# Patient Record
Sex: FEMALE | Race: WHITE | NOT HISPANIC OR LATINO | Employment: OTHER | URBAN - METROPOLITAN AREA
[De-identification: names, ages, dates, MRNs, and addresses within clinical notes are randomized per-mention and may not be internally consistent; named-entity substitution may affect disease eponyms.]

---

## 2017-02-06 ENCOUNTER — OFFICE VISIT (OUTPATIENT)
Dept: INTERNAL MEDICINE | Facility: CLINIC | Age: 67
End: 2017-02-06

## 2017-02-06 VITALS
BODY MASS INDEX: 25.1 KG/M2 | WEIGHT: 147 LBS | DIASTOLIC BLOOD PRESSURE: 60 MMHG | SYSTOLIC BLOOD PRESSURE: 110 MMHG | HEIGHT: 64 IN

## 2017-02-06 DIAGNOSIS — Z12.31 VISIT FOR SCREENING MAMMOGRAM: ICD-10-CM

## 2017-02-06 DIAGNOSIS — Z00.00 HEALTH CARE MAINTENANCE: Primary | ICD-10-CM

## 2017-02-06 DIAGNOSIS — Z23 NEED FOR PNEUMOCOCCAL VACCINATION: ICD-10-CM

## 2017-02-06 DIAGNOSIS — F51.01 PRIMARY INSOMNIA: ICD-10-CM

## 2017-02-06 DIAGNOSIS — M72.2 PLANTAR FASCIITIS: ICD-10-CM

## 2017-02-06 DIAGNOSIS — I25.810 CORONARY ARTERY DISEASE INVOLVING AUTOLOGOUS ARTERY CORONARY BYPASS GRAFT WITHOUT ANGINA PECTORIS: ICD-10-CM

## 2017-02-06 DIAGNOSIS — R73.01 IMPAIRED FASTING GLUCOSE: ICD-10-CM

## 2017-02-06 DIAGNOSIS — R91.1 LUNG NODULE SEEN ON IMAGING STUDY: ICD-10-CM

## 2017-02-06 DIAGNOSIS — I10 ESSENTIAL HYPERTENSION: ICD-10-CM

## 2017-02-06 DIAGNOSIS — E78.5 HYPERLIPIDEMIA, UNSPECIFIED HYPERLIPIDEMIA TYPE: ICD-10-CM

## 2017-02-06 DIAGNOSIS — I10 BENIGN ESSENTIAL HYPERTENSION: ICD-10-CM

## 2017-02-06 PROCEDURE — G0009 ADMIN PNEUMOCOCCAL VACCINE: HCPCS | Performed by: INTERNAL MEDICINE

## 2017-02-06 PROCEDURE — G0402 INITIAL PREVENTIVE EXAM: HCPCS | Performed by: INTERNAL MEDICINE

## 2017-02-06 PROCEDURE — 99214 OFFICE O/P EST MOD 30 MIN: CPT | Performed by: INTERNAL MEDICINE

## 2017-02-06 PROCEDURE — 90670 PCV13 VACCINE IM: CPT | Performed by: INTERNAL MEDICINE

## 2017-02-06 PROCEDURE — G0101 CA SCREEN;PELVIC/BREAST EXAM: HCPCS | Performed by: INTERNAL MEDICINE

## 2017-02-06 RX ORDER — TRAZODONE HYDROCHLORIDE 100 MG/1
100 TABLET ORAL NIGHTLY
Qty: 90 TABLET | Refills: 3 | Status: SHIPPED | OUTPATIENT
Start: 2017-02-06 | End: 2017-08-07 | Stop reason: SDUPTHER

## 2017-02-06 RX ORDER — LISINOPRIL 2.5 MG/1
2.5 TABLET ORAL DAILY
Qty: 90 TABLET | Refills: 3 | Status: SHIPPED | OUTPATIENT
Start: 2017-02-06 | End: 2018-02-09 | Stop reason: SDUPTHER

## 2017-02-06 RX ORDER — SACCHAROMYCES BOULARDII 250 MG
250 CAPSULE ORAL 2 TIMES DAILY
COMMUNITY
End: 2017-04-25

## 2017-02-06 RX ORDER — RANOLAZINE 500 MG/1
500 TABLET, EXTENDED RELEASE ORAL 2 TIMES DAILY
COMMUNITY
End: 2018-02-19

## 2017-02-06 RX ORDER — ISOSORBIDE MONONITRATE 30 MG/1
30 TABLET, EXTENDED RELEASE ORAL DAILY
COMMUNITY
End: 2017-08-07 | Stop reason: SDUPTHER

## 2017-02-06 NOTE — PATIENT INSTRUCTIONS
"Annual wellness visit with preventive exam as well as age and risk appropriate councelling completed.    Cardiovascular disease councelling: current.  Diabetes screening and councelling: current.  Breast cancer screening: is due. Will schedule..  Osteoporosis screening: up to date. Recommended frequency every 2 years. Next DEXA is due in 2018..  Glaucoma screening: up to date.   AAA screening: not needed..  Hep C screening (born between [birth year]73) completed..  Colorectal cancer screening: up to date. Recommended every 5 years. Next screening is recommended in 2020..    Immunizations:   1. Influenza vaccine discussed and recommended, but patient declines.   2. Pneumococcal vaccines: Prevnar 13 will be given today, pneumovaccine 23 will be administered in a year.   3. Zostavax: completed.      Advanced directives planning: has Living Will. Discussed. I agree with patient wishes and decision..    Medications reviewed and medication list updated.   Potential harmful drug-disease interactions in the elderly:statin and impaired fasting blood sugar.  High risk medication in elderly: none  ASA use: continue daily ASA 81 mg.    Hyperlipidemia - well controlled with current  medication and low fat diet. Target LDL is  < 70 mg/dl (\"very high\" risk for CHD). Patient's 72. Normal liver function tests. Continue same medication and diet.Regular exercise recommended.  Impaired fasting blood sugar - normal fasting blood sugar on the recent tests, but her Hb A1C is elevated at 6.2. I had explained to the patient this this means that her risk of diabetes is increased and she has to be on low carb, low starches and sweets diet. Cotninue regular exercise, try to increase exercise to 6 days a week.   Reported lung nodule on the PET scan - will request report and decide if repeat is needed. No lung cancer risk factors, no h/o tobacco.  Plantar fasciitis - given exercise description, needs comfortable footwear. Use ice. If not better - " will refer for dry needling.

## 2017-02-06 NOTE — PROGRESS NOTES
"Onelia Lovell is a 66 y.o. female. Here for Welcome to Medicare visit.    History of Present Illness   Visit Vitals   • /60   • Ht 64\" (162.6 cm)   • Wt 147 lb (66.7 kg)   • BMI 25.23 kg/m2     Patient is being seen for subsequent wellness visit.  Hospitalizations in a past: 3  Once per lifetime Medicare screening tests: ECG - under the care of cardiologist    AAA risk assessment: 1. FH: none. 2.H/o tobacco smoking: none. 3. CV or PAD: as per medical problems list.    Osteoporosis risk assessment:   1. Personal h/o fracture: none  2. FH of fracture/osteoporosis : osteoporosis in mother, h/o hip fracture in maternal aunt   3. Tobacco smoking : none.   4. Alcohol use: none  5.  Secondary osteoporosis: none   6.  Age above 50   7. Hormonal deficiency: yes.    HIV risk assessment: 1. Risky sexual practices: none, 2. H/o blood transfusion: none.    Tobacco use: none   Alcohol use: none  Illicit drugs use: none  Diet: healthy heart  Exercise: 3-4 days a week, walking and resistance training    Anxiety screening: How many days in the last 2 weeks you were  1. Feeling anxious, nervous, on edge: none  2. Unable to stop worrying: none  3. Worrying too much about different things: none  4. Having problems relaxing:none  5. Feeling restless or unable to sit still:none  6. Feeling irritable or easely annoyed: none  7. Being afraid that something awful might happen: none    Depression screening PHQ9:  Over the past 2 weeks how often have you been bothered by  1. Lack of interest or pleasuer in usual activities: none  2. Feeling down, depressed, hopeless:none  3. Troubles falling asleep/sleeping too long:none  4. Feeling tired, having little energy: none  5. Poor appetite or overeating: none  6. Feeling bad about yourself:none.  7. Trouble concentrating: at the baseline.  8. Moving or speaking too slow or much faster than usual: none  9. Thoughts about harming yourself:none.    Cognitive impairment " "screening:   Do you have difficulties with:  1. Language: no  2. Behavior: no  3. Learning/retaining new information: no  4. Handling complex tasks: no  5. Reasoning: no  6. Spacial ability and orientation: no    Please refer to attached Fall Risk Assessement , Mini Cog, clock and Urinary incont  screening forms.      Hearing: normal.  Driving: unrestricted  ADL: independent  ADL details: Does patient needs help with:  telephone use: no  Transportation: no  Housework: no  Shopping: no  meal preparation: no  laundry: no  managing medication:no  Participate in the social activities: Y    Fall risk factors:   1.Use of alcohol: no    2.Polypharmacy: yes  3.H/o of previous fall:  no  4. Mobility impairment:  no  5. Visual impairment:  no  6. Deconditioning: no  7. Use of antihypertensive medication:  yes  8. Use of sedatives: no  9. Use of antidepressant: yes    Home safety:   1.loose rugs: no   2.unfamiliar environment: no   3. Uneven floors: no   4. No grab bars in the bathroom:  no  5. No banister on stairs: no      Advanced directives: has Living Will. Discussed. I agree with patient wishes and decision..    Co-managers: ophthalmology  [facility], cardiologist Dr.janet Ocampo, dermatologist , GI , also has allergist [facility] but doesn't remember the name of MD.      Visit Vitals   • /60   • Ht 64\" (162.6 cm)   • Wt 147 lb (66.7 kg)   • BMI 25.23 kg/m2       Current Outpatient Prescriptions:   •  aspirin 81 MG EC tablet, Take  by mouth., Disp: , Rfl:   •  Calcium 250 MG capsule, Take  by mouth., Disp: , Rfl:   •  Cetirizine HCl 10 MG capsule, Take  by mouth., Disp: , Rfl:   •  Cholecalciferol (VITAMIN D3) 5000 UNITS capsule capsule, Take 1,000 Units by mouth daily., Disp: , Rfl:   •  Cranberry (HM CRANBERRY SUPER STRENGTH) 300 MG tablet, Take 300 mg by mouth Daily., Disp: , Rfl:   •  CRESTOR 10 MG tablet, TAKE 1 TABLET DAILY, Disp: 90 tablet, Rfl: 3  •  fluticasone " "(FLONASE) 50 MCG/ACT nasal spray, into each nostril., Disp: , Rfl:   •  isosorbide mononitrate (IMDUR) 30 MG 24 hr tablet, Take 30 mg by mouth Daily., Disp: , Rfl:   •  lisinopril (PRINIVIL,ZESTRIL) 2.5 MG tablet, Take 1 tablet by mouth Daily., Disp: 90 tablet, Rfl: 3  •  Magnesium Oxide 400 MG capsule, Take  by mouth., Disp: , Rfl:   •  metoprolol tartrate (LOPRESSOR) 25 MG tablet, Take 1 tablet by mouth Daily., Disp: 90 tablet, Rfl: 3  •  Multiple Vitamin (MULTI VITAMIN DAILY PO), Take  by mouth., Disp: , Rfl:   •  Omega-3 Fatty Acids (FISH OIL) 1200 MG capsule capsule, Take  by mouth., Disp: , Rfl:   •  ranitidine (ZANTAC) 150 MG capsule, Take 1 capsule by mouth 2 (two) times a day., Disp: 60 capsule, Rfl: 11  •  ranolazine (RANEXA) 500 MG 12 hr tablet, Take 500 mg by mouth 2 (Two) Times a Day., Disp: , Rfl:   •  saccharomyces boulardii (FLORASTOR) 250 MG capsule, Take 250 mg by mouth 2 (Two) Times a Day., Disp: , Rfl:   •  traZODone (DESYREL) 100 MG tablet, Take 1 tablet by mouth every night., Disp: 90 tablet, Rfl: 3  •  valACYclovir (VALTREX) 1000 MG tablet, Take  by mouth every 12 (twelve) hours., Disp: , Rfl:     Patient has been diagnosed with hyperlipidemia. Target LDL is < 70 mg/dl (\"very high\" risk for CHD). Patient is on low fat diet with good compliance. Patient is compliant with treatment: daily use of Crestor (rozuvastatin). Side effects of medication: none. Exercise 3-4 days a week, walking and aerobic exercise.    Patient had been noticed to have elevated fasting blood sugars. No FH of DM.  Compliance with low carb diabetic diet is poor. Regular exercise: walks and does resistance training 3-4 days a week.    Patient reports that she had PET scan ordered by her cardiologist Dr.janet Ocampo 6 weeks ago or so. Report is not available ti me at this time. Patient has some SOA while climbs stairs, no orthopnea, no cough or hemoptysis. There is no h/o tobacco smoking. Ne fever/chills.    Patient walks " regularly, but about a year ago the walking had been difficult due to the pain in the lateral right hill. No treatment so afar. No h/o trauma.                                  The following portions of the patient's history were reviewed and updated as appropriate: allergies, current medications, past family history, past medical history, past social history, past surgical history and problem list.    Review of Systems   Constitutional: Negative for chills and fever.   HENT: Positive for postnasal drip. Negative for sinus pressure and sore throat.    Eyes: Negative for pain and itching.   Respiratory: Negative for cough and chest tightness.    Cardiovascular: Negative for chest pain and leg swelling.   Gastrointestinal: Negative for abdominal pain and blood in stool.   Endocrine: Negative for cold intolerance and heat intolerance.   Genitourinary: Negative for difficulty urinating and flank pain.   Musculoskeletal: Negative for back pain and neck pain.   Skin: Negative for color change and rash.   Neurological: Negative for dizziness, weakness and headaches.   Hematological: Negative for adenopathy. Does not bruise/bleed easily.   Psychiatric/Behavioral: Negative for sleep disturbance. The patient is not nervous/anxious.        Objective   Physical Exam   Constitutional: She is oriented to person, place, and time. She appears well-developed. No distress.   HENT:   Head: Normocephalic and atraumatic.   Right Ear: Tympanic membrane, external ear and ear canal normal. No tenderness. No mastoid tenderness. Tympanic membrane is not injected. No middle ear effusion.   Left Ear: Tympanic membrane, external ear and ear canal normal. No tenderness. No mastoid tenderness. Tympanic membrane is not injected.  No middle ear effusion.   Nose: Nose normal. Right sinus exhibits no maxillary sinus tenderness and no frontal sinus tenderness. Left sinus exhibits no maxillary sinus tenderness and no frontal sinus tenderness.    Mouth/Throat: Uvula is midline, oropharynx is clear and moist and mucous membranes are normal. No oropharyngeal exudate or posterior oropharyngeal edema.   Eyes: Conjunctivae, EOM and lids are normal. Pupils are equal, round, and reactive to light. Right eye exhibits no discharge. Left eye exhibits no discharge. Right conjunctiva is not injected. Right conjunctiva has no hemorrhage. Left conjunctiva is not injected. Left conjunctiva has no hemorrhage. No scleral icterus. Right eye exhibits normal extraocular motion and no nystagmus. Left eye exhibits normal extraocular motion and no nystagmus.   Neck: Normal range of motion and full passive range of motion without pain. Neck supple. No JVD present. Carotid bruit is not present. No thyromegaly present.   Cardiovascular: Normal rate, regular rhythm, S1 normal, S2 normal, normal heart sounds and intact distal pulses.  Exam reveals no gallop and no friction rub.    No murmur heard.  Pulmonary/Chest: Effort normal and breath sounds normal. No accessory muscle usage. No respiratory distress. She has no decreased breath sounds. She has no wheezes. She has no rhonchi. She has no rales. Right breast exhibits no inverted nipple, no mass, no nipple discharge, no skin change and no tenderness. Left breast exhibits no inverted nipple, no mass, no nipple discharge, no skin change and no tenderness. Breasts are symmetrical. There is no breast swelling.   S/p bilat implants, well healed scars   Abdominal: Soft. Bowel sounds are normal. She exhibits no distension, no abdominal bruit and no mass. There is no tenderness. There is no rebound and no guarding. Hernia confirmed negative in the right inguinal area and confirmed negative in the left inguinal area.   Genitourinary: Rectum normal, vagina normal and uterus normal. Rectal exam shows no fissure, no mass and anal tone normal. No breast tenderness, discharge or bleeding. Pelvic exam was performed with patient supine. No labial  fusion. There is no rash, tenderness, lesion or injury on the right labia. There is no rash, tenderness, lesion or injury on the left labia. Uterus is not deviated, not enlarged, not fixed and not tender. Cervix exhibits no motion tenderness, no discharge and no friability. Right adnexum displays no mass, no tenderness and no fullness. Left adnexum displays no mass, no tenderness and no fullness. No bleeding in the vagina. No vaginal discharge found.   Genitourinary Comments: PAP taken   Musculoskeletal: She exhibits tenderness (on palpation bilaterally right hill). She exhibits no edema.   Lymphadenopathy:        Head (right side): No submental, no submandibular, no preauricular, no posterior auricular and no occipital adenopathy present.        Head (left side): No submental, no submandibular, no preauricular, no posterior auricular and no occipital adenopathy present.     She has no cervical adenopathy.        Right cervical: No superficial cervical, no deep cervical and no posterior cervical adenopathy present.       Left cervical: No superficial cervical, no deep cervical and no posterior cervical adenopathy present.     She has no axillary adenopathy.        Right: No inguinal and no supraclavicular adenopathy present.        Left: No inguinal and no supraclavicular adenopathy present.   Neurological: She is alert and oriented to person, place, and time. She has normal reflexes. She displays normal reflexes. No cranial nerve deficit or sensory deficit. She exhibits normal muscle tone. Coordination normal.   Reflex Scores:       Bicep reflexes are 2+ on the right side and 2+ on the left side.       Patellar reflexes are 2+ on the right side and 2+ on the left side.  Skin: Skin is warm. She is not diaphoretic. No cyanosis. Nails show no clubbing.   Psychiatric: She has a normal mood and affect. Her behavior is normal.   Vitals reviewed.      Assessment/Plan   Diagnoses and all orders for this visit:    Health  care maintenance  -     Pap IG, Rfx HPV ASCU; Future    Hyperlipidemia, unspecified hyperlipidemia type  -     Comprehensive Metabolic Panel; Future  -     Lipid Panel; Future    Impaired fasting glucose  -     Hemoglobin A1c; Future    Visit for screening mammogram  -     Mammo Screening Bilateral With CAD    Lung nodule seen on imaging study    Need for pneumococcal vaccination  -     Pneumococcal Conjugate Vaccine 13-Valent All    Coronary artery disease involving autologous artery coronary bypass graft without angina pectoris  -     metoprolol tartrate (LOPRESSOR) 25 MG tablet; Take 1 tablet by mouth Daily.    Primary insomnia  -     traZODone (DESYREL) 100 MG tablet; Take 1 tablet by mouth Every Night.    Benign essential hypertension  -     lisinopril (PRINIVIL,ZESTRIL) 2.5 MG tablet; Take 1 tablet by mouth Daily.    Essential hypertension    Plantar fasciitis    Annual wellness visit with preventive exam as well as age and risk appropriate councelling completed.    Cardiovascular disease councelling: current.  Diabetes screening and councelling: current.  Breast cancer screening: is due. Will schedule..  Osteoporosis screening: up to date. Recommended frequency every 2 years. Next DEXA is due in 2018..  Glaucoma screening: up to date.   AAA screening: not needed..  Hep C screening (born between [birth year]) completed..  Colorectal cancer screening: up to date. Recommended every 5 years. Next screening is recommended in 2020..    Immunizations:   1. Influenza vaccine discussed and recommended, but patient declines.   2. Pneumococcal vaccines: Prevnar 13 will be given today, pneumovaccine 23 will be administered in a year.   3. Zostavax: completed.      Advanced directives planning: has Living Will. Discussed. I agree with patient wishes and decision..    Medications reviewed and medication list updated.   Potential harmful drug-disease interactions in the elderly:statin and impaired fasting blood sugar.  High  "risk medication in elderly: none  ASA use: continue daily ASA 81 mg.    Hyperlipidemia - well controlled with current  medication and low fat diet. Target LDL is  < 70 mg/dl (\"very high\" risk for CHD). Patient's 72. Normal liver function tests. Continue same medication and diet.Regular exercise recommended.  Impaired fasting blood sugar - normal fasting blood sugar on the recent tests, but her Hb A1C is elevated at 6.2. I had explained to the patient this this means that her risk of diabetes is increased and she has to be on low carb, low starches and sweets diet. Cotninue regular exercise, try to increase exercise to 6 days a week.   Reported lung nodule on the PET scan - will request report and decide if repeat is needed. No lung cancer risk factors, no h/o tobacco.  Plantar fasciitis - given exercise description, needs comfortable footwear. Use ice. If not better - will refer for dry needling.       "

## 2017-02-08 LAB
CHROM ANALY OVERALL INTERP-IMP: NORMAL
CONV .: NORMAL
CONV .: NORMAL
CONV PERFORMED BY:: NORMAL
DX ICD CODE: NORMAL
HIV 1 & 2 AB SER-IMP: NORMAL
REF LAB TEST METHOD: NORMAL
STAT OF ADQ CVX/VAG CYTO-IMP: NORMAL

## 2017-02-14 ENCOUNTER — APPOINTMENT (OUTPATIENT)
Dept: WOMENS IMAGING | Facility: HOSPITAL | Age: 67
End: 2017-02-14

## 2017-02-14 PROCEDURE — 77063 BREAST TOMOSYNTHESIS BI: CPT | Performed by: RADIOLOGY

## 2017-02-14 PROCEDURE — G0202 SCR MAMMO BI INCL CAD: HCPCS | Performed by: RADIOLOGY

## 2017-02-17 DIAGNOSIS — I25.810 CORONARY ARTERY DISEASE INVOLVING AUTOLOGOUS ARTERY CORONARY BYPASS GRAFT WITHOUT ANGINA PECTORIS: ICD-10-CM

## 2017-04-25 ENCOUNTER — OFFICE VISIT (OUTPATIENT)
Dept: INTERNAL MEDICINE | Facility: CLINIC | Age: 67
End: 2017-04-25

## 2017-04-25 VITALS
SYSTOLIC BLOOD PRESSURE: 110 MMHG | DIASTOLIC BLOOD PRESSURE: 72 MMHG | OXYGEN SATURATION: 96 % | HEIGHT: 64 IN | BODY MASS INDEX: 25.1 KG/M2 | WEIGHT: 147 LBS | HEART RATE: 61 BPM

## 2017-04-25 DIAGNOSIS — H10.89 OTHER CONJUNCTIVITIS OF LEFT EYE: Primary | ICD-10-CM

## 2017-04-25 DIAGNOSIS — R60.0 FACIAL EDEMA: ICD-10-CM

## 2017-04-25 DIAGNOSIS — H10.89 OTHER CONJUNCTIVITIS OF LEFT EYE: ICD-10-CM

## 2017-04-25 PROCEDURE — 99213 OFFICE O/P EST LOW 20 MIN: CPT | Performed by: INTERNAL MEDICINE

## 2017-04-25 RX ORDER — TOBRAMYCIN AND DEXAMETHASONE 3; 1 MG/ML; MG/ML
1 SUSPENSION/ DROPS OPHTHALMIC
Qty: 10 ML | Refills: 0 | Status: SHIPPED | OUTPATIENT
Start: 2017-04-25 | End: 2017-08-07

## 2017-04-25 RX ORDER — METHYLPREDNISOLONE 4 MG/1
TABLET ORAL
Qty: 21 TABLET | Refills: 0 | Status: SHIPPED | OUTPATIENT
Start: 2017-04-25 | End: 2017-08-07

## 2017-04-25 NOTE — PROGRESS NOTES
Subjective   Colleen Lovell is a 66 y.o. female.     Facial Swelling   This is a new problem. The current episode started in the past 7 days.        The following portions of the patient's history were reviewed and updated as appropriate: allergies, current medications, past family history, past medical history, past social history, past surgical history and problem list.    Review of Systems   Constitutional:        Has new problems   HENT: Positive for facial swelling (L side of face has been swelling past 2 days Doing about the samne Gets better as day goes on but swell up more @ night  No pain or itching) and rhinorrhea (Usual allergies).    Eyes: Positive for redness (Began W / eye infection last Thursday  & used lft over drops Hasn't helped ).   Respiratory: Negative.    Cardiovascular: Negative.        Objective   Physical Exam   HENT:   Head: Normocephalic.   Right Ear: External ear normal.   Left Ear: External ear normal.   Mouth/Throat: Oropharynx is clear and moist.   Has mild L facial swelling& erythema Nontender   Eyes: EOM are normal. Pupils are equal, round, and reactive to light.   L conjunctiva mildly congested   Cardiovascular: Normal rate, regular rhythm and normal heart sounds.    Pulmonary/Chest: Effort normal and breath sounds normal.   Musculoskeletal: Normal range of motion.   Neurological: She is alert.   Vitals reviewed.      Assessment/Plan   Colleen was seen today for facial swelling.    Diagnoses and all orders for this visit:    Other conjunctivitis of left eye  -     tobramycin-dexamethasone (TOBRADEX) 0.3-0.1 % ophthalmic suspension; Administer 1 drop into the left eye Every 4 (Four) Hours While Awake.    Facial edema  -     MethylPREDNISolone (MEDROL) 4 MG tablet; follow package directions

## 2017-05-02 ENCOUNTER — PATIENT MESSAGE (OUTPATIENT)
Dept: INTERNAL MEDICINE | Facility: CLINIC | Age: 67
End: 2017-05-02

## 2017-07-31 ENCOUNTER — LAB (OUTPATIENT)
Dept: INTERNAL MEDICINE | Facility: CLINIC | Age: 67
End: 2017-07-31

## 2017-07-31 DIAGNOSIS — R73.01 IMPAIRED FASTING GLUCOSE: ICD-10-CM

## 2017-07-31 DIAGNOSIS — E78.5 HYPERLIPIDEMIA, UNSPECIFIED HYPERLIPIDEMIA TYPE: ICD-10-CM

## 2017-07-31 LAB
ALBUMIN SERPL-MCNC: 4.07 G/DL (ref 3.4–4.6)
ALBUMIN/GLOB SERPL: 1.3 G/DL
ALP SERPL-CCNC: 93 U/L (ref 46–116)
ALT SERPL W P-5'-P-CCNC: 28 U/L (ref 14–59)
ANION GAP SERPL CALCULATED.3IONS-SCNC: 9 MMOL/L
AST SERPL-CCNC: 24 U/L (ref 7–37)
BILIRUB SERPL-MCNC: 1.1 MG/DL (ref 0.2–1)
BUN BLD-MCNC: 15 MG/DL (ref 6–22)
BUN/CREAT SERPL: 18.5 (ref 7–25)
CALCIUM SPEC-SCNC: 9.4 MG/DL (ref 8.6–10.5)
CHLORIDE SERPL-SCNC: 106 MMOL/L (ref 95–107)
CHOLEST SERPL-MCNC: 170 MG/DL (ref 0–200)
CO2 SERPL-SCNC: 28 MMOL/L (ref 23–32)
CREAT BLD-MCNC: 0.81 MG/DL (ref 0.55–1.02)
GFR SERPL CREATININE-BSD FRML MDRD: 71 ML/MIN/1.73
GLOBULIN UR ELPH-MCNC: 3.2 GM/DL
GLUCOSE BLD-MCNC: 100 MG/DL (ref 70–100)
HBA1C MFR BLD: 5.53 % (ref 4.8–5.6)
HDLC SERPL-MCNC: 57 MG/DL (ref 40–81)
LDLC SERPL CALC-MCNC: 91 MG/DL (ref 0–100)
LDLC/HDLC SERPL: 1.59 {RATIO}
POTASSIUM BLD-SCNC: 5.2 MMOL/L (ref 3.3–5.3)
PROT SERPL-MCNC: 7.3 G/DL (ref 6.3–8.4)
SODIUM BLD-SCNC: 143 MMOL/L (ref 136–145)
TRIGL SERPL-MCNC: 112 MG/DL (ref 0–150)
VLDLC SERPL-MCNC: 22.4 MG/DL

## 2017-07-31 PROCEDURE — 80061 LIPID PANEL: CPT | Performed by: INTERNAL MEDICINE

## 2017-07-31 PROCEDURE — 80053 COMPREHEN METABOLIC PANEL: CPT | Performed by: INTERNAL MEDICINE

## 2017-08-07 ENCOUNTER — OFFICE VISIT (OUTPATIENT)
Dept: INTERNAL MEDICINE | Facility: CLINIC | Age: 67
End: 2017-08-07

## 2017-08-07 VITALS
WEIGHT: 147 LBS | DIASTOLIC BLOOD PRESSURE: 62 MMHG | RESPIRATION RATE: 14 BRPM | SYSTOLIC BLOOD PRESSURE: 104 MMHG | BODY MASS INDEX: 25.1 KG/M2 | HEIGHT: 64 IN

## 2017-08-07 DIAGNOSIS — F51.01 PRIMARY INSOMNIA: ICD-10-CM

## 2017-08-07 DIAGNOSIS — Z00.00 HEALTH CARE MAINTENANCE: ICD-10-CM

## 2017-08-07 DIAGNOSIS — I25.810 CORONARY ARTERY DISEASE INVOLVING AUTOLOGOUS ARTERY CORONARY BYPASS GRAFT WITHOUT ANGINA PECTORIS: ICD-10-CM

## 2017-08-07 DIAGNOSIS — E78.00 PURE HYPERCHOLESTEROLEMIA: Primary | ICD-10-CM

## 2017-08-07 DIAGNOSIS — R73.01 IMPAIRED FASTING GLUCOSE: ICD-10-CM

## 2017-08-07 PROBLEM — I51.89 DIASTOLIC DYSFUNCTION: Status: ACTIVE | Noted: 2017-08-07

## 2017-08-07 PROCEDURE — 99213 OFFICE O/P EST LOW 20 MIN: CPT | Performed by: INTERNAL MEDICINE

## 2017-08-07 RX ORDER — TRAZODONE HYDROCHLORIDE 100 MG/1
100 TABLET ORAL NIGHTLY
Qty: 90 TABLET | Refills: 3 | Status: SHIPPED | OUTPATIENT
Start: 2017-08-07 | End: 2018-02-09 | Stop reason: SDUPTHER

## 2017-08-07 RX ORDER — ROSUVASTATIN CALCIUM 10 MG/1
10 TABLET, COATED ORAL DAILY
Qty: 90 TABLET | Refills: 3 | Status: SHIPPED | OUTPATIENT
Start: 2017-08-07 | End: 2018-02-09 | Stop reason: SDUPTHER

## 2017-08-07 RX ORDER — ISOSORBIDE MONONITRATE 30 MG/1
30 TABLET, EXTENDED RELEASE ORAL DAILY
Qty: 90 TABLET | Refills: 3 | Status: SHIPPED | OUTPATIENT
Start: 2017-08-07 | End: 2018-02-19 | Stop reason: SDUPTHER

## 2017-08-07 NOTE — PROGRESS NOTES
"Onelia Lovell is a 66 y.o. female. Here for hyperlipidemaia and impaired fasting blood sugar f/u    History of Present Illness     /62 (BP Location: Left arm, Patient Position: Sitting, Cuff Size: Adult)  Resp 14  Ht 64\" (162.6 cm)  Wt 147 lb (66.7 kg)  BMI 25.23 kg/m2    Current Outpatient Prescriptions:   •  aspirin 81 MG EC tablet, Take  by mouth., Disp: , Rfl:   •  Calcium 250 MG capsule, Take  by mouth., Disp: , Rfl:   •  Cetirizine HCl 10 MG capsule, Take  by mouth., Disp: , Rfl:   •  Cholecalciferol (VITAMIN D3) 5000 UNITS capsule capsule, Take 1,000 Units by mouth daily., Disp: , Rfl:   •  Cranberry (HM CRANBERRY SUPER STRENGTH) 300 MG tablet, Take 300 mg by mouth Daily., Disp: , Rfl:   •  CRESTOR 10 MG tablet, TAKE 1 TABLET DAILY, Disp: 90 tablet, Rfl: 3  •  isosorbide mononitrate (IMDUR) 30 MG 24 hr tablet, Take 30 mg by mouth Daily., Disp: , Rfl:   •  lisinopril (PRINIVIL,ZESTRIL) 2.5 MG tablet, Take 1 tablet by mouth Daily., Disp: 90 tablet, Rfl: 3  •  Magnesium Oxide 400 MG capsule, Take  by mouth., Disp: , Rfl:   •  MethylPREDNISolone (MEDROL) 4 MG tablet, follow package directions, Disp: 21 tablet, Rfl: 0  •  metoprolol tartrate (LOPRESSOR) 25 MG tablet, Take 1 tablet by mouth 2 (Two) Times a Day. Patient's dose has been increased, Disp: 180 tablet, Rfl: 3  •  Multiple Vitamin (MULTI VITAMIN DAILY PO), Take  by mouth., Disp: , Rfl:   •  Omega-3 Fatty Acids (FISH OIL) 1200 MG capsule capsule, Take  by mouth., Disp: , Rfl:   •  ranitidine (ZANTAC) 150 MG capsule, Take 1 capsule by mouth 2 (two) times a day., Disp: 60 capsule, Rfl: 11  •  ranolazine (RANEXA) 500 MG 12 hr tablet, Take 500 mg by mouth 2 (Two) Times a Day., Disp: , Rfl:   •  tobramycin-dexamethasone (TOBRADEX) 0.3-0.1 % ophthalmic suspension, Administer 1 drop into the left eye Every 4 (Four) Hours While Awake., Disp: 10 mL, Rfl: 0  •  traZODone (DESYREL) 100 MG tablet, Take 1 tablet by mouth Every Night., Disp: " "90 tablet, Rfl: 3  •  valACYclovir (VALTREX) 1000 MG tablet, Take  by mouth every 12 (twelve) hours., Disp: , Rfl:     Patient has been diagnosed with hyperlipidemia. Target LDL is < 70 mg/dl (\"very high\" risk for CHD). Patient is on low fat diet with good compliance. Patient is compliant with treatment: daily use of Crestor (rozuvastatin). Side effects of medication: none. Exercise 3-4 days a week and walking.    Patient had been noticed to have elevated fasting blood sugars and Hb A1C at 6.0 and 6.2. Compliance with low carb diabetic diet is good.     The following portions of the patient's history were reviewed and updated as appropriate: allergies, current medications, past family history, past medical history, past social history, past surgical history and problem list.    Review of Systems   Constitutional: Negative for chills and fever.   Eyes: Negative for pain and redness.   Respiratory: Negative for cough and shortness of breath.    Cardiovascular: Negative for chest pain and leg swelling.   Neurological: Negative for dizziness and headaches.       Objective   Physical Exam   Constitutional: She is oriented to person, place, and time. She appears well-developed and well-nourished.   HENT:   Head: Normocephalic and atraumatic.   Right Ear: Tympanic membrane, external ear and ear canal normal.   Left Ear: Tympanic membrane, external ear and ear canal normal.   Nose: Nose normal. Right sinus exhibits no maxillary sinus tenderness and no frontal sinus tenderness. Left sinus exhibits no maxillary sinus tenderness and no frontal sinus tenderness.   Mouth/Throat: Uvula is midline, oropharynx is clear and moist and mucous membranes are normal.   Eyes: Conjunctivae and EOM are normal. Pupils are equal, round, and reactive to light. Right eye exhibits no discharge. Left eye exhibits no discharge. No scleral icterus.   Neck: Neck supple. No JVD present.   Cardiovascular: Normal rate, regular rhythm and normal heart " "sounds.  Exam reveals no gallop and no friction rub.    No murmur heard.  Pulmonary/Chest: Effort normal and breath sounds normal. She has no wheezes. She has no rales.   Musculoskeletal: She exhibits no edema.   Lymphadenopathy:     She has no cervical adenopathy.   Neurological: She is alert and oriented to person, place, and time. No cranial nerve deficit.   Skin: Skin is warm and dry. No rash noted.   Psychiatric: She has a normal mood and affect. Her behavior is normal.   Vitals reviewed.      Assessment/Plan   Colleen was seen today for hyperlipidemia and blood sugar.    Diagnoses and all orders for this visit:    Pure hypercholesterolemia    Impaired fasting glucose      Hyperlipidemia - well controlled with statin. Normal liver function tests. LDL target is below < 70 mg/dl (\"very high\" risk for CHD). Patient's up to 91 - outdated Crestor?. Continue same treatment. Regular exercise recommended.  Impaired fasting blood sugar - resolved with increased exercise and better diet. Hb A1C is normal at 5.5. Low carb diet and regular exercise recommended. Weight loss will be very beneficial.      "

## 2018-02-02 ENCOUNTER — LAB (OUTPATIENT)
Dept: INTERNAL MEDICINE | Facility: CLINIC | Age: 68
End: 2018-02-02

## 2018-02-02 DIAGNOSIS — E78.00 PURE HYPERCHOLESTEROLEMIA: Primary | ICD-10-CM

## 2018-02-02 DIAGNOSIS — R73.01 IMPAIRED FASTING GLUCOSE: ICD-10-CM

## 2018-02-02 LAB
ALBUMIN SERPL-MCNC: 4.1 G/DL (ref 3.5–5.2)
ALBUMIN/GLOB SERPL: 1.2 G/DL
ALP SERPL-CCNC: 89 U/L (ref 39–117)
ALT SERPL W P-5'-P-CCNC: 19 U/L (ref 1–33)
ANION GAP SERPL CALCULATED.3IONS-SCNC: 10.6 MMOL/L
AST SERPL-CCNC: 20 U/L (ref 1–32)
BASOPHILS # BLD AUTO: 0.05 10*3/MM3 (ref 0–0.2)
BASOPHILS NFR BLD AUTO: 0.8 % (ref 0–2)
BILIRUB SERPL-MCNC: 0.8 MG/DL (ref 0.1–1.2)
BUN BLD-MCNC: 10 MG/DL (ref 8–23)
BUN/CREAT SERPL: 13 (ref 7–25)
CALCIUM SPEC-SCNC: 9.4 MG/DL (ref 8.6–10.5)
CHLORIDE SERPL-SCNC: 106 MMOL/L (ref 98–107)
CHOLEST SERPL-MCNC: 152 MG/DL (ref 0–200)
CO2 SERPL-SCNC: 25.4 MMOL/L (ref 22–29)
CREAT BLD-MCNC: 0.77 MG/DL (ref 0.57–1)
DEPRECATED RDW RBC AUTO: 41.6 FL (ref 37–54)
EOSINOPHIL # BLD AUTO: 0.25 10*3/MM3 (ref 0–0.7)
EOSINOPHIL NFR BLD AUTO: 3.8 % (ref 0–5)
ERYTHROCYTE [DISTWIDTH] IN BLOOD BY AUTOMATED COUNT: 12.8 % (ref 11.5–15)
GFR SERPL CREATININE-BSD FRML MDRD: 75 ML/MIN/1.73
GLOBULIN UR ELPH-MCNC: 3.4 GM/DL
GLUCOSE BLD-MCNC: 99 MG/DL (ref 65–99)
HBA1C MFR BLD: 5.96 % (ref 4.8–5.6)
HCT VFR BLD AUTO: 40.7 % (ref 34.1–44.9)
HDLC SERPL-MCNC: 58 MG/DL (ref 40–60)
HGB BLD-MCNC: 13.6 G/DL (ref 11.2–15.7)
LDLC SERPL CALC-MCNC: 69 MG/DL (ref 0–100)
LDLC/HDLC SERPL: 1.19 {RATIO}
LYMPHOCYTES # BLD AUTO: 1.89 10*3/MM3 (ref 0.8–7)
LYMPHOCYTES NFR BLD AUTO: 29.1 % (ref 10–60)
MCH RBC QN AUTO: 30.3 PG (ref 26–34)
MCHC RBC AUTO-ENTMCNC: 33.4 G/DL (ref 31–37)
MCV RBC AUTO: 90.6 FL (ref 80–100)
MONOCYTES # BLD AUTO: 0.47 10*3/MM3 (ref 0–1)
MONOCYTES NFR BLD AUTO: 7.2 % (ref 0–13)
NEUTROPHILS # BLD AUTO: 3.84 10*3/MM3 (ref 1–11)
NEUTROPHILS NFR BLD AUTO: 59.1 % (ref 30–85)
PLATELET # BLD AUTO: 259 10*3/MM3 (ref 150–450)
PMV BLD AUTO: 9.7 FL (ref 6–12)
POTASSIUM BLD-SCNC: 4.6 MMOL/L (ref 3.5–5.2)
PROT SERPL-MCNC: 7.5 G/DL (ref 6–8.5)
RBC # BLD AUTO: 4.49 10*6/MM3 (ref 3.93–5.22)
SODIUM BLD-SCNC: 142 MMOL/L (ref 136–145)
TRIGL SERPL-MCNC: 124 MG/DL (ref 0–150)
TSH SERPL DL<=0.05 MIU/L-ACNC: 2.6 MIU/ML (ref 0.27–4.2)
VLDLC SERPL-MCNC: 24.8 MG/DL (ref 5–40)
WBC NRBC COR # BLD: 6.5 10*3/MM3 (ref 5–10)

## 2018-02-02 PROCEDURE — 83036 HEMOGLOBIN GLYCOSYLATED A1C: CPT | Performed by: INTERNAL MEDICINE

## 2018-02-02 PROCEDURE — 36415 COLL VENOUS BLD VENIPUNCTURE: CPT | Performed by: INTERNAL MEDICINE

## 2018-02-02 PROCEDURE — 80053 COMPREHEN METABOLIC PANEL: CPT | Performed by: INTERNAL MEDICINE

## 2018-02-02 PROCEDURE — 84443 ASSAY THYROID STIM HORMONE: CPT | Performed by: INTERNAL MEDICINE

## 2018-02-02 PROCEDURE — 85025 COMPLETE CBC W/AUTO DIFF WBC: CPT | Performed by: INTERNAL MEDICINE

## 2018-02-02 PROCEDURE — 80061 LIPID PANEL: CPT | Performed by: INTERNAL MEDICINE

## 2018-02-09 ENCOUNTER — OFFICE VISIT (OUTPATIENT)
Dept: INTERNAL MEDICINE | Facility: CLINIC | Age: 68
End: 2018-02-09

## 2018-02-09 VITALS
BODY MASS INDEX: 25.44 KG/M2 | DIASTOLIC BLOOD PRESSURE: 72 MMHG | RESPIRATION RATE: 14 BRPM | SYSTOLIC BLOOD PRESSURE: 112 MMHG | WEIGHT: 149 LBS | HEIGHT: 64 IN

## 2018-02-09 DIAGNOSIS — Z12.31 VISIT FOR SCREENING MAMMOGRAM: ICD-10-CM

## 2018-02-09 DIAGNOSIS — Z86.010 HISTORY OF ADENOMATOUS POLYP OF COLON: ICD-10-CM

## 2018-02-09 DIAGNOSIS — R06.09 EXERTIONAL DYSPNEA: ICD-10-CM

## 2018-02-09 DIAGNOSIS — Z23 NEED FOR 23-POLYVALENT PNEUMOCOCCAL POLYSACCHARIDE VACCINE: ICD-10-CM

## 2018-02-09 DIAGNOSIS — Z78.0 POST-MENOPAUSE: ICD-10-CM

## 2018-02-09 DIAGNOSIS — R91.1 LUNG NODULE SEEN ON IMAGING STUDY: ICD-10-CM

## 2018-02-09 DIAGNOSIS — R73.01 IMPAIRED FASTING GLUCOSE: ICD-10-CM

## 2018-02-09 DIAGNOSIS — Z00.00 HEALTH CARE MAINTENANCE: Primary | ICD-10-CM

## 2018-02-09 DIAGNOSIS — I10 BENIGN ESSENTIAL HYPERTENSION: ICD-10-CM

## 2018-02-09 DIAGNOSIS — F51.01 PRIMARY INSOMNIA: ICD-10-CM

## 2018-02-09 DIAGNOSIS — E78.00 PURE HYPERCHOLESTEROLEMIA: ICD-10-CM

## 2018-02-09 DIAGNOSIS — I25.810 CORONARY ARTERY DISEASE INVOLVING AUTOLOGOUS ARTERY CORONARY BYPASS GRAFT WITHOUT ANGINA PECTORIS: ICD-10-CM

## 2018-02-09 PROBLEM — Z86.0101 HISTORY OF ADENOMATOUS POLYP OF COLON: Status: ACTIVE | Noted: 2018-02-09

## 2018-02-09 PROBLEM — D12.6 TUBULAR ADENOMA OF COLON: Status: ACTIVE | Noted: 2018-02-09

## 2018-02-09 PROBLEM — D12.6 TUBULAR ADENOMA OF COLON: Status: RESOLVED | Noted: 2018-02-09 | Resolved: 2018-02-09

## 2018-02-09 PROCEDURE — G0009 ADMIN PNEUMOCOCCAL VACCINE: HCPCS | Performed by: INTERNAL MEDICINE

## 2018-02-09 PROCEDURE — 90732 PPSV23 VACC 2 YRS+ SUBQ/IM: CPT | Performed by: INTERNAL MEDICINE

## 2018-02-09 PROCEDURE — 99214 OFFICE O/P EST MOD 30 MIN: CPT | Performed by: INTERNAL MEDICINE

## 2018-02-09 PROCEDURE — G0439 PPPS, SUBSEQ VISIT: HCPCS | Performed by: INTERNAL MEDICINE

## 2018-02-09 RX ORDER — AZELASTINE 1 MG/ML
SPRAY, METERED NASAL
COMMUNITY
Start: 2018-02-05 | End: 2019-07-20

## 2018-02-09 RX ORDER — LISINOPRIL 2.5 MG/1
2.5 TABLET ORAL DAILY
Qty: 90 TABLET | Refills: 3 | Status: SHIPPED | OUTPATIENT
Start: 2018-02-09 | End: 2019-05-06 | Stop reason: SDUPTHER

## 2018-02-09 RX ORDER — LOTEPREDNOL ETABONATE 5 MG/G
GEL OPHTHALMIC
COMMUNITY
Start: 2018-02-02 | End: 2018-02-19

## 2018-02-09 RX ORDER — TRAZODONE HYDROCHLORIDE 100 MG/1
100 TABLET ORAL NIGHTLY
Qty: 90 TABLET | Refills: 3 | Status: SHIPPED | OUTPATIENT
Start: 2018-02-09 | End: 2019-05-06 | Stop reason: SDUPTHER

## 2018-02-09 RX ORDER — ROSUVASTATIN CALCIUM 10 MG/1
10 TABLET, COATED ORAL DAILY
Qty: 90 TABLET | Refills: 3 | Status: SHIPPED | OUTPATIENT
Start: 2018-02-09 | End: 2019-05-06 | Stop reason: SDUPTHER

## 2018-02-09 NOTE — PROGRESS NOTES
"Onelia Lovell is a 67 y.o. female.     History of Present Illness   /72 (BP Location: Left arm, Patient Position: Sitting, Cuff Size: Adult)  Resp 14  Ht 162.6 cm (64\")  Wt 67.6 kg (149 lb)  BMI 25.58 kg/m2  Patient is being seen for subsequent wellness visit.  Hospitalizations in a past: 3, none in the last year  Once per lifetime Medicare screening tests: ECG - under the care of cardiologist    AAA risk assessment: 1. FH: none. 2.H/o tobacco smoking: none, but had been a subject of second-hand smoking for years. 3. CV or PAD: as per medical problems list.    Tobacco use: none   Alcohol use: none  Illicit drugs use: none  Diet: well balanced  Exercise: 3-4 days a week, walking and resistance training    Anxiety screening: How many days in the last 2 weeks you were  1. Feeling anxious, nervous, on edge: 1-2  2. Unable to stop worrying: none  3. Worrying too much about different things: none  4. Having problems relaxing:none  5. Feeling restless or unable to sit still:none  6. Feeling irritable or easely annoyed: none  7. Being afraid that something awful might happen: none    Depression screening PHQ9:  Over the past 2 weeks how often have you been bothered by  1. Lack of interest or pleasuer in usual activities: none  2. Feeling down, depressed, hopeless:none  3. Troubles falling asleep/sleeping too long:none, compliant with nightly Trazodone use  4. Feeling tired, having little energy: moderate to low  5. Poor appetite or overeating: none  6. Feeling bad about yourself:none.  7. Trouble concentrating: at the baseline.  8. Moving or speaking too slow or much faster than usual: none  9. Thoughts about harming yourself:none.    Cognitive impairment screening:   Do you have difficulties with:  1. Language: no  2. Behavior: no  3. Learning/retaining new information: no  4. Handling complex tasks: no  5. Reasoning: no  6. Spacial ability and orientation: no    Hearing: normal.  Driving: " "unrestricted  ADL: independent  ADL details: Does patient needs help with:  telephone use: no  Transportation: no  Housework: no  Shopping: no  meal preparation: no  laundry: no  managing medication:no  Participate in the social activities: Y    Fall risk factors:   1.Use of alcohol: no    2.Polypharmacy: yes  3.H/o of previous fall:  no  4. Mobility impairment:  no  5. Visual impairment:  no  6. Deconditioning: no  7. Use of antihypertensive medication:  yes  8. Use of sedatives: no  9. Use of antidepressant: yes    Home safety:   1.loose rugs: no   2.unfamiliar environment: no   3. Uneven floors: no   4. No grab bars in the bathroom:  no  5. No banister on stairs: no      Advanced directives: has Living Will. Discussed. I agree with patient wishes and decision..    Co-managers: [facility], dermatology , cardiology Dr.Janet Ocampo, gastroenterologist , allergist - [facility]      /72 (BP Location: Left arm, Patient Position: Sitting, Cuff Size: Adult)  Resp 14  Ht 162.6 cm (64\")  Wt 67.6 kg (149 lb)  BMI 25.58 kg/m2    Current Outpatient Prescriptions:   •  aspirin 81 MG EC tablet, Take  by mouth., Disp: , Rfl:   •  azelastine (ASTELIN) 0.1 % nasal spray, , Disp: , Rfl:   •  Calcium 250 MG capsule, Take  by mouth., Disp: , Rfl:   •  Cetirizine HCl 10 MG capsule, Take  by mouth., Disp: , Rfl:   •  Cholecalciferol (VITAMIN D3) 5000 UNITS capsule capsule, Take 1,000 Units by mouth daily., Disp: , Rfl:   •  Cranberry (HM CRANBERRY SUPER STRENGTH) 300 MG tablet, Take 300 mg by mouth Daily., Disp: , Rfl:   •  isosorbide mononitrate (IMDUR) 30 MG 24 hr tablet, Take 1 tablet by mouth Daily., Disp: 90 tablet, Rfl: 3  •  lisinopril (PRINIVIL,ZESTRIL) 2.5 MG tablet, Take 1 tablet by mouth Daily., Disp: 90 tablet, Rfl: 3  •  LOTEMAX 0.5 % gel ophthalmic gel, , Disp: , Rfl:   •  Magnesium Oxide 400 MG capsule, Take  by mouth., Disp: , Rfl:   •  metoprolol tartrate " "(LOPRESSOR) 25 MG tablet, Take 1 tablet by mouth 2 (Two) Times a Day. Patient's dose has been increased, Disp: 180 tablet, Rfl: 3  •  Multiple Vitamin (MULTI VITAMIN DAILY PO), Take  by mouth., Disp: , Rfl:   •  Omega-3 Fatty Acids (FISH OIL) 1200 MG capsule capsule, Take  by mouth., Disp: , Rfl:   •  PROAIR  (90 Base) MCG/ACT inhaler, , Disp: , Rfl:   •  ranitidine (ZANTAC) 150 MG capsule, Take 1 capsule by mouth 2 (two) times a day., Disp: 60 capsule, Rfl: 11  •  ranolazine (RANEXA) 500 MG 12 hr tablet, Take 500 mg by mouth 2 (Two) Times a Day., Disp: , Rfl:   •  rosuvastatin (CRESTOR) 10 MG tablet, Take 1 tablet by mouth Daily., Disp: 90 tablet, Rfl: 3  •  traZODone (DESYREL) 100 MG tablet, Take 1 tablet by mouth Every Night., Disp: 90 tablet, Rfl: 3  •  valACYclovir (VALTREX) 1000 MG tablet, Take  by mouth every 12 (twelve) hours., Disp: , Rfl:     Patient has long-standing benign essential HTN.  BP is usually well controlled with daily use of b-blocker and ACEI. On low salt diet with fair compliance. Takes medication regularly. Denies chest pain, dyspnea,lightheadedness,  lower extremity edema. Patient does not check blood pressure    Patient has been diagnosed with hyperlipidemia. Target LDL is < 70 mg/dl (\"very high\" risk for CHD). Patient is on low fat diet with good compliance. Patient is compliant with treatment: daily use of Crestor (rozuvastatin). Side effects of medication: none. Exercise 3-4 days a week, walking and resistance training.    Patient also is here for chronicCAD f/u. Takes b-blocker, ACEI, statin, ASA daily. Patient is compliant with treatment, but had discontinued Ranaxa due to the cost. No chest pains or palpitations. Has complains of progressive exertional dyspnea and had noticed minimal lower extremety edema. Under the care of cardiologist. Usually had been seeing Dr.Janet Ocampo once a year. Last myocardial perfusion study was done in 12/2016 and was wnl.    Patient had CT PET " scan ordered by  in 12/2016, and on this study 3 mm LLL lung nodule was found, but was poorly defined. Patient states that she rarely coughs, but has progressive exertional dyspnea. Had been a subject of second hand smoking for years. Weight is stable. C/o being tired, if she goes to the gym,. That took all the energy for the day. Does OK at the gym. Her dyspnea is noticeble when she climbs stairs, and patient states that it is getting progressively worse.                                The following portions of the patient's history were reviewed and updated as appropriate: allergies, current medications, past family history, past medical history, past social history, past surgical history and problem list.    Review of Systems   Constitutional: Negative for chills and fever.   HENT: Negative for postnasal drip, sinus pressure and sore throat.    Eyes: Negative for pain and itching.   Respiratory: Negative for cough and chest tightness.    Cardiovascular: Negative for chest pain and leg swelling.   Gastrointestinal: Negative for abdominal pain and blood in stool.   Endocrine: Negative for cold intolerance and heat intolerance.   Genitourinary: Negative for difficulty urinating and flank pain.   Musculoskeletal: Negative for back pain and neck pain.   Skin: Negative for color change and rash.   Neurological: Negative for dizziness, weakness and headaches.   Hematological: Negative for adenopathy. Does not bruise/bleed easily.   Psychiatric/Behavioral: Negative for sleep disturbance. The patient is not nervous/anxious.        Objective   Physical Exam   Constitutional: She is oriented to person, place, and time. Vital signs are normal. She appears well-developed and well-nourished. No distress.   HENT:   Head: Normocephalic and atraumatic.   Right Ear: External ear normal.   Left Ear: External ear normal.   Nose: Nose normal. No mucosal edema. Right sinus exhibits no maxillary sinus tenderness and no frontal  sinus tenderness. Left sinus exhibits no maxillary sinus tenderness and no frontal sinus tenderness.   Mouth/Throat: Oropharynx is clear and moist. No oropharyngeal exudate.   Eyes: Conjunctivae, EOM and lids are normal. Pupils are equal, round, and reactive to light. Right eye exhibits no discharge. Left eye exhibits no discharge. Right conjunctiva is not injected. Left conjunctiva is not injected. No scleral icterus. Right eye exhibits normal extraocular motion. Left eye exhibits normal extraocular motion.   Neck: Normal range of motion and full passive range of motion without pain. Neck supple. No JVD present. Carotid bruit is not present. No thyromegaly present.   Cardiovascular: Normal rate, regular rhythm, S1 normal, S2 normal, normal heart sounds and intact distal pulses.  PMI is not displaced.  Exam reveals no gallop and no friction rub.    No murmur heard.  Pulmonary/Chest: Effort normal and breath sounds normal. No accessory muscle usage. No respiratory distress. She has no decreased breath sounds. She has no wheezes. She has no rhonchi. She has no rales.   Abdominal: Soft. Bowel sounds are normal. She exhibits no distension, no pulsatile liver, no fluid wave, no abdominal bruit, no ascites and no mass. There is no tenderness. There is no rebound and no guarding.   Musculoskeletal: She exhibits no edema or deformity.   Lymphadenopathy:        Head (right side): No submental, no submandibular, no preauricular, no posterior auricular and no occipital adenopathy present.        Head (left side): No submental, no submandibular, no tonsillar, no preauricular, no posterior auricular and no occipital adenopathy present.     She has no cervical adenopathy.        Right cervical: No superficial cervical, no deep cervical and no posterior cervical adenopathy present.       Left cervical: No superficial cervical, no deep cervical and no posterior cervical adenopathy present.        Right: No supraclavicular  adenopathy present.        Left: No supraclavicular adenopathy present.   Neurological: She is alert and oriented to person, place, and time. She has normal strength and normal reflexes. She displays normal reflexes. No cranial nerve deficit. She exhibits normal muscle tone. Coordination normal.   Reflex Scores:       Bicep reflexes are 2+ on the right side and 2+ on the left side.       Patellar reflexes are 2+ on the right side and 2+ on the left side.  Skin: Skin is warm and dry. No rash noted. She is not diaphoretic. No erythema.   flat scaly bright red polygonal 6 mm patch on the right wrist   Psychiatric: She has a normal mood and affect. Her speech is normal and behavior is normal. Thought content normal.   Vitals reviewed.      Assessment/Plan   Colleen was seen today for subsequent medicare wellness, hypertension, hyperlipidemia, coronary artery disease and shortness of breath.    Diagnoses and all orders for this visit:    Health care maintenance  -     Ambulatory Referral to Gastroenterology    Pure hypercholesterolemia  -     Comprehensive Metabolic Panel; Future  -     Lipid Panel; Future  -     rosuvastatin (CRESTOR) 10 MG tablet; Take 1 tablet by mouth Daily.    Benign essential hypertension  -     lisinopril (PRINIVIL,ZESTRIL) 2.5 MG tablet; Take 1 tablet by mouth Daily.    Coronary artery disease involving autologous artery coronary bypass graft without angina pectoris  -     lisinopril (PRINIVIL,ZESTRIL) 2.5 MG tablet; Take 1 tablet by mouth Daily.  -     metoprolol tartrate (LOPRESSOR) 25 MG tablet; Take 1 tablet by mouth 2 (Two) Times a Day. Patient's dose has been increased    Lung nodule seen on imaging study  -     CT Chest Without Contrast    Need for 23-polyvalent pneumococcal polysaccharide vaccine  -     Pneumococcal Polysaccharide Vaccine 23-Valent Greater Than or Equal To 3yo Subcutaneous / IM    Exertional dyspnea  -     CT Chest Without Contrast    History of adenomatous polyp of  "colon  -     Ambulatory Referral to Gastroenterology    Visit for screening mammogram  -     Mammo Screening Bilateral With CAD    Post-menopause  -     DEXA Bone Density Axial; Future    Impaired fasting glucose  -     Hemoglobin A1c; Future    Primary insomnia  -     traZODone (DESYREL) 100 MG tablet; Take 1 tablet by mouth Every Night.         Annual wellness visit with preventive exam as well as age and risk appropriate councelling completed.    Cardiovascular disease councelling: current. Recommended: Healthy Heart diet recommended., Excellent cholesterol., Patient has known CAD and is under the care of cardiologist.  Diabetes screening and councelling: current. Recommended: Low carb diet recommended. Needs to avoid starches and sweets., Regular aerobic exercise recommended.  Breast cancer screening: is due. Will schedule..  Osteoporosis screening: is due, will schedule. Benefits explained..  Glaucoma screening: up to date.   AAA screening: not needed..  Hep C screening (born between 35[birth year]-8221) completed..  Colorectal cancer screening: is due. Will schedule. Benefits and risks discussed..    Immunizations:   1. Influenza vaccine  is up to date and recommended yearly.   2. Pneumococcal vaccines: Pneumovaccine will be administered today.   3. Zostavax: completed.      Advanced directives planning: completed and Living Will is on file in the hospital. Discussed. I agree with patients decision..    Medications reviewed and medication list updated.   Potential harmful drug-disease interactions in the elderly:statin and impaired fasting blood sugar.  High risk medication in elderly: none  ASA use: continue daily ASA 81 mg.    HTN - well controlled with current medication regimen. Normal kidney tests and electrolytes. Recommended low salt diet. Continue same medical treatment.  Hyperlipidemia - well controlled with statin. Normal liver function tests. LDL target is below < 70 mg/dl (\"very high\" risk for CHD). " Patient's 70. Continue same treatment. Regular exercise recommended.  Impaired fasting blood sugars - normal fasting blood sugar on the last test. Hb A1C is still elevated  Lab Results   Component Value Date    HGBA1C 5.96 (H) 02/02/2018     Low carb diet and regular exercise recommended.  CAD - stable. Under the care of cardiologist. Excellent cholesterol control.  Exertional dyspnea, worsening - possibly due to diastolic dysfunction and unrestricted use of dietary salt - need for low salt diet explained, other possibility will be COPD due to the h/o second hand smoking. Patient hd spirometry in her allergist office and had been diagnosed with some obstructive lung disease. Will try Spiriva - samples. Will also proceed with imaging study as below.  Lung nodule seen on CT PET test done a year ago - will refer for the CT scan, as patient had been a subject of second hand smoking for years, and c/o diminished exercise tolerance and dyspnea.  Insomnia - well controlled with Trazodone, will continue same.  Skin lesion, uncertain behavior - needs to see dermatologist.  .

## 2018-02-09 NOTE — PATIENT INSTRUCTIONS
"Annual wellness visit with preventive exam as well as age and risk appropriate councelling completed.    Cardiovascular disease councelling: current. Recommended: Healthy Heart diet recommended., Excellent cholesterol., Patient has known CAD and is under the care of cardiologist.  Diabetes screening and councelling: current. Recommended: Low carb diet recommended. Needs to avoid starches and sweets., Regular aerobic exercise recommended.  Breast cancer screening: is due. Will schedule..  Osteoporosis screening: is due, will schedule. Benefits explained..  Glaucoma screening: up to date.   AAA screening: not needed..  Hep C screening (born between 9716-0302) completed..  Colorectal cancer screening: is due. Will schedule. Benefits and risks discussed..    Immunizations:   1. Influenza vaccine  is up to date and recommended yearly.   2. Pneumococcal vaccines: Pneumovaccine will be administered today.   3. Zostavax: completed.      Advanced directives planning: completed and Living Will is on file in the hospital. Discussed. I agree with patients decision..    Medications reviewed and medication list updated.   Potential harmful drug-disease interactions in the elderly:statin and impaired fasting blood sugar.  High risk medication in elderly: none  ASA use: continue daily ASA 81 mg.    HTN - well controlled with current medication regimen. Normal kidney tests and electrolytes. Recommended low salt diet. Continue same medical treatment.  Hyperlipidemia - well controlled with statin. Normal liver function tests. LDL target is below < 70 mg/dl (\"very high\" risk for CHD). Patient's 70. Continue same treatment. Regular exercise recommended.  Impaired fasting blood sugars - normal fasting blood sugar on the last test. Hb A1C is still elevated  Lab Results   Component Value Date    HGBA1C 5.96 (H) 02/02/2018     Low carb diet and regular exercise recommended.  CAD - stable. Under the care of cardiologist. Excellent " cholesterol control.  Exertional dyspnea, worsening - possibly due to diastolic dysfunction and unrestricted use of dietary salt - need for low salt diet explained, other possibility will be COPD due to the h/o second hand smoking. Patient hd spirometry in her allergist office and had been diagnosed with some obstructive lung disease. Will try Spiriva - samples. Will also proceed with imaging study as below.  Lung nodule seen on CT PET test done a year ago - will refer for the CT scan, as patient had been a subject of second hand smoking for years, and c/o diminished exercise tolerance and dyspnea.  Insomnia - well controlled with Trazodone, will continue same.  Skin lesion right wrist - needs to see dermatologist.  Return in about 2 weeks (around 2/23/2018) for dyspnea.

## 2018-02-14 ENCOUNTER — HOSPITAL ENCOUNTER (OUTPATIENT)
Dept: CT IMAGING | Facility: HOSPITAL | Age: 68
Discharge: HOME | End: 2018-02-14
Admitting: INTERNAL MEDICINE

## 2018-02-14 PROCEDURE — 71250 CT THORAX DX C-: CPT

## 2018-02-15 NOTE — PROGRESS NOTES
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

## 2018-02-19 ENCOUNTER — OFFICE VISIT (OUTPATIENT)
Dept: INTERNAL MEDICINE | Facility: CLINIC | Age: 68
End: 2018-02-19

## 2018-02-19 VITALS
OXYGEN SATURATION: 98 % | BODY MASS INDEX: 25.27 KG/M2 | SYSTOLIC BLOOD PRESSURE: 110 MMHG | HEIGHT: 64 IN | DIASTOLIC BLOOD PRESSURE: 72 MMHG | WEIGHT: 148 LBS | TEMPERATURE: 98.4 F | HEART RATE: 79 BPM

## 2018-02-19 DIAGNOSIS — J34.89 NASAL PAIN: Primary | ICD-10-CM

## 2018-02-19 DIAGNOSIS — I25.810 CORONARY ARTERY DISEASE INVOLVING AUTOLOGOUS ARTERY CORONARY BYPASS GRAFT WITHOUT ANGINA PECTORIS: ICD-10-CM

## 2018-02-19 PROCEDURE — 99213 OFFICE O/P EST LOW 20 MIN: CPT | Performed by: NURSE PRACTITIONER

## 2018-02-19 RX ORDER — ISOSORBIDE MONONITRATE 30 MG/1
30 TABLET, EXTENDED RELEASE ORAL DAILY
Qty: 90 TABLET | Refills: 1 | Status: SHIPPED | OUTPATIENT
Start: 2018-02-19 | End: 2018-07-02 | Stop reason: SDUPTHER

## 2018-02-19 RX ORDER — CEPHALEXIN 500 MG/1
500 CAPSULE ORAL 3 TIMES DAILY
Qty: 21 CAPSULE | Refills: 0 | Status: SHIPPED | OUTPATIENT
Start: 2018-02-19 | End: 2018-02-26

## 2018-02-19 NOTE — PROGRESS NOTES
Subjective   Colleen Lovell is a 67 y.o. female.     HPI Comments: She presents today secondary to nasal pain. This pain is intermittent and is aggravated by palpation only. She has had some redness and swelling to nasal bridge. She denies any ulcer or pimple. She did started taking xiidra last Tuesday secondary to dry eyes. She reports no new facial products or makeup. She has been use eye heat pack (started using it three weeks ago). She has also been having eye swelling, which has been going on for approximately 2 months. She denies any trauma to the nasal bridge. Also no fever. She has nasal congestion, which is normal with her chronic allergies. She has not applied anything to nose.        The following portions of the patient's history were reviewed and updated as appropriate: allergies, current medications and problem list.    Review of Systems   Constitutional: Negative for activity change, appetite change, fatigue and fever.   HENT: Positive for congestion (nasal, normal with allergies ). Negative for sinus pain and sinus pressure.         Pain and redness along nasal bridge, also swelling, better today   Eyes: Positive for discharge (chronic ), redness (chronic ) and itching (chronic ). Negative for pain.   Respiratory: Negative for cough, shortness of breath and wheezing.    Cardiovascular: Negative for chest pain.   Allergic/Immunologic: Positive for environmental allergies.       Objective   Physical Exam   Constitutional: She appears well-developed and well-nourished. She is cooperative. She does not have a sickly appearance. She does not appear ill.   HENT:   Head: Normocephalic.   Right Ear: Hearing, tympanic membrane and external ear normal. No drainage, swelling or tenderness. No mastoid tenderness. Tympanic membrane is not injected, not scarred, not erythematous and not bulging. Tympanic membrane mobility is normal. No middle ear effusion. No decreased hearing is noted.   Left Ear: Hearing and  external ear normal. No drainage, swelling or tenderness. No mastoid tenderness. Tympanic membrane is not injected, not scarred, not erythematous and not bulging. Tympanic membrane mobility is normal.  No middle ear effusion. No decreased hearing is noted.   Nose: Nose normal. No mucosal edema, rhinorrhea, sinus tenderness or nasal deformity. No epistaxis.  No foreign bodies. Right sinus exhibits no maxillary sinus tenderness and no frontal sinus tenderness. Left sinus exhibits no maxillary sinus tenderness and no frontal sinus tenderness.       Mouth/Throat: Oropharynx is clear and moist and mucous membranes are normal. Normal dentition. No posterior oropharyngeal erythema. No tonsillar exudate.   Mild erythema and tenderness with palpation only to nasal bridge   Eyes: Conjunctivae, EOM and lids are normal. Pupils are equal, round, and reactive to light. Right eye exhibits no discharge and no exudate. Left eye exhibits no discharge and no exudate.   Neck: Trachea normal and normal range of motion. No edema present. No thyroid mass and no thyromegaly present.   Cardiovascular: Regular rhythm, normal heart sounds and normal pulses.    No murmur heard.  Pulmonary/Chest: Breath sounds normal. No respiratory distress. She has no decreased breath sounds. She has no wheezes. She has no rhonchi. She has no rales.   Lymphadenopathy:        Head (right side): No submental, no submandibular, no tonsillar, no preauricular, no posterior auricular and no occipital adenopathy present.        Head (left side): No submental, no submandibular, no tonsillar, no preauricular, no posterior auricular and no occipital adenopathy present.     She has no cervical adenopathy.   Neurological: She is alert.   Skin: Skin is warm, dry and intact. No cyanosis. Nails show no clubbing.       Assessment/Plan   Colleen was seen today for nose problem.    Diagnoses and all orders for this visit:    Nasal pain  Comments:  ? cellulitis   Orders:  -      cephalexin (KEFLEX) 500 MG capsule; Take 1 capsule by mouth 3 (Three) Times a Day for 7 days.    Coronary artery disease involving autologous artery coronary bypass graft without angina pectoris  -     isosorbide mononitrate (IMDUR) 30 MG 24 hr tablet; Take 1 tablet by mouth Daily.    ? Infection. Discussed with Dr De Souza and will treat. She has been instructed to return sooner if worsening of symptoms (increase pain, redness, etc).

## 2018-02-19 NOTE — PATIENT INSTRUCTIONS
Cellulitis, Adult  Cellulitis is a skin infection. The infected area is usually red and tender. This condition occurs most often in the arms and lower legs. The infection can travel to the muscles, blood, and underlying tissue and become serious. It is very important to get treated for this condition.  What are the causes?  Cellulitis is caused by bacteria. The bacteria enter through a break in the skin, such as a cut, burn, insect bite, open sore, or crack.  What increases the risk?  This condition is more likely to occur in people who:  · Have a weak defense system (immune system).  · Have open wounds on the skin such as cuts, burns, bites, and scrapes. Bacteria can enter the body through these open wounds.  · Are older.  · Have diabetes.  · Have a type of long-lasting (chronic) liver disease (cirrhosis) or kidney disease.  · Use IV drugs.  What are the signs or symptoms?  Symptoms of this condition include:  · Redness, streaking, or spotting on the skin.  · Swollen area of the skin.  · Tenderness or pain when an area of the skin is touched.  · Warm skin.  · Fever.  · Chills.  · Blisters.  How is this diagnosed?  This condition is diagnosed based on a medical history and physical exam. You may also have tests, including:  · Blood tests.  · Lab tests.  · Imaging tests.  How is this treated?  Treatment for this condition may include:  · Medicines, such as antibiotic medicines or antihistamines.  · Supportive care, such as rest and application of cold or warm cloths (cold or warm compresses) to the skin.  · Hospital care, if the condition is severe.  The infection usually gets better within 1-2 days of treatment.  Follow these instructions at home:  · Take over-the-counter and prescription medicines only as told by your health care provider.  · If you were prescribed an antibiotic medicine, take it as told by your health care provider. Do not stop taking the antibiotic even if you start to feel better.  · Drink  enough fluid to keep your urine clear or pale yellow.  · Do not touch or rub the infected area.  · Raise (elevate) the infected area above the level of your heart while you are sitting or lying down.  · Apply warm or cold compresses to the affected area as told by your health care provider.  · Keep all follow-up visits as told by your health care provider. This is important. These visits let your health care provider make sure a more serious infection is not developing.  Contact a health care provider if:  · You have a fever.  · Your symptoms do not improve within 1-2 days of starting treatment.  · Your bone or joint underneath the infected area becomes painful after the skin has healed.  · Your infection returns in the same area or another area.  · You notice a swollen bump in the infected area.  · You develop new symptoms.  · You have a general ill feeling (malaise) with muscle aches and pains.  Get help right away if:  · Your symptoms get worse.  · You feel very sleepy.  · You develop vomiting or diarrhea that persists.  · You notice red streaks coming from the infected area.  · Your red area gets larger or turns dark in color.  This information is not intended to replace advice given to you by your health care provider. Make sure you discuss any questions you have with your health care provider.  Document Released: 09/27/2006 Document Revised: 04/27/2017 Document Reviewed: 10/26/2016  [employer] Interactive Patient Education © 2017 [employer]

## 2018-02-23 ENCOUNTER — CLINICAL SUPPORT (OUTPATIENT)
Dept: INTERNAL MEDICINE | Facility: CLINIC | Age: 68
End: 2018-02-23

## 2018-02-23 ENCOUNTER — OFFICE VISIT (OUTPATIENT)
Dept: INTERNAL MEDICINE | Facility: CLINIC | Age: 68
End: 2018-02-23

## 2018-02-23 VITALS
HEIGHT: 64 IN | SYSTOLIC BLOOD PRESSURE: 118 MMHG | BODY MASS INDEX: 25.27 KG/M2 | WEIGHT: 148 LBS | DIASTOLIC BLOOD PRESSURE: 72 MMHG

## 2018-02-23 DIAGNOSIS — J01.00 ACUTE NON-RECURRENT MAXILLARY SINUSITIS: ICD-10-CM

## 2018-02-23 DIAGNOSIS — R06.09 EXERTIONAL DYSPNEA: ICD-10-CM

## 2018-02-23 DIAGNOSIS — Z78.0 POST-MENOPAUSE: ICD-10-CM

## 2018-02-23 DIAGNOSIS — R91.1 LUNG NODULE SEEN ON IMAGING STUDY: Primary | ICD-10-CM

## 2018-02-23 PROCEDURE — 77080 DXA BONE DENSITY AXIAL: CPT | Performed by: INTERNAL MEDICINE

## 2018-02-23 PROCEDURE — 99214 OFFICE O/P EST MOD 30 MIN: CPT | Performed by: INTERNAL MEDICINE

## 2018-02-23 NOTE — PATIENT INSTRUCTIONS
1. OGD on the CR scan - reassured, no further tests needed.  2. Acute sinusitis - much improved with treatment.  3. Exertional dyspnea and tachycardia - under the care of cardiologist, had negative stress test a year ago.Will check PFTs.

## 2018-02-23 NOTE — PROGRESS NOTES
"Subjective   Colleen Lovell is a 67 y.o. female.     History of Present Illness   Colleen Lovell 67 y.o. female presents today for nasal pain and dyspnea f/u. f/u. Last was seen on 02/09/2018 andd on that visit we had ordered chest CT scan - report was benign with evidence of old granulomatous disease. She reports that her sister was ill with histoplasmosis years ago and family had to  [place].  Also patient had pablo seen in acute clinic complaining f the redness and pain across the nasal bridge. Started on Keflex - back to normal now.   Patient is compliant with treatment and denies  side effects. Patient reports resolution of the symptoms.   I had walked stairs with patient today, and her O2 Saturation stayed above 96%, but heart rate was up to 122.    /72 (BP Location: Right arm, Patient Position: Sitting, Cuff Size: Adult)  Ht 162.6 cm (64\")  Wt 67.1 kg (148 lb)  BMI 25.4 kg/m2    Current Outpatient Prescriptions:   •  ALLERGY SERUM INJECTION, Inject 1 mL under the skin Every 30 (Thirty) Days., Disp: , Rfl:   •  aspirin 81 MG EC tablet, Take  by mouth., Disp: , Rfl:   •  azelastine (ASTELIN) 0.1 % nasal spray, , Disp: , Rfl:   •  Calcium 250 MG capsule, Take  by mouth., Disp: , Rfl:   •  cephalexin (KEFLEX) 500 MG capsule, Take 1 capsule by mouth 3 (Three) Times a Day for 7 days., Disp: 21 capsule, Rfl: 0  •  Cetirizine HCl 10 MG capsule, Take  by mouth., Disp: , Rfl:   •  Cholecalciferol (VITAMIN D3) 5000 UNITS capsule capsule, Take 1,000 Units by mouth daily., Disp: , Rfl:   •  Cranberry (HM CRANBERRY SUPER STRENGTH) 300 MG tablet, Take 300 mg by mouth Daily., Disp: , Rfl:   •  isosorbide mononitrate (IMDUR) 30 MG 24 hr tablet, Take 1 tablet by mouth Daily., Disp: 90 tablet, Rfl: 1  •  Lifitegrast (XIIDRA) 5 % solution, Apply  to eye., Disp: , Rfl:   •  lisinopril (PRINIVIL,ZESTRIL) 2.5 MG tablet, Take 1 tablet by mouth Daily., Disp: 90 tablet, Rfl: 3  •  Magnesium Oxide 400 MG capsule, " Take  by mouth., Disp: , Rfl:   •  metoprolol tartrate (LOPRESSOR) 25 MG tablet, Take 1 tablet by mouth 2 (Two) Times a Day. Patient's dose has been increased, Disp: 180 tablet, Rfl: 3  •  Multiple Vitamin (MULTI VITAMIN DAILY PO), Take  by mouth., Disp: , Rfl:   •  Omega-3 Fatty Acids (FISH OIL) 1200 MG capsule capsule, Take  by mouth., Disp: , Rfl:   •  PROAIR  (90 Base) MCG/ACT inhaler, , Disp: , Rfl:   •  ranitidine (ZANTAC) 150 MG capsule, Take 1 capsule by mouth 2 (two) times a day., Disp: 60 capsule, Rfl: 11  •  rosuvastatin (CRESTOR) 10 MG tablet, Take 1 tablet by mouth Daily., Disp: 90 tablet, Rfl: 3  •  traZODone (DESYREL) 100 MG tablet, Take 1 tablet by mouth Every Night., Disp: 90 tablet, Rfl: 3  •  valACYclovir (VALTREX) 1000 MG tablet, Take  by mouth every 12 (twelve) hours., Disp: , Rfl:   The following portions of the patient's history were reviewed and updated as appropriate: allergies, current medications, past family history, past medical history, past social history, past surgical history and problem list.    Review of Systems   Constitutional: Negative for chills and fever.   Eyes: Negative for pain and redness.   Respiratory: Negative for cough and shortness of breath.    Cardiovascular: Negative for chest pain and leg swelling.   Neurological: Negative for dizziness and headaches.       Objective   Physical Exam   Constitutional: She is oriented to person, place, and time. She appears well-developed and well-nourished.   HENT:   Head: Normocephalic and atraumatic.   Right Ear: Tympanic membrane, external ear and ear canal normal.   Left Ear: Tympanic membrane, external ear and ear canal normal.   Nose: Nose normal. Right sinus exhibits no maxillary sinus tenderness and no frontal sinus tenderness. Left sinus exhibits no maxillary sinus tenderness and no frontal sinus tenderness.   Mouth/Throat: Uvula is midline, oropharynx is clear and moist and mucous membranes are normal.   Eyes:  Conjunctivae and EOM are normal. Pupils are equal, round, and reactive to light. Right eye exhibits no discharge. Left eye exhibits no discharge. No scleral icterus.   Neck: Neck supple. No JVD present.   Cardiovascular: Normal rate, regular rhythm and normal heart sounds.  Exam reveals no gallop and no friction rub.    No murmur heard.  Pulmonary/Chest: Effort normal and breath sounds normal. She has no wheezes. She has no rales.   Musculoskeletal: She exhibits no edema.   Lymphadenopathy:     She has no cervical adenopathy.   Neurological: She is alert and oriented to person, place, and time. No cranial nerve deficit.   Skin: Skin is warm and dry. No rash noted.   Psychiatric: She has a normal mood and affect. Her behavior is normal.   Vitals reviewed.      Assessment/Plan   Diagnoses and all orders for this visit:    Lung nodule seen on imaging study    Exertional dyspnea    Acute non-recurrent maxillary sinusitis      1. OGD on the CR scan - reassured, no further tests needed.  2. Acute sinusitis - much improved with treatment.  3. Exertional dyspnea and tachycardia - under the care of cardiologist, had negative stress test a year ago.Had normal PFTs in her allergist.

## 2018-07-02 DIAGNOSIS — I25.810 CORONARY ARTERY DISEASE INVOLVING AUTOLOGOUS ARTERY CORONARY BYPASS GRAFT WITHOUT ANGINA PECTORIS: ICD-10-CM

## 2018-07-02 RX ORDER — ISOSORBIDE MONONITRATE 30 MG/1
TABLET, EXTENDED RELEASE ORAL
Qty: 90 TABLET | Refills: 1 | Status: SHIPPED | OUTPATIENT
Start: 2018-07-02 | End: 2018-12-19 | Stop reason: SDUPTHER

## 2018-07-10 ENCOUNTER — OFFICE VISIT (OUTPATIENT)
Dept: INTERNAL MEDICINE | Facility: CLINIC | Age: 68
End: 2018-07-10

## 2018-07-10 VITALS
DIASTOLIC BLOOD PRESSURE: 82 MMHG | OXYGEN SATURATION: 98 % | TEMPERATURE: 98 F | BODY MASS INDEX: 25.95 KG/M2 | HEIGHT: 64 IN | HEART RATE: 80 BPM | WEIGHT: 152 LBS | SYSTOLIC BLOOD PRESSURE: 122 MMHG

## 2018-07-10 DIAGNOSIS — R82.90 ABNORMAL URINE FINDINGS: ICD-10-CM

## 2018-07-10 DIAGNOSIS — N30.00 ACUTE CYSTITIS WITHOUT HEMATURIA: Primary | ICD-10-CM

## 2018-07-10 LAB
BACTERIA UR QL AUTO: ABNORMAL /HPF
BILIRUB UR QL STRIP: NEGATIVE
CLARITY UR: ABNORMAL
COLOR UR: YELLOW
GLUCOSE UR STRIP-MCNC: NEGATIVE MG/DL
HGB UR QL STRIP.AUTO: NEGATIVE
HYALINE CASTS UR QL AUTO: ABNORMAL /LPF
KETONES UR QL STRIP: NEGATIVE
LEUKOCYTE ESTERASE UR QL STRIP.AUTO: ABNORMAL
NITRITE UR QL STRIP: NEGATIVE
PH UR STRIP.AUTO: 5.5 [PH] (ref 5–8)
PROT UR QL STRIP: NEGATIVE
RBC # UR: ABNORMAL /HPF
REF LAB TEST METHOD: ABNORMAL
SP GR UR STRIP: 1.01 (ref 1–1.03)
SQUAMOUS #/AREA URNS HPF: ABNORMAL /HPF
UROBILINOGEN UR QL STRIP: ABNORMAL
WBC CLUMPS # UR AUTO: ABNORMAL /HPF
WBC UR QL AUTO: ABNORMAL /HPF

## 2018-07-10 PROCEDURE — 81001 URINALYSIS AUTO W/SCOPE: CPT | Performed by: NURSE PRACTITIONER

## 2018-07-10 PROCEDURE — 99213 OFFICE O/P EST LOW 20 MIN: CPT | Performed by: NURSE PRACTITIONER

## 2018-07-10 PROCEDURE — 87186 SC STD MICRODIL/AGAR DIL: CPT | Performed by: NURSE PRACTITIONER

## 2018-07-10 PROCEDURE — 87086 URINE CULTURE/COLONY COUNT: CPT | Performed by: NURSE PRACTITIONER

## 2018-07-10 RX ORDER — NITROFURANTOIN 25; 75 MG/1; MG/1
100 CAPSULE ORAL EVERY 12 HOURS SCHEDULED
Qty: 14 CAPSULE | Refills: 0 | Status: SHIPPED | OUTPATIENT
Start: 2018-07-10 | End: 2018-07-17

## 2018-07-10 RX ORDER — PHENAZOPYRIDINE HYDROCHLORIDE 200 MG/1
200 TABLET, FILM COATED ORAL 3 TIMES DAILY PRN
Qty: 6 TABLET | Refills: 0 | Status: SHIPPED | OUTPATIENT
Start: 2018-07-10 | End: 2018-07-12

## 2018-07-10 NOTE — PROGRESS NOTES
"Subjective   Colleen Lovell is a 67 y.o. female who presents due to urinary symptoms.    She took a course of Cipro, finished Friday but continues to c/o dysuria with suprapubic pressure. Sx have worsened since d/c antibiotic.      Urinary Tract Infection    This is a new problem. The current episode started 1 to 4 weeks ago (sx began nima 2 weeks ago). The problem occurs every urination. The problem has been unchanged. The quality of the pain is described as aching and burning. The pain is moderate. There has been no fever. Associated symptoms include frequency, hesitancy, nausea and urgency. Pertinent negatives include no chills, flank pain or hematuria. Associated symptoms comments: C/o suprapubic pressure, worse with finished voiding. She has tried antibiotics for the symptoms. The treatment provided mild relief.        The following portions of the patient's history were reviewed and updated as appropriate: allergies, current medications, past social history and problem list.    Past Medical History:   Diagnosis Date   • Abnormal liver function test    • CAD (coronary artery disease)     , s/p CABG, \"-\"PET scan 2011, reversible ischemia on Lexiscan PET perfusion study  Dr.Janet Ocampo   • Cholelithiasis      CT   • Herpes simplex type 1 infection     orolabial   • Herpes zoster     2010   • Hx of bone scan      11: sp -1.8, hip -0.5, fem neck -0.8 14: sp -1.7, hip -0.6, fem neck -1.0   • Incidental lung nodule, less than or equal to 3mm     CTPET 2016 [place], no f/u needed   • Insomnia    • Internal hemorrhoids    • Obstructive sleep apnea     mild    • Osteopenia     Dexa 11 sp -1.8 hip -0.5 fem neck -0.8   • Papanicolaou smear     2011   • Pregnancy        • Rotator cuff tendinitis        • Tubular adenoma of colon     , , cecal polyp, 2012, , adenoma with mild dysplasia 4 mm ascending colon         Current Outpatient " Prescriptions:   •  ALLERGY SERUM INJECTION, Inject 1 mL under the skin Every 30 (Thirty) Days., Disp: , Rfl:   •  aspirin 81 MG EC tablet, Take  by mouth., Disp: , Rfl:   •  azelastine (ASTELIN) 0.1 % nasal spray, , Disp: , Rfl:   •  Calcium 250 MG capsule, Take  by mouth., Disp: , Rfl:   •  Cetirizine HCl 10 MG capsule, Take  by mouth., Disp: , Rfl:   •  Cholecalciferol (VITAMIN D3) 5000 UNITS capsule capsule, Take 1,000 Units by mouth daily., Disp: , Rfl:   •  Cranberry (HM CRANBERRY SUPER STRENGTH) 300 MG tablet, Take 300 mg by mouth Daily., Disp: , Rfl:   •  isosorbide mononitrate (IMDUR) 30 MG 24 hr tablet, TAKE 1 TABLET EVERY DAY, Disp: 90 tablet, Rfl: 1  •  Lifitegrast (XIIDRA) 5 % solution, Apply  to eye., Disp: , Rfl:   •  lisinopril (PRINIVIL,ZESTRIL) 2.5 MG tablet, Take 1 tablet by mouth Daily., Disp: 90 tablet, Rfl: 3  •  Magnesium Oxide 400 MG capsule, Take  by mouth., Disp: , Rfl:   •  metoprolol tartrate (LOPRESSOR) 25 MG tablet, Take 1 tablet by mouth 2 (Two) Times a Day. Patient's dose has been increased, Disp: 180 tablet, Rfl: 3  •  Multiple Vitamin (MULTI VITAMIN DAILY PO), Take  by mouth., Disp: , Rfl:   •  Omega-3 Fatty Acids (FISH OIL) 1200 MG capsule capsule, Take  by mouth., Disp: , Rfl:   •  PROAIR  (90 Base) MCG/ACT inhaler, , Disp: , Rfl:   •  ranitidine (ZANTAC) 150 MG capsule, Take 1 capsule by mouth 2 (two) times a day., Disp: 60 capsule, Rfl: 11  •  rosuvastatin (CRESTOR) 10 MG tablet, Take 1 tablet by mouth Daily., Disp: 90 tablet, Rfl: 3  •  traZODone (DESYREL) 100 MG tablet, Take 1 tablet by mouth Every Night., Disp: 90 tablet, Rfl: 3  •  valACYclovir (VALTREX) 1000 MG tablet, Take  by mouth every 12 (twelve) hours., Disp: , Rfl:   •  nitrofurantoin, macrocrystal-monohydrate, (MACROBID) 100 MG capsule, Take 1 capsule by mouth Every 12 (Twelve) Hours for 7 days., Disp: 14 capsule, Rfl: 0  •  phenazopyridine (PYRIDIUM) 200 MG tablet, Take 1 tablet by mouth 3 (Three) Times a  "Day As Needed for bladder spasms for up to 2 days., Disp: 6 tablet, Rfl: 0    No Known Allergies    Review of Systems   Constitutional: Positive for fatigue (does not feel well). Negative for appetite change, chills and fever.   HENT: Negative for congestion.    Eyes: Negative for visual disturbance.   Respiratory: Negative for chest tightness and shortness of breath.    Cardiovascular: Negative for chest pain.   Gastrointestinal: Positive for abdominal pain (c/o suprapubic pressure) and nausea. Negative for constipation and diarrhea.   Genitourinary: Positive for dysuria, frequency, hesitancy and urgency. Negative for flank pain and hematuria.   Musculoskeletal: Negative for back pain (mild discomfort in low back).   Neurological: Negative for headaches.       Objective   Vitals:    07/10/18 1344   BP: 122/82   BP Location: Left arm   Patient Position: Sitting   Cuff Size: Adult   Pulse: 80   Temp: 98 °F (36.7 °C)   TempSrc: Oral   SpO2: 98%   Weight: 68.9 kg (152 lb)   Height: 162.6 cm (64\")     Physical Exam   Constitutional: She appears well-developed and well-nourished.   HENT:   Head: Normocephalic.   Right Ear: External ear normal.   Eyes: Conjunctivae are normal.   Cardiovascular: Normal rate, regular rhythm and normal heart sounds.    Pulmonary/Chest: Effort normal and breath sounds normal.   Abdominal: Soft. Bowel sounds are normal. There is tenderness in the suprapubic area. There is no rigidity, no rebound, no guarding and no CVA tenderness.   Lymphadenopathy:     She has no cervical adenopathy.   Skin: Skin is warm and dry.   Psychiatric: She has a normal mood and affect. Her behavior is normal.   Nursing note and vitals reviewed.      Assessment/Plan   Colleen was seen today for urinary tract infection.    Diagnoses and all orders for this visit:    Acute cystitis without hematuria  Comments:  UA +bacteria, WBCs-will start antibiotic and send urine for culture  Orders:  -     Urinalysis With Microscopic " If Indicated (No Culture) - Urine, Clean Catch  -     Urinalysis, Microscopic Only - Urine, Clean Catch; Future  -     Urinalysis, Microscopic Only - Urine, Clean Catch  -     Urine Culture - Urine, Urine, Clean Catch; Future  -     Urine Culture - Urine, Urine, Clean Catch  -     nitrofurantoin, macrocrystal-monohydrate, (MACROBID) 100 MG capsule; Take 1 capsule by mouth Every 12 (Twelve) Hours for 7 days.  -     phenazopyridine (PYRIDIUM) 200 MG tablet; Take 1 tablet by mouth 3 (Three) Times a Day As Needed for bladder spasms for up to 2 days.    Abnormal urine findings  -     Urine Culture - Urine, Urine, Clean Catch; Future  -     Urine Culture - Urine, Urine, Clean Catch

## 2018-07-12 ENCOUNTER — TELEPHONE (OUTPATIENT)
Dept: INTERNAL MEDICINE | Facility: CLINIC | Age: 68
End: 2018-07-12

## 2018-07-12 DIAGNOSIS — B96.29 UTI DUE TO EXTENDED-SPECTRUM BETA LACTAMASE (ESBL) PRODUCING ESCHERICHIA COLI: Primary | ICD-10-CM

## 2018-07-12 DIAGNOSIS — N39.0 UTI DUE TO EXTENDED-SPECTRUM BETA LACTAMASE (ESBL) PRODUCING ESCHERICHIA COLI: Primary | ICD-10-CM

## 2018-07-12 DIAGNOSIS — Z16.12 UTI DUE TO EXTENDED-SPECTRUM BETA LACTAMASE (ESBL) PRODUCING ESCHERICHIA COLI: Primary | ICD-10-CM

## 2018-07-12 LAB — BACTERIA SPEC AEROBE CULT: ABNORMAL

## 2018-07-12 NOTE — TELEPHONE ENCOUNTER
----- Message from Shira Robison sent at 7/12/2018 12:13 PM EDT -----  Contact: [facility]  Patient's urine culture growing E. coli ESBL.      Patient:  [phone] (H)               [phone] (M)    Pharmacy:  [address] [phone] [facility] [phone]

## 2018-07-12 NOTE — TELEPHONE ENCOUNTER
Pt treated with Macrobid on 7/10-discussed with results with patient, she is starting to feel better and will return for repeat UA after completion of antibiotics.

## 2018-07-19 ENCOUNTER — LAB (OUTPATIENT)
Dept: INTERNAL MEDICINE | Facility: CLINIC | Age: 68
End: 2018-07-19

## 2018-07-19 DIAGNOSIS — B96.29 UTI DUE TO EXTENDED-SPECTRUM BETA LACTAMASE (ESBL) PRODUCING ESCHERICHIA COLI: ICD-10-CM

## 2018-07-19 DIAGNOSIS — Z16.12 UTI DUE TO EXTENDED-SPECTRUM BETA LACTAMASE (ESBL) PRODUCING ESCHERICHIA COLI: ICD-10-CM

## 2018-07-19 DIAGNOSIS — N39.0 UTI DUE TO EXTENDED-SPECTRUM BETA LACTAMASE (ESBL) PRODUCING ESCHERICHIA COLI: ICD-10-CM

## 2018-07-19 LAB
BILIRUB UR QL STRIP: NEGATIVE
CLARITY UR: CLEAR
COLOR UR: YELLOW
GLUCOSE UR STRIP-MCNC: NEGATIVE MG/DL
HGB UR QL STRIP.AUTO: NEGATIVE
KETONES UR QL STRIP: NEGATIVE
LEUKOCYTE ESTERASE UR QL STRIP.AUTO: NEGATIVE
NITRITE UR QL STRIP: NEGATIVE
PH UR STRIP.AUTO: 5.5 [PH] (ref 5–8)
PROT UR QL STRIP: NEGATIVE
SP GR UR STRIP: >=1.03 (ref 1–1.03)
UROBILINOGEN UR QL STRIP: NORMAL

## 2018-07-19 PROCEDURE — 81003 URINALYSIS AUTO W/O SCOPE: CPT | Performed by: NURSE PRACTITIONER

## 2018-08-03 ENCOUNTER — LAB (OUTPATIENT)
Dept: INTERNAL MEDICINE | Facility: CLINIC | Age: 68
End: 2018-08-03

## 2018-08-03 DIAGNOSIS — R73.01 IMPAIRED FASTING GLUCOSE: ICD-10-CM

## 2018-08-03 DIAGNOSIS — E78.00 PURE HYPERCHOLESTEROLEMIA: ICD-10-CM

## 2018-08-03 LAB
ALBUMIN SERPL-MCNC: 4.3 G/DL (ref 3.5–5.2)
ALBUMIN/GLOB SERPL: 1.7 G/DL
ALP SERPL-CCNC: 87 U/L (ref 39–117)
ALT SERPL W P-5'-P-CCNC: 20 U/L (ref 1–33)
ANION GAP SERPL CALCULATED.3IONS-SCNC: 12.2 MMOL/L
AST SERPL-CCNC: 25 U/L (ref 1–32)
BILIRUB SERPL-MCNC: 1.4 MG/DL (ref 0.1–1.2)
BUN BLD-MCNC: 14 MG/DL (ref 8–23)
BUN/CREAT SERPL: 17.3 (ref 7–25)
CALCIUM SPEC-SCNC: 9.3 MG/DL (ref 8.6–10.5)
CHLORIDE SERPL-SCNC: 105 MMOL/L (ref 98–107)
CHOLEST SERPL-MCNC: 130 MG/DL (ref 0–200)
CO2 SERPL-SCNC: 23.8 MMOL/L (ref 22–29)
CREAT BLD-MCNC: 0.81 MG/DL (ref 0.57–1)
GFR SERPL CREATININE-BSD FRML MDRD: 71 ML/MIN/1.73
GLOBULIN UR ELPH-MCNC: 2.6 GM/DL
GLUCOSE BLD-MCNC: 95 MG/DL (ref 65–99)
HBA1C MFR BLD: 5.5 % (ref 4.8–5.6)
HDLC SERPL-MCNC: 48 MG/DL (ref 40–60)
LDLC SERPL CALC-MCNC: 64 MG/DL (ref 0–100)
LDLC/HDLC SERPL: 1.33 {RATIO}
POTASSIUM BLD-SCNC: 4.2 MMOL/L (ref 3.5–5.2)
PROT SERPL-MCNC: 6.9 G/DL (ref 6–8.5)
SODIUM BLD-SCNC: 141 MMOL/L (ref 136–145)
TRIGL SERPL-MCNC: 92 MG/DL (ref 0–150)
VLDLC SERPL-MCNC: 18.4 MG/DL (ref 5–40)

## 2018-08-03 PROCEDURE — 80061 LIPID PANEL: CPT | Performed by: INTERNAL MEDICINE

## 2018-08-03 PROCEDURE — 80053 COMPREHEN METABOLIC PANEL: CPT | Performed by: INTERNAL MEDICINE

## 2018-08-03 PROCEDURE — 83036 HEMOGLOBIN GLYCOSYLATED A1C: CPT | Performed by: INTERNAL MEDICINE

## 2018-08-03 PROCEDURE — 36415 COLL VENOUS BLD VENIPUNCTURE: CPT | Performed by: INTERNAL MEDICINE

## 2018-08-10 ENCOUNTER — OFFICE VISIT (OUTPATIENT)
Dept: INTERNAL MEDICINE | Facility: CLINIC | Age: 68
End: 2018-08-10

## 2018-08-10 VITALS
HEIGHT: 64 IN | SYSTOLIC BLOOD PRESSURE: 110 MMHG | RESPIRATION RATE: 14 BRPM | WEIGHT: 151 LBS | DIASTOLIC BLOOD PRESSURE: 64 MMHG | BODY MASS INDEX: 25.78 KG/M2

## 2018-08-10 DIAGNOSIS — E78.00 PURE HYPERCHOLESTEROLEMIA: Primary | ICD-10-CM

## 2018-08-10 DIAGNOSIS — R73.01 IMPAIRED FASTING GLUCOSE: ICD-10-CM

## 2018-08-10 DIAGNOSIS — I10 BENIGN ESSENTIAL HYPERTENSION: ICD-10-CM

## 2018-08-10 DIAGNOSIS — Z12.31 VISIT FOR SCREENING MAMMOGRAM: ICD-10-CM

## 2018-08-10 PROBLEM — J01.00 SINUSITIS, ACUTE, MAXILLARY: Status: RESOLVED | Noted: 2018-02-23 | Resolved: 2018-08-10

## 2018-08-10 PROBLEM — R91.1 LUNG NODULE SEEN ON IMAGING STUDY: Status: RESOLVED | Noted: 2017-02-06 | Resolved: 2018-08-10

## 2018-08-10 PROBLEM — M72.2 PLANTAR FASCIITIS: Status: RESOLVED | Noted: 2017-02-06 | Resolved: 2018-08-10

## 2018-08-10 PROBLEM — R06.09 EXERTIONAL DYSPNEA: Status: RESOLVED | Noted: 2018-02-09 | Resolved: 2018-08-10

## 2018-08-10 PROCEDURE — 99214 OFFICE O/P EST MOD 30 MIN: CPT | Performed by: INTERNAL MEDICINE

## 2018-08-10 NOTE — PROGRESS NOTES
"Subjective   Colleen Lovell is a 67 y.o. female.     History of Present Illness     /64 (BP Location: Left arm, Patient Position: Sitting, Cuff Size: Adult)   Resp 14   Ht 162.6 cm (64\")   Wt 68.5 kg (151 lb)   BMI 25.92 kg/m²     Current Outpatient Prescriptions:   •  ALLERGY SERUM INJECTION, Inject 1 mL under the skin Every 30 (Thirty) Days., Disp: , Rfl:   •  aspirin 81 MG EC tablet, Take  by mouth., Disp: , Rfl:   •  azelastine (ASTELIN) 0.1 % nasal spray, , Disp: , Rfl:   •  Calcium 250 MG capsule, Take  by mouth., Disp: , Rfl:   •  Cetirizine HCl 10 MG capsule, Take  by mouth., Disp: , Rfl:   •  Cholecalciferol (VITAMIN D3) 5000 UNITS capsule capsule, Take 1,000 Units by mouth daily., Disp: , Rfl:   •  Cranberry (HM CRANBERRY SUPER STRENGTH) 300 MG tablet, Take 300 mg by mouth Daily., Disp: , Rfl:   •  isosorbide mononitrate (IMDUR) 30 MG 24 hr tablet, TAKE 1 TABLET EVERY DAY, Disp: 90 tablet, Rfl: 1  •  Lifitegrast (XIIDRA) 5 % solution, Apply  to eye., Disp: , Rfl:   •  lisinopril (PRINIVIL,ZESTRIL) 2.5 MG tablet, Take 1 tablet by mouth Daily., Disp: 90 tablet, Rfl: 3  •  Magnesium Oxide 400 MG capsule, Take  by mouth., Disp: , Rfl:   •  metoprolol tartrate (LOPRESSOR) 25 MG tablet, Take 1 tablet by mouth 2 (Two) Times a Day. Patient's dose has been increased, Disp: 180 tablet, Rfl: 3  •  Multiple Vitamin (MULTI VITAMIN DAILY PO), Take  by mouth., Disp: , Rfl:   •  Omega-3 Fatty Acids (FISH OIL) 1200 MG capsule capsule, Take  by mouth., Disp: , Rfl:   •  PROAIR  (90 Base) MCG/ACT inhaler, , Disp: , Rfl:   •  ranitidine (ZANTAC) 150 MG capsule, Take 1 capsule by mouth 2 (two) times a day., Disp: 60 capsule, Rfl: 11  •  rosuvastatin (CRESTOR) 10 MG tablet, Take 1 tablet by mouth Daily., Disp: 90 tablet, Rfl: 3  •  traZODone (DESYREL) 100 MG tablet, Take 1 tablet by mouth Every Night., Disp: 90 tablet, Rfl: 3  •  valACYclovir (VALTREX) 1000 MG tablet, Take  by mouth every 12 (twelve) hours., " "Disp: , Rfl:     Patient has long-standing benign essential HTN.  BP is usually well controlled with daily use of b-blocker, ACEI and vasodilator. On low salt diet with good compliance. Takes medication regularly. Denies chest pain, dyspnea,lightheadedness,  lower extremity edema. Patient does not check blood pressure    Patient has been diagnosed with hyperlipidemia. Target LDL is < 70 mg/dl (\"very high\" risk for CHD). Patient is on low fat diet with good compliance. Patient is compliant with treatment: daily use of Crestor (rozuvastatin). Side effects of medication: none. Exercise 5-6 days a week and walking.    Patient had been noticed to have elevated fasting blood sugars. Does not check her BS at home. Good compliance with low carb diet. Had gained 5 lbs in the last 5 months.    The following portions of the patient's history were reviewed and updated as appropriate: allergies, current medications, past family history, past medical history, past social history, past surgical history and problem list.    Review of Systems   Constitutional: Negative for chills and fever.   Eyes: Negative for pain and redness.   Respiratory: Negative for cough and shortness of breath.    Cardiovascular: Positive for leg swelling. Negative for chest pain.   Neurological: Negative for dizziness and headaches.       Objective   Physical Exam   Constitutional: She is oriented to person, place, and time. She appears well-developed and well-nourished.   HENT:   Head: Normocephalic and atraumatic.   Right Ear: Tympanic membrane, external ear and ear canal normal.   Left Ear: Tympanic membrane, external ear and ear canal normal.   Nose: Nose normal. Right sinus exhibits no maxillary sinus tenderness and no frontal sinus tenderness. Left sinus exhibits no maxillary sinus tenderness and no frontal sinus tenderness.   Mouth/Throat: Uvula is midline, oropharynx is clear and moist and mucous membranes are normal.   Eyes: Pupils are equal, " "round, and reactive to light. Conjunctivae and EOM are normal. Right eye exhibits no discharge. Left eye exhibits no discharge. No scleral icterus.   Neck: Neck supple. No JVD present.   Cardiovascular: Normal rate, regular rhythm and normal heart sounds.  Exam reveals no gallop and no friction rub.    No murmur heard.  Pulmonary/Chest: Effort normal and breath sounds normal. She has no wheezes. She has no rales.   Musculoskeletal: She exhibits no edema.   Lymphadenopathy:     She has no cervical adenopathy.   Neurological: She is alert and oriented to person, place, and time. No cranial nerve deficit.   Skin: Skin is warm and dry. No rash noted.   Psychiatric: She has a normal mood and affect. Her behavior is normal.   Vitals reviewed.      Assessment/Plan   Colleen was seen today for hyperlipidemia and hypertension.    Diagnoses and all orders for this visit:    Pure hypercholesterolemia  -     CBC & Differential; Future  -     Comprehensive Metabolic Panel; Future  -     Lipid Panel; Future  -     TSH; Future    Benign essential hypertension  -     Comprehensive Metabolic Panel; Future  -     TSH; Future  -     Urinalysis With Microscopic If Indicated (No Culture) - Urine, Clean Catch; Future    Impaired fasting glucose  -     Hemoglobin A1c; Future      HTN - well controlled with current medication regimen. Normal kidney tests and electrolytes. Recommended low salt diet. Continue same medical treatment.  Hyperlipidemia - well controlled with statin. Normal liver function tests. LDL target is below < 70 mg/dl (\"very high\" risk for CHD). Patient's 64. Continue same treatment. Regular exercise recommended.  Impaired fasting blood sugars - resolved. Low carb diet and regular exercise recommended.            "

## 2018-08-21 ENCOUNTER — APPOINTMENT (OUTPATIENT)
Dept: WOMENS IMAGING | Facility: HOSPITAL | Age: 68
End: 2018-08-21

## 2018-08-21 PROCEDURE — 77063 BREAST TOMOSYNTHESIS BI: CPT | Performed by: RADIOLOGY

## 2018-08-21 PROCEDURE — 77067 SCR MAMMO BI INCL CAD: CPT | Performed by: RADIOLOGY

## 2018-12-19 DIAGNOSIS — I25.810 CORONARY ARTERY DISEASE INVOLVING AUTOLOGOUS ARTERY CORONARY BYPASS GRAFT WITHOUT ANGINA PECTORIS: ICD-10-CM

## 2018-12-20 RX ORDER — ISOSORBIDE MONONITRATE 30 MG/1
TABLET, EXTENDED RELEASE ORAL
Qty: 90 TABLET | Refills: 1 | Status: SHIPPED | OUTPATIENT
Start: 2018-12-20 | End: 2019-08-07 | Stop reason: SDUPTHER

## 2019-02-08 ENCOUNTER — LAB (OUTPATIENT)
Dept: INTERNAL MEDICINE | Facility: CLINIC | Age: 69
End: 2019-02-08

## 2019-02-08 DIAGNOSIS — R73.01 IMPAIRED FASTING GLUCOSE: ICD-10-CM

## 2019-02-08 DIAGNOSIS — E78.00 PURE HYPERCHOLESTEROLEMIA: ICD-10-CM

## 2019-02-08 DIAGNOSIS — I10 BENIGN ESSENTIAL HYPERTENSION: ICD-10-CM

## 2019-02-08 LAB
ALBUMIN SERPL-MCNC: 4.4 G/DL (ref 3.5–5.2)
ALBUMIN/GLOB SERPL: 1.4 G/DL
ALP SERPL-CCNC: 88 U/L (ref 39–117)
ALT SERPL W P-5'-P-CCNC: 19 U/L (ref 1–33)
ANION GAP SERPL CALCULATED.3IONS-SCNC: 11.7 MMOL/L
AST SERPL-CCNC: 21 U/L (ref 1–32)
BASOPHILS # BLD AUTO: 0.04 10*3/MM3 (ref 0–0.2)
BASOPHILS NFR BLD AUTO: 0.7 % (ref 0–2)
BILIRUB SERPL-MCNC: 1.1 MG/DL (ref 0.1–1.2)
BILIRUB UR QL STRIP: NEGATIVE
BUN BLD-MCNC: 11 MG/DL (ref 8–23)
BUN/CREAT SERPL: 13.4 (ref 7–25)
CALCIUM SPEC-SCNC: 9.6 MG/DL (ref 8.6–10.5)
CHLORIDE SERPL-SCNC: 102 MMOL/L (ref 98–107)
CHOLEST SERPL-MCNC: 145 MG/DL (ref 0–200)
CLARITY UR: CLEAR
CO2 SERPL-SCNC: 26.3 MMOL/L (ref 22–29)
COLOR UR: YELLOW
CREAT BLD-MCNC: 0.82 MG/DL (ref 0.57–1)
DEPRECATED RDW RBC AUTO: 39.4 FL (ref 37–54)
EOSINOPHIL # BLD AUTO: 0.2 10*3/MM3 (ref 0–0.7)
EOSINOPHIL NFR BLD AUTO: 3.3 % (ref 0–5)
ERYTHROCYTE [DISTWIDTH] IN BLOOD BY AUTOMATED COUNT: 12.3 % (ref 11.5–15)
GFR SERPL CREATININE-BSD FRML MDRD: 70 ML/MIN/1.73
GLOBULIN UR ELPH-MCNC: 3.1 GM/DL
GLUCOSE BLD-MCNC: 106 MG/DL (ref 65–99)
GLUCOSE UR STRIP-MCNC: NEGATIVE MG/DL
HBA1C MFR BLD: 6 % (ref 4.8–5.6)
HCT VFR BLD AUTO: 41.1 % (ref 34.1–44.9)
HDLC SERPL-MCNC: 47 MG/DL (ref 40–60)
HGB BLD-MCNC: 13.7 G/DL (ref 11.2–15.7)
HGB UR QL STRIP.AUTO: NEGATIVE
KETONES UR QL STRIP: NEGATIVE
LDLC SERPL CALC-MCNC: 78 MG/DL (ref 0–100)
LDLC/HDLC SERPL: 1.66 {RATIO}
LEUKOCYTE ESTERASE UR QL STRIP.AUTO: NEGATIVE
LYMPHOCYTES # BLD AUTO: 1.93 10*3/MM3 (ref 0.8–7)
LYMPHOCYTES NFR BLD AUTO: 31.8 % (ref 10–60)
MCH RBC QN AUTO: 29.9 PG (ref 26–34)
MCHC RBC AUTO-ENTMCNC: 33.3 G/DL (ref 31–37)
MCV RBC AUTO: 89.7 FL (ref 80–100)
MONOCYTES # BLD AUTO: 0.39 10*3/MM3 (ref 0–1)
MONOCYTES NFR BLD AUTO: 6.4 % (ref 0–13)
NEUTROPHILS # BLD AUTO: 3.5 10*3/MM3 (ref 1–11)
NEUTROPHILS NFR BLD AUTO: 57.8 % (ref 30–85)
NITRITE UR QL STRIP: NEGATIVE
PH UR STRIP.AUTO: 7.5 [PH] (ref 5–8)
PLATELET # BLD AUTO: 311 10*3/MM3 (ref 150–450)
PMV BLD AUTO: 10.1 FL (ref 6–12)
POTASSIUM BLD-SCNC: 4.4 MMOL/L (ref 3.5–5.2)
PROT SERPL-MCNC: 7.5 G/DL (ref 6–8.5)
PROT UR QL STRIP: NEGATIVE
RBC # BLD AUTO: 4.58 10*6/MM3 (ref 3.93–5.22)
SODIUM BLD-SCNC: 140 MMOL/L (ref 136–145)
SP GR UR STRIP: 1.02 (ref 1–1.03)
TRIGL SERPL-MCNC: 100 MG/DL (ref 0–150)
TSH SERPL DL<=0.05 MIU/L-ACNC: 2.91 MIU/ML (ref 0.27–4.2)
UROBILINOGEN UR QL STRIP: NORMAL
VLDLC SERPL-MCNC: 20 MG/DL (ref 5–40)
WBC NRBC COR # BLD: 6.06 10*3/MM3 (ref 5–10)

## 2019-02-08 PROCEDURE — 83036 HEMOGLOBIN GLYCOSYLATED A1C: CPT | Performed by: INTERNAL MEDICINE

## 2019-02-08 PROCEDURE — 80061 LIPID PANEL: CPT | Performed by: INTERNAL MEDICINE

## 2019-02-08 PROCEDURE — 85025 COMPLETE CBC W/AUTO DIFF WBC: CPT | Performed by: INTERNAL MEDICINE

## 2019-02-08 PROCEDURE — 36415 COLL VENOUS BLD VENIPUNCTURE: CPT | Performed by: INTERNAL MEDICINE

## 2019-02-08 PROCEDURE — 81003 URINALYSIS AUTO W/O SCOPE: CPT | Performed by: INTERNAL MEDICINE

## 2019-02-08 PROCEDURE — 84443 ASSAY THYROID STIM HORMONE: CPT | Performed by: INTERNAL MEDICINE

## 2019-02-08 PROCEDURE — 80053 COMPREHEN METABOLIC PANEL: CPT | Performed by: INTERNAL MEDICINE

## 2019-02-15 ENCOUNTER — OFFICE VISIT (OUTPATIENT)
Dept: INTERNAL MEDICINE | Facility: CLINIC | Age: 69
End: 2019-02-15

## 2019-02-15 VITALS
DIASTOLIC BLOOD PRESSURE: 64 MMHG | WEIGHT: 145 LBS | HEIGHT: 64 IN | RESPIRATION RATE: 14 BRPM | BODY MASS INDEX: 24.75 KG/M2 | SYSTOLIC BLOOD PRESSURE: 102 MMHG

## 2019-02-15 DIAGNOSIS — I10 BENIGN ESSENTIAL HYPERTENSION: ICD-10-CM

## 2019-02-15 DIAGNOSIS — Z00.00 HEALTH CARE MAINTENANCE: Primary | ICD-10-CM

## 2019-02-15 DIAGNOSIS — F51.01 PRIMARY INSOMNIA: ICD-10-CM

## 2019-02-15 DIAGNOSIS — R73.01 IMPAIRED FASTING GLUCOSE: ICD-10-CM

## 2019-02-15 DIAGNOSIS — E78.00 PURE HYPERCHOLESTEROLEMIA: ICD-10-CM

## 2019-02-15 DIAGNOSIS — I25.810 CORONARY ARTERY DISEASE INVOLVING AUTOLOGOUS ARTERY CORONARY BYPASS GRAFT WITHOUT ANGINA PECTORIS: ICD-10-CM

## 2019-02-15 PROCEDURE — 99214 OFFICE O/P EST MOD 30 MIN: CPT | Performed by: INTERNAL MEDICINE

## 2019-02-15 PROCEDURE — G0439 PPPS, SUBSEQ VISIT: HCPCS | Performed by: INTERNAL MEDICINE

## 2019-02-15 NOTE — PROGRESS NOTES
"Onelia Lovell is a 68 y.o. female.     History of Present Illness   /64 (BP Location: Left arm, Patient Position: Sitting, Cuff Size: Adult)   Resp 14   Ht 162.6 cm (64\")   Wt 68.5 kg (151 lb)   BMI 25.92 kg/m²   Patient is being seen for subsequent wellness visit.  Hospitalizations in a past: 3  Once per lifetime Medicare screening tests: ECG - under the care of cardiologist    AAA risk assessment: 1. FH: none. 2.H/o tobacco smoking: none. 3. CV or PAD: as per medical problems list.    Tobacco use: none   Alcohol use: none  Illicit drugs use: none  Diet: regular, drinks regular Pepsy once a day. Occasionally has fast food.  Exercise: used to exercise regularly, but had not been lately due to poo weather    Anxiety screening: How many days in the last 2 weeks you were  1. Feeling anxious, nervous, on edge: none  2. Unable to stop worrying: none  3. Worrying too much about different things: none  4. Having problems relaxing:none  5. Feeling restless or unable to sit still:none  6. Feeling irritable or easely annoyed: none  7. Being afraid that something awful might happen: none    Depression screening PHQ9:  Over the past 2 weeks how often have you been bothered by  1. Lack of interest or pleasuer in usual activities: none  2. Feeling down, depressed, hopeless:none  3. Troubles falling asleep/sleeping too long: well controlled with Trazodone  4. Feeling tired, having little energy: none  5. Poor appetite or overeating: none  6. Feeling bad about yourself:none.  7. Trouble concentrating: at the baseline.  8. Moving or speaking too slow or much faster than usual: none  9. Thoughts about harming yourself:none.    Cognitive impairment screening:   Do you have difficulties with:  1. Language: no  2. Behavior: no  3. Learning/retaining new information: no  4. Handling complex tasks: no  5. Reasoning: no  6. Spacial ability and orientation: no    Hearing: normal.  Driving: unrestricted  ADL: " "independent  ADL details: Does patient needs help with:  telephone use: no  Transportation: no  Housework: no  Shopping: no  meal preparation: no  laundry: no  managing medication:no  Participate in the social activities: Y    Fall risk factors:   1.Use of alcohol: no    2.Polypharmacy: yes  3.H/o of previous fall:  no  4. Mobility impairment:  no  5. Visual impairment:  no  6. Deconditioning: yes  7. Use of antihypertensive medication:  yes  8. Use of sedatives: no  9. Use of antidepressant: no    Home safety:   1.loose rugs: no   2.unfamiliar environment: no   3. Uneven floors: no   4. No grab bars in the bathroom: no  5. No banister on stairs: no      Advanced directives: has Living Will. Discussed. I agree with patient wishes and decision..    Co-managers: ophthalmology  [facility], dermatology , cardiology Dr.Janet Ocampo, gastroenterologist ,       /64 (BP Location: Left arm, Patient Position: Sitting, Cuff Size: Adult)   Resp 14   Ht 162.6 cm (64\")   Wt 68.5 kg (151 lb)   BMI 25.92 kg/m²     Current Outpatient Medications:   •  ALLERGY SERUM INJECTION, Inject 1 mL under the skin Every 30 (Thirty) Days., Disp: , Rfl:   •  aspirin 81 MG EC tablet, Take  by mouth., Disp: , Rfl:   •  azelastine (ASTELIN) 0.1 % nasal spray, , Disp: , Rfl:   •  Calcium 250 MG capsule, Take  by mouth., Disp: , Rfl:   •  Cetirizine HCl 10 MG capsule, Take  by mouth., Disp: , Rfl:   •  Cholecalciferol (VITAMIN D3) 5000 UNITS capsule capsule, Take 1,000 Units by mouth daily., Disp: , Rfl:   •  Cranberry (HM CRANBERRY SUPER STRENGTH) 300 MG tablet, Take 300 mg by mouth Daily., Disp: , Rfl:   •  isosorbide mononitrate (IMDUR) 30 MG 24 hr tablet, TAKE 1 TABLET EVERY DAY, Disp: 90 tablet, Rfl: 1  •  Lifitegrast (XIIDRA) 5 % solution, Apply  to eye., Disp: , Rfl:   •  lisinopril (PRINIVIL,ZESTRIL) 2.5 MG tablet, Take 1 tablet by mouth Daily., Disp: 90 tablet, Rfl: 3  •  Magnesium Oxide 400 MG " "capsule, Take  by mouth., Disp: , Rfl:   •  metoprolol tartrate (LOPRESSOR) 25 MG tablet, Take 1 tablet by mouth 2 (Two) Times a Day. Patient's dose has been increased, Disp: 180 tablet, Rfl: 3  •  Multiple Vitamin (MULTI VITAMIN DAILY PO), Take  by mouth., Disp: , Rfl:   •  Omega-3 Fatty Acids (FISH OIL) 1200 MG capsule capsule, Take  by mouth., Disp: , Rfl:   •  PROAIR  (90 Base) MCG/ACT inhaler, , Disp: , Rfl:   •  ranitidine (ZANTAC) 150 MG capsule, Take 1 capsule by mouth 2 (two) times a day., Disp: 60 capsule, Rfl: 11  •  rosuvastatin (CRESTOR) 10 MG tablet, Take 1 tablet by mouth Daily., Disp: 90 tablet, Rfl: 3  •  traZODone (DESYREL) 100 MG tablet, Take 1 tablet by mouth Every Night., Disp: 90 tablet, Rfl: 3  •  valACYclovir (VALTREX) 1000 MG tablet, Take  by mouth every 12 (twelve) hours., Disp: , Rfl:     Patient has long-standing benign essential HTN.  BP is usually well controlled with daily use of b-blocker, ACEI and nitrate. On low salt diet with fair compliance. Takes medication regularly. Denies chest pain, dyspnea,lightheadedness,  lower extremity edema. Patient does not check blood pressure    Patient has been diagnosed with hyperlipidemia. Target LDL is < 70 mg/dl (\"very high\" risk for CHD). Patient is on low fat diet with fair compliance. Patient is compliant with treatment: daily use of Crestor (rozuvastatin). Side effects of medication: none. Exercise used to exercise regularly, but had not been lately due to poo weather.    Patient had been noticed to have elevated fasting blood sugars. Colleen Lovell uses diet and exercise for blood sugars control. Patient does not check home blood sugars.. Compliance with low carb diabetic diet is poor. She usually walks 1 hour 3 days a week, but had not been able to do so due to the poor weather since fall.                                    The following portions of the patient's history were reviewed and updated as appropriate: allergies, " current medications, past family history, past medical history, past social history, past surgical history and problem list.    Review of Systems   Constitutional: Negative for chills and fever.   HENT: Negative for postnasal drip, sinus pressure and sore throat.    Eyes: Negative for pain and itching.   Respiratory: Negative for cough and chest tightness.    Cardiovascular: Positive for leg swelling. Negative for chest pain.   Gastrointestinal: Negative for abdominal pain and blood in stool.   Endocrine: Negative for cold intolerance and heat intolerance.   Genitourinary: Negative for difficulty urinating and flank pain.   Musculoskeletal: Negative for back pain and neck pain.   Skin: Negative for color change and rash.   Neurological: Negative for dizziness, weakness and headaches.   Hematological: Negative for adenopathy. Does not bruise/bleed easily.   Psychiatric/Behavioral: Negative for sleep disturbance. The patient is not nervous/anxious.        Objective   Physical Exam   Constitutional: She is oriented to person, place, and time. Vital signs are normal. She appears well-developed and well-nourished. No distress.   HENT:   Head: Normocephalic and atraumatic.   Right Ear: External ear normal.   Left Ear: External ear normal.   Nose: Nose normal. No mucosal edema. Right sinus exhibits no maxillary sinus tenderness and no frontal sinus tenderness. Left sinus exhibits no maxillary sinus tenderness and no frontal sinus tenderness.   Mouth/Throat: Oropharynx is clear and moist. No oropharyngeal exudate.   Eyes: Conjunctivae, EOM and lids are normal. Pupils are equal, round, and reactive to light. Right eye exhibits no discharge. Left eye exhibits no discharge. Right conjunctiva is not injected. Left conjunctiva is not injected. No scleral icterus. Right eye exhibits normal extraocular motion. Left eye exhibits normal extraocular motion.   Neck: Normal range of motion and full passive range of motion without pain.  Neck supple. No JVD present. Carotid bruit is not present. No thyromegaly present.   Cardiovascular: Normal rate, regular rhythm, S1 normal, S2 normal, normal heart sounds and intact distal pulses. PMI is not displaced. Exam reveals no gallop and no friction rub.   No murmur heard.  Pulmonary/Chest: Effort normal and breath sounds normal. No accessory muscle usage. No respiratory distress. She has no decreased breath sounds. She has no wheezes. She has no rhonchi. She has no rales.   Abdominal: Soft. Bowel sounds are normal. She exhibits no distension, no pulsatile liver, no fluid wave, no abdominal bruit, no ascites and no mass. There is no tenderness. There is no rebound and no guarding.   Musculoskeletal: She exhibits no edema or deformity.   Lymphadenopathy:        Head (right side): No submental, no submandibular, no preauricular, no posterior auricular and no occipital adenopathy present.        Head (left side): No submental, no submandibular, no tonsillar, no preauricular, no posterior auricular and no occipital adenopathy present.     She has no cervical adenopathy.        Right cervical: No superficial cervical, no deep cervical and no posterior cervical adenopathy present.       Left cervical: No superficial cervical, no deep cervical and no posterior cervical adenopathy present.        Right: No supraclavicular adenopathy present.        Left: No supraclavicular adenopathy present.   Neurological: She is alert and oriented to person, place, and time. She has normal strength and normal reflexes. She displays normal reflexes. No cranial nerve deficit. She exhibits normal muscle tone. Coordination normal.   Reflex Scores:       Bicep reflexes are 2+ on the right side and 2+ on the left side.       Patellar reflexes are 2+ on the right side and 2+ on the left side.  Skin: Skin is warm and dry. No rash noted. She is not diaphoretic. No erythema.   Psychiatric: She has a normal mood and affect. Her speech is  normal and behavior is normal. Thought content normal.   Vitals reviewed.      Assessment/Plan   Colleen was seen today for medicare wellness-subsequent, hypertension, hyperlipidemia and coronary artery disease.    Diagnoses and all orders for this visit:    Health care maintenance    Benign essential hypertension    Pure hypercholesterolemia  -     Comprehensive Metabolic Panel; Future  -     Lipid Panel; Future    Impaired fasting glucose  -     Hemoglobin A1c; Future    Coronary artery disease involving autologous artery coronary bypass graft without angina pectoris    Primary insomnia        Annual wellness visit with preventive exam as well as age and risk appropriate councelling completed.    Cardiovascular disease councelling: current. Recommended: Continue statin and ASA., Needs better cholesterol control. Target LDL below 70.  Diabetes screening and councelling: current. Recommended: Low carb diet recommended. Needs to avoid starches and sweets., Regular aerobic exercise recommended.  Breast cancer screening: up to date. Recommended every year..  Osteoporosis screening: up to date. Recommended frequency every 2 years. Next DEXA is due in 2020..  Glaucoma screening: up to date.   AAA screening: not needed..  Hep C screening (born between [birth year]-5560) completed..  Colorectal cancer screening: up to date. Recommended every 5 years. Next screening is recommended in 2020..    Immunizations:   1. Influenza vaccine  is up to date and recommended yearly.   2. Pneumococcal vaccines: completed.   3. Shingles prevention:  Zostavax completed, recommended to get new shingles vaccine Shindrix at [facility], benefits explained.      Advanced directives planning: completed and Living Will is on file in the hospital. Discussed. I agree with patients decision..    Medications reviewed and medication list updated.   Potential harmful drug-disease interactions in the elderly:statin and impaired fasting blood sugar.  High risk  "medication in elderly: none  ASA use: continue daily ASA 81 mg.    HTN - well controlled with current medication regimen. Normal kidney tests and electrolytes. Recommended low salt diet. Continue same medical treatment.  Hyperlipidemia - not well contrlled with statin. Normal liver function tests. LDL target is below < 70 mg/dl (\"very high\" risk for CHD). Patient's 79, which is an increase from what it used to be last year - needs better diet and exercise. Continue same treatment. Regular exercise recommended.  Impaired fasting blood sugar and high risk of diabetes: Hb A1C is   Lab Results   Component Value Date    HGBA1C 6.00 (H) 02/08/2019    Low carb diet and regular exercise recommended.   CAD - under the care of cardiologist. Asymptomatic. Needs better cholesterol and blood sugars control. Continue ASA, statin and b-blocker.  Insomnia - well controlled with Trazodone, continue same.  Osteopenia - will plan to recheck DEXA next year. Needs regular weight bearing exercise: restart walking.Continue taking Vit D supplements.     "

## 2019-02-15 NOTE — PATIENT INSTRUCTIONS
"Annual wellness visit with preventive exam as well as age and risk appropriate councelling completed.    Cardiovascular disease councelling: current. Recommended: Continue statin and ASA., Needs better cholesterol control. Target LDL below 70.  Diabetes screening and councelling: current. Recommended: Low carb diet recommended. Needs to avoid starches and sweets., Regular aerobic exercise recommended.  Breast cancer screening: up to date. Recommended every year..  Osteoporosis screening: up to date. Recommended frequency every 2 years. Next DEXA is due in 2020..  Glaucoma screening: up to date.   AAA screening: not needed..  Hep C screening (born between 4046-0459) completed..  Colorectal cancer screening: up to date. Recommended every 5 years. Next screening is recommended in 2020..    Immunizations:   1. Influenza vaccine  is up to date and recommended yearly.   2. Pneumococcal vaccines: completed.   3. Shingles prevention:  Zostavax completed, recommended to get new shingles vaccine Shindrix at [facility], benefits explained.      Advanced directives planning: completed and Living Will is on file in the hospital. Discussed. I agree with patients decision..    Medications reviewed and medication list updated.   Potential harmful drug-disease interactions in the elderly:statin and impaired fasting blood sugar.  High risk medication in elderly: none  ASA use: continue daily ASA 81 mg.    HTN - well controlled with current medication regimen. Normal kidney tests and electrolytes. Recommended low salt diet. Continue same medical treatment.  Hyperlipidemia - not well contrlled with statin. Normal liver function tests. LDL target is below < 70 mg/dl (\"very high\" risk for CHD). Patient's 79, which is an increase from what it used to be last year - needs better diet and exercise. Continue same treatment. Regular exercise recommended.  Impaired fasting blood sugar and high risk of diabetes: Hb A1C is   Lab Results "   Component Value Date    HGBA1C 6.00 (H) 02/08/2019    Low carb diet and regular exercise recommended.   CAD - under the care of cardiologist. Asymptomatic. Needs better cholesterol and blood sugars control. Continue ASA, statin and b-blocker.  Insomnia - well controlled with Trazodone, continue same.  Osteopenia - will plan to recheck DEXA next year. Needs regular weight bearing exercise: restart walking.Continue taking Vit D supplements.

## 2019-05-06 DIAGNOSIS — F51.01 PRIMARY INSOMNIA: ICD-10-CM

## 2019-05-06 DIAGNOSIS — E78.00 PURE HYPERCHOLESTEROLEMIA: ICD-10-CM

## 2019-05-06 DIAGNOSIS — I10 BENIGN ESSENTIAL HYPERTENSION: ICD-10-CM

## 2019-05-06 DIAGNOSIS — I25.810 CORONARY ARTERY DISEASE INVOLVING AUTOLOGOUS ARTERY CORONARY BYPASS GRAFT WITHOUT ANGINA PECTORIS: ICD-10-CM

## 2019-05-06 RX ORDER — TRAZODONE HYDROCHLORIDE 100 MG/1
TABLET ORAL
Qty: 90 TABLET | Refills: 3 | Status: SHIPPED | OUTPATIENT
Start: 2019-05-06 | End: 2020-03-02

## 2019-05-06 RX ORDER — ROSUVASTATIN CALCIUM 10 MG/1
TABLET, COATED ORAL
Qty: 90 TABLET | Refills: 3 | Status: SHIPPED | OUTPATIENT
Start: 2019-05-06 | End: 2020-03-02

## 2019-05-06 RX ORDER — LISINOPRIL 2.5 MG/1
TABLET ORAL
Qty: 90 TABLET | Refills: 3 | Status: SHIPPED | OUTPATIENT
Start: 2019-05-06 | End: 2020-03-02

## 2019-07-20 ENCOUNTER — HOSPITAL ENCOUNTER (EMERGENCY)
Facility: HOSPITAL | Age: 69
Discharge: HOME | End: 2019-07-20
Attending: EMERGENCY MEDICINE | Admitting: EMERGENCY MEDICINE

## 2019-07-20 VITALS
HEART RATE: 78 BPM | WEIGHT: 145 LBS | OXYGEN SATURATION: 95 % | BODY MASS INDEX: 24.75 KG/M2 | SYSTOLIC BLOOD PRESSURE: 106 MMHG | TEMPERATURE: 98 F | HEIGHT: 64 IN | RESPIRATION RATE: 16 BRPM | DIASTOLIC BLOOD PRESSURE: 69 MMHG

## 2019-07-20 DIAGNOSIS — T15.92XA FOREIGN BODY IN EYE, LEFT, INITIAL ENCOUNTER: Primary | ICD-10-CM

## 2019-07-20 PROCEDURE — 99283 EMERGENCY DEPT VISIT LOW MDM: CPT

## 2019-07-20 PROCEDURE — 99282 EMERGENCY DEPT VISIT SF MDM: CPT | Performed by: PHYSICIAN ASSISTANT

## 2019-07-20 RX ORDER — PROPARACAINE HYDROCHLORIDE 5 MG/ML
SOLUTION/ DROPS OPHTHALMIC
Status: COMPLETED
Start: 2019-07-20 | End: 2019-07-20

## 2019-07-20 RX ADMIN — FLUORESCEIN SODIUM 1 STRIP: 1 STRIP OPHTHALMIC at 11:14

## 2019-07-20 RX ADMIN — PROPARACAINE HYDROCHLORIDE 2 DROP: 5 SOLUTION/ DROPS OPHTHALMIC at 11:15

## 2019-07-22 ENCOUNTER — OFFICE VISIT (OUTPATIENT)
Dept: INTERNAL MEDICINE | Facility: CLINIC | Age: 69
End: 2019-07-22

## 2019-07-22 VITALS
BODY MASS INDEX: 24.89 KG/M2 | DIASTOLIC BLOOD PRESSURE: 76 MMHG | WEIGHT: 145 LBS | TEMPERATURE: 97.8 F | SYSTOLIC BLOOD PRESSURE: 118 MMHG

## 2019-07-22 DIAGNOSIS — H10.89 OTHER CONJUNCTIVITIS OF LEFT EYE: Primary | ICD-10-CM

## 2019-07-22 PROCEDURE — 99213 OFFICE O/P EST LOW 20 MIN: CPT | Performed by: NURSE PRACTITIONER

## 2019-07-22 RX ORDER — POLYMYXIN B SULFATE AND TRIMETHOPRIM 1; 10000 MG/ML; [USP'U]/ML
1 SOLUTION OPHTHALMIC EVERY 4 HOURS
Qty: 10 ML | Refills: 0 | Status: SHIPPED | OUTPATIENT
Start: 2019-07-22 | End: 2019-07-24 | Stop reason: ALTCHOICE

## 2019-07-22 NOTE — PROGRESS NOTES
Subjective   Colleen Lovell is a 68 y.o. female.     She woke up with L eye pain 3 days ago. She went to ER to be evaluated. No foreign body found, eye flush performed. She was offered abx but refused at the time but her drainage, swelling, and pain are gradually worsening.      Eye Problem    The left eye is affected. This is a new problem. The current episode started in the past 7 days. The problem occurs constantly. The problem has been gradually worsening. There was no injury mechanism. The pain is mild. Associated symptoms include an eye discharge, eye redness and itching. Pertinent negatives include no blurred vision, double vision, fever, foreign body sensation, nausea, photophobia or vomiting.        The following portions of the patient's history were reviewed and updated as appropriate: allergies, current medications, past family history, past medical history, past social history, past surgical history and problem list.    Review of Systems   Constitutional: Negative for fever.   Eyes: Positive for pain, discharge, redness and itching. Negative for blurred vision, double vision, photophobia and visual disturbance.   Gastrointestinal: Negative for nausea and vomiting.       Objective   Physical Exam   Constitutional: She appears well-developed and well-nourished.   HENT:   Head: Normocephalic and atraumatic.   Eyes: EOM are normal. Pupils are equal, round, and reactive to light. Left conjunctiva is injected. Left conjunctiva has no hemorrhage.   Mild L lower lid edema.   Cardiovascular: Normal rate, regular rhythm and normal heart sounds.   No murmur heard.  Pulmonary/Chest: Effort normal and breath sounds normal. No respiratory distress.   Musculoskeletal: Normal range of motion.   Neurological: She is alert.   Skin: Skin is warm and dry.   Psychiatric: She has a normal mood and affect. Her behavior is normal. Judgment and thought content normal.   Vitals reviewed.      Assessment/Plan   Colleen was seen  today for eye problem.    Diagnoses and all orders for this visit:    Other conjunctivitis of left eye  -     trimethoprim-polymyxin b (POLYTRIM) 82457-3.1 UNIT/ML-% ophthalmic solution; Administer 1 drop into the left eye Every 4 (Four) Hours.    Return for worsening of sx.

## 2019-07-24 ENCOUNTER — TELEPHONE (OUTPATIENT)
Dept: INTERNAL MEDICINE | Facility: CLINIC | Age: 69
End: 2019-07-24

## 2019-07-24 DIAGNOSIS — H10.89 OTHER CONJUNCTIVITIS OF LEFT EYE: Primary | ICD-10-CM

## 2019-07-24 RX ORDER — OFLOXACIN 3 MG/ML
2 SOLUTION/ DROPS OPHTHALMIC 4 TIMES DAILY
Qty: 10 ML | Refills: 0 | Status: SHIPPED | OUTPATIENT
Start: 2019-07-24 | End: 2019-08-27

## 2019-07-24 NOTE — TELEPHONE ENCOUNTER
----- Message from Feli Snider sent at 7/24/2019  8:02 AM EDT -----  Contact: Pt  Pt was seen 7/22 and given eye drops, asked to call back in two days if not better.  Still having trouble with puss and it is both eyes.    [id number] - [place], [place] - [address] [phone] [facility] [phone] FX    Pt#[phone]

## 2019-07-24 NOTE — TELEPHONE ENCOUNTER
Please call and let her know I have sent in a different eye drops that have a different antibiotic in them. Tell her to let us know if these are not working after 2 days.

## 2019-08-07 DIAGNOSIS — I25.810 CORONARY ARTERY DISEASE INVOLVING AUTOLOGOUS ARTERY CORONARY BYPASS GRAFT WITHOUT ANGINA PECTORIS: ICD-10-CM

## 2019-08-07 RX ORDER — ISOSORBIDE MONONITRATE 30 MG/1
TABLET, EXTENDED RELEASE ORAL
Qty: 90 TABLET | Refills: 1 | Status: SHIPPED | OUTPATIENT
Start: 2019-08-07 | End: 2019-12-24

## 2019-08-20 ENCOUNTER — LAB (OUTPATIENT)
Dept: INTERNAL MEDICINE | Facility: CLINIC | Age: 69
End: 2019-08-20

## 2019-08-20 DIAGNOSIS — E78.00 PURE HYPERCHOLESTEROLEMIA: ICD-10-CM

## 2019-08-20 DIAGNOSIS — R73.01 IMPAIRED FASTING GLUCOSE: ICD-10-CM

## 2019-08-20 LAB
ALBUMIN SERPL-MCNC: 4.3 G/DL (ref 3.5–5.2)
ALBUMIN/GLOB SERPL: 1.5 G/DL
ALP SERPL-CCNC: 79 U/L (ref 39–117)
ALT SERPL-CCNC: 19 U/L (ref 1–33)
AST SERPL-CCNC: 24 U/L (ref 1–32)
BILIRUB SERPL-MCNC: 1.1 MG/DL (ref 0.2–1.2)
BUN SERPL-MCNC: 11 MG/DL (ref 8–23)
BUN/CREAT SERPL: 13.9 (ref 7–25)
CALCIUM SERPL-MCNC: 9.2 MG/DL (ref 8.6–10.5)
CHLORIDE SERPL-SCNC: 103 MMOL/L (ref 98–107)
CHOLEST SERPL-MCNC: 143 MG/DL (ref 0–200)
CO2 SERPL-SCNC: 24.7 MMOL/L (ref 22–29)
CREAT SERPL-MCNC: 0.79 MG/DL (ref 0.57–1)
GLOBULIN SER CALC-MCNC: 2.9 GM/DL
GLUCOSE SERPL-MCNC: 97 MG/DL (ref 65–99)
HBA1C MFR BLD: 5.7 % (ref 4.8–5.6)
HDLC SERPL-MCNC: 48 MG/DL (ref 40–60)
LDLC SERPL CALC-MCNC: 73 MG/DL (ref 0–100)
POTASSIUM SERPL-SCNC: 4.3 MMOL/L (ref 3.5–5.2)
PROT SERPL-MCNC: 7.2 G/DL (ref 6–8.5)
SODIUM SERPL-SCNC: 140 MMOL/L (ref 136–145)
TRIGL SERPL-MCNC: 111 MG/DL (ref 0–150)
VLDLC SERPL-MCNC: 22.2 MG/DL

## 2019-08-27 ENCOUNTER — OFFICE VISIT (OUTPATIENT)
Dept: INTERNAL MEDICINE | Facility: CLINIC | Age: 69
End: 2019-08-27

## 2019-08-27 VITALS
DIASTOLIC BLOOD PRESSURE: 60 MMHG | HEIGHT: 64 IN | BODY MASS INDEX: 25.44 KG/M2 | RESPIRATION RATE: 14 BRPM | WEIGHT: 149 LBS | SYSTOLIC BLOOD PRESSURE: 118 MMHG

## 2019-08-27 DIAGNOSIS — N39.0 RECURRENT UTI: ICD-10-CM

## 2019-08-27 DIAGNOSIS — I10 BENIGN ESSENTIAL HYPERTENSION: ICD-10-CM

## 2019-08-27 DIAGNOSIS — R73.01 IMPAIRED FASTING GLUCOSE: ICD-10-CM

## 2019-08-27 DIAGNOSIS — E78.00 PURE HYPERCHOLESTEROLEMIA: ICD-10-CM

## 2019-08-27 DIAGNOSIS — R30.0 BURNING WITH URINATION: Primary | ICD-10-CM

## 2019-08-27 LAB
BACTERIA UR QL AUTO: ABNORMAL /HPF
BILIRUB UR QL STRIP: NEGATIVE
CLARITY UR: ABNORMAL
COLOR UR: YELLOW
GLUCOSE UR STRIP-MCNC: NEGATIVE MG/DL
HGB UR QL STRIP.AUTO: NEGATIVE
HYALINE CASTS UR QL AUTO: ABNORMAL /LPF
KETONES UR QL STRIP: NEGATIVE
LEUKOCYTE ESTERASE UR QL STRIP.AUTO: ABNORMAL
MUCOUS THREADS URNS QL MICRO: ABNORMAL /HPF
NITRITE UR QL STRIP: NEGATIVE
PH UR STRIP.AUTO: 6 [PH] (ref 5–8)
PROT UR QL STRIP: NEGATIVE
RBC # UR: ABNORMAL /HPF
REF LAB TEST METHOD: ABNORMAL
SP GR UR STRIP: 1.02 (ref 1–1.03)
SQUAMOUS #/AREA URNS HPF: ABNORMAL /HPF
UROBILINOGEN UR QL STRIP: ABNORMAL
WBC UR QL AUTO: ABNORMAL /HPF

## 2019-08-27 PROCEDURE — 99214 OFFICE O/P EST MOD 30 MIN: CPT | Performed by: INTERNAL MEDICINE

## 2019-08-27 PROCEDURE — 87186 SC STD MICRODIL/AGAR DIL: CPT | Performed by: INTERNAL MEDICINE

## 2019-08-27 PROCEDURE — 81001 URINALYSIS AUTO W/SCOPE: CPT | Performed by: INTERNAL MEDICINE

## 2019-08-27 PROCEDURE — 87077 CULTURE AEROBIC IDENTIFY: CPT | Performed by: INTERNAL MEDICINE

## 2019-08-27 PROCEDURE — 87086 URINE CULTURE/COLONY COUNT: CPT | Performed by: INTERNAL MEDICINE

## 2019-08-27 RX ORDER — FLUTICASONE PROPIONATE 50 MCG
SPRAY, SUSPENSION (ML) NASAL
COMMUNITY
Start: 2018-08-08

## 2019-08-27 RX ORDER — CIPROFLOXACIN 250 MG/1
250 TABLET, FILM COATED ORAL 2 TIMES DAILY
Qty: 6 TABLET | Refills: 0 | Status: SHIPPED | OUTPATIENT
Start: 2019-08-27 | End: 2019-08-30

## 2019-08-27 NOTE — PROGRESS NOTES
"Subjective   Colleen Lovell is a 68 y.o. female.     History of Present Illness     /60 (BP Location: Left arm, Patient Position: Sitting, Cuff Size: Adult)   Resp 14   Ht 162.6 cm (64\")   Wt 67.6 kg (149 lb)   BMI 25.58 kg/m²     Current Outpatient Medications:   •  ALLERGY SERUM INJECTION, Inject 1 mL under the skin Every 30 (Thirty) Days., Disp: , Rfl:   •  aspirin 81 MG EC tablet, Take  by mouth., Disp: , Rfl:   •  Cetirizine HCl 10 MG capsule, Take  by mouth., Disp: , Rfl:   •  Cholecalciferol (VITAMIN D3) 5000 UNITS capsule capsule, Take 1,000 Units by mouth daily., Disp: , Rfl:   •  Cranberry (HM CRANBERRY SUPER STRENGTH) 300 MG tablet, Take 300 mg by mouth Daily., Disp: , Rfl:   •  fluticasone (FLONASE) 50 MCG/ACT nasal spray, , Disp: , Rfl:   •  isosorbide mononitrate (IMDUR) 30 MG 24 hr tablet, TAKE 1 TABLET EVERY DAY, Disp: 90 tablet, Rfl: 1  •  lisinopril (PRINIVIL,ZESTRIL) 2.5 MG tablet, TAKE 1 TABLET EVERY DAY, Disp: 90 tablet, Rfl: 3  •  Magnesium Oxide 400 MG capsule, Take  by mouth., Disp: , Rfl:   •  metoprolol tartrate (LOPRESSOR) 25 MG tablet, TAKE 1 TABLET TWICE DAILY  ( DOSE INCREASE  ), Disp: 180 tablet, Rfl: 3  •  Multiple Vitamin (MULTI VITAMIN DAILY PO), Take  by mouth., Disp: , Rfl:   •  Omega-3 Fatty Acids (FISH OIL) 1200 MG capsule capsule, Take  by mouth., Disp: , Rfl:   •  PROAIR  (90 Base) MCG/ACT inhaler, , Disp: , Rfl:   •  Probiotic Product (PROBIOTIC-10) capsule, , Disp: , Rfl:   •  ranitidine (ZANTAC) 150 MG capsule, Take 1 capsule by mouth 2 (two) times a day., Disp: 60 capsule, Rfl: 11  •  rosuvastatin (CRESTOR) 10 MG tablet, TAKE 1 TABLET EVERY DAY, Disp: 90 tablet, Rfl: 3  •  traZODone (DESYREL) 100 MG tablet, TAKE 1 TABLET EVERY NIGHT, Disp: 90 tablet, Rfl: 3    Patient has long-standing benign essential HTN.  BP is usually well controlled with daily use of b-blocker and ACEI. On low salt diet with good compliance. Takes medication regularly. Denies chest " "pain, dyspnea,lightheadedness,  lower extremity edema. Patient checks blood pressure at home regularly. and she is conmcerned that her BP tends to be low arounf 10 am. Last week on one occasion it got fown to 60 systolic, and patient was close to syncope. Usually she exercises walks in early am, and her episodes of low BP had happen after exercise.    Patient has been diagnosed with hyperlipidemia. Target LDL is < 70 mg/dl (\"very high\" risk for CHD). Patient is on low fat diet with good compliance. Patient is compliant with treatment: daily use of Crestor (rozuvastatin). Side effects of medication: none. Exercise 5-6 days a week and walking.    Patient has impaired fasting blood sugars.  Colleen T Lovell uses diet and exercise for blood sugars control. Patient does not check home blood sugars.. Compliance with low carb diabetic diet is good.     Colleen AMELIE Lovell 68 y.o. female presents complaining of urgency of urination, frequency of urination and painful urination. Symptoms started few days ago. Colleen Lovell denies  flank pain. Colleen Lovell denies  fever. Patient rates dyscomfort as mild.  Patient's past medical history is significant for a history of frequent urinary tract infections.      The following portions of the patient's history were reviewed and updated as appropriate: allergies, current medications, past family history, past medical history, past social history, past surgical history and problem list.    Review of Systems   Constitutional: Negative for chills and fever.   Eyes: Negative for pain and redness.   Respiratory: Negative for cough and shortness of breath.    Cardiovascular: Negative for chest pain and leg swelling.   Neurological: Positive for dizziness. Negative for headaches.       Objective   Physical Exam   Constitutional: She is oriented to person, place, and time. She appears well-developed and well-nourished.   HENT:   Head: Normocephalic and atraumatic.   Right Ear: " Tympanic membrane, external ear and ear canal normal.   Left Ear: Tympanic membrane, external ear and ear canal normal.   Nose: Nose normal. Right sinus exhibits no maxillary sinus tenderness and no frontal sinus tenderness. Left sinus exhibits no maxillary sinus tenderness and no frontal sinus tenderness.   Mouth/Throat: Uvula is midline, oropharynx is clear and moist and mucous membranes are normal.   Eyes: Conjunctivae and EOM are normal. Pupils are equal, round, and reactive to light. Right eye exhibits no discharge. Left eye exhibits no discharge. No scleral icterus.   Neck: Neck supple. No JVD present.   Cardiovascular: Normal rate, regular rhythm and normal heart sounds. Exam reveals no gallop and no friction rub.   No murmur heard.  Pulmonary/Chest: Effort normal and breath sounds normal. She has no wheezes. She has no rales.   Musculoskeletal: She exhibits no edema.   Lymphadenopathy:     She has no cervical adenopathy.   Neurological: She is alert and oriented to person, place, and time. No cranial nerve deficit.   Skin: Skin is warm and dry. No rash noted.   Psychiatric: She has a normal mood and affect. Her behavior is normal.   Vitals reviewed.      Assessment/Plan   Colleen was seen today for hypertension and hyperlipidemia.    Diagnoses and all orders for this visit:    Burning with urination  -     Urinalysis With Microscopic If Indicated (No Culture) - Urine, Clean Catch; Future  -     Urinalysis With Microscopic If Indicated (No Culture) - Urine, Clean Catch  -     Urinalysis, Microscopic Only - Urine, Clean Catch; Future  -     Urinalysis, Microscopic Only - Urine, Clean Catch  -     Urine Culture - Urine, Urine, Clean Catch; Future  -     Urine Culture - Urine, Urine, Clean Catch    Pure hypercholesterolemia    Benign essential hypertension    Impaired fasting glucose    Recurrent UTI  -     ciprofloxacin (CIPRO) 250 MG tablet; Take 1 tablet by mouth 2 (Two) Times a Day for 3 days.        HTN -  "low blood pressure at the lunch is of concern with current medication regimen. Normal kidney tests and electrolytes. Recommended low salt diet. Continue same dose of Metoprolol, but will change the dose of Lisinopril to half a pill. Continue home BP monitoring.  Hyperlipidemia - well controlled with statin. Normal liver function tests. LDL target is below < 70 mg/dl (\"very high\" risk for CHD). Patient's 74. Continue same treatment. Regular exercise recommended.  Impaired fasting blood sugar - had resolved with better diet and regular exercise. Hb A1C is   Lab Results   Component Value Date    HGBA1C 5.70 (H) 08/20/2019    .  No hypoglycemic episodes. Low carb diet and regular exercise recommended.  Recurrent UTI - recommended to take D-Mannose daily. Will check urine culture and treat with Cipro empirically.    "

## 2019-08-30 LAB
BACTERIA SPEC AEROBE CULT: ABNORMAL
BACTERIA SPEC AEROBE CULT: ABNORMAL

## 2019-09-03 ENCOUNTER — TELEPHONE (OUTPATIENT)
Dept: INTERNAL MEDICINE | Facility: CLINIC | Age: 69
End: 2019-09-03

## 2019-09-03 DIAGNOSIS — N30.90 CYSTITIS: Primary | ICD-10-CM

## 2019-09-03 RX ORDER — NITROFURANTOIN 25; 75 MG/1; MG/1
100 CAPSULE ORAL 2 TIMES DAILY
Qty: 10 CAPSULE | Refills: 0 | Status: SHIPPED | OUTPATIENT
Start: 2019-09-03 | End: 2019-09-08

## 2019-09-03 NOTE — TELEPHONE ENCOUNTER
----- Message from Shira Robison sent at 9/3/2019  2:38 PM EDT -----  Contact: Patient   Patient called and, per prior notes, was asked if she could tolerate Macrobid.  To her knowledge, she can as she has not had this in the past.  Please advise.     Pt#[phone]      [id number] - [place], [place] - [address] [facility] [phone] [facility] [phone] FX

## 2019-09-03 NOTE — TELEPHONE ENCOUNTER
----- Message from Feli Snider sent at 9/3/2019  9:27 AM EDT -----  Contact: pt  Pt is calling stating she is still having UTI symptoms.   Her UA came back with bacteria that does not work with Cipro per patient.  She is asking that we call something into her pharmacy now that we know this,    Pt#[phone]     [facility] [address] [place] [address] [phone] [facility] [phone]

## 2019-09-11 DIAGNOSIS — N39.0 RECURRENT UTI: Primary | ICD-10-CM

## 2019-09-17 ENCOUNTER — LAB (OUTPATIENT)
Dept: INTERNAL MEDICINE | Facility: CLINIC | Age: 69
End: 2019-09-17

## 2019-09-17 DIAGNOSIS — N39.0 RECURRENT UTI: Primary | ICD-10-CM

## 2019-09-17 LAB
BACTERIA UR QL AUTO: ABNORMAL /HPF
HYALINE CASTS UR QL AUTO: ABNORMAL /LPF
RBC # UR: ABNORMAL /HPF
REF LAB TEST METHOD: ABNORMAL
SQUAMOUS #/AREA URNS HPF: ABNORMAL /HPF
WBC UR QL AUTO: ABNORMAL /HPF

## 2019-09-17 PROCEDURE — 81015 MICROSCOPIC EXAM OF URINE: CPT | Performed by: INTERNAL MEDICINE

## 2019-09-17 PROCEDURE — 87186 SC STD MICRODIL/AGAR DIL: CPT | Performed by: INTERNAL MEDICINE

## 2019-09-17 PROCEDURE — 87086 URINE CULTURE/COLONY COUNT: CPT | Performed by: INTERNAL MEDICINE

## 2019-09-17 PROCEDURE — 87088 URINE BACTERIA CULTURE: CPT | Performed by: INTERNAL MEDICINE

## 2019-09-17 NOTE — PROGRESS NOTES
Please call patient: Urinalysis still shows urinary tract infection, I am waiting for urinary culture results to determine have to treat

## 2019-09-19 ENCOUNTER — TELEPHONE (OUTPATIENT)
Dept: INTERNAL MEDICINE | Facility: CLINIC | Age: 69
End: 2019-09-19

## 2019-09-19 DIAGNOSIS — N39.0 URINARY TRACT INFECTION DUE TO EXTENDED-SPECTRUM BETA LACTAMASE (ESBL) PRODUCING ESCHERICHIA COLI: Primary | ICD-10-CM

## 2019-09-19 DIAGNOSIS — Z16.12 URINARY TRACT INFECTION DUE TO EXTENDED-SPECTRUM BETA LACTAMASE (ESBL) PRODUCING ESCHERICHIA COLI: Primary | ICD-10-CM

## 2019-09-19 DIAGNOSIS — B96.29 URINARY TRACT INFECTION DUE TO EXTENDED-SPECTRUM BETA LACTAMASE (ESBL) PRODUCING ESCHERICHIA COLI: Primary | ICD-10-CM

## 2019-09-19 LAB — BACTERIA SPEC AEROBE CULT: ABNORMAL

## 2019-09-19 RX ORDER — GRANULES FOR ORAL 3 G/1
3 POWDER ORAL
Qty: 3 PACKET | Refills: 0 | Status: SHIPPED | OUTPATIENT
Start: 2019-09-19 | End: 2020-02-24

## 2019-09-19 RX ORDER — NITROFURANTOIN 25; 75 MG/1; MG/1
100 CAPSULE ORAL 2 TIMES DAILY
Qty: 20 CAPSULE | Refills: 0 | Status: SHIPPED | OUTPATIENT
Start: 2019-09-19 | End: 2020-02-24

## 2019-09-19 NOTE — TELEPHONE ENCOUNTER
----- Message from Feli Snider sent at 9/19/2019 10:44 AM EDT -----  Contact: Amber with [facility] lab  Calling in for a out of range UA.  Escherichia coli- results in epic.     Caller#[phone]

## 2019-09-23 ENCOUNTER — TELEPHONE (OUTPATIENT)
Dept: INTERNAL MEDICINE | Facility: CLINIC | Age: 69
End: 2019-09-23

## 2019-09-23 NOTE — TELEPHONE ENCOUNTER
----- Message from Anni Peralta sent at 9/23/2019  3:47 PM EDT -----  Pt has been taking MACROBID 100 MG capsule  Still has several days left.  She has not yet been able to get the Fosfomycin (MONUROL) 3 g pack    Sig - Route: Take 3 g by mouth Every 3 (Three) Days From the pharmacy.  They should have it in stock for her tomorrow.  She would like to know if she can take them at the same time or does she need to wait until she is done with the Macrobid before she starts the  Fosfomycin (MONUROL) 3 g pack         Pt # [phone]

## 2019-09-27 ENCOUNTER — OFFICE VISIT (OUTPATIENT)
Dept: INTERNAL MEDICINE | Facility: CLINIC | Age: 69
End: 2019-09-27

## 2019-09-27 VITALS
HEIGHT: 64 IN | SYSTOLIC BLOOD PRESSURE: 118 MMHG | RESPIRATION RATE: 14 BRPM | BODY MASS INDEX: 25.27 KG/M2 | WEIGHT: 148 LBS | DIASTOLIC BLOOD PRESSURE: 72 MMHG

## 2019-09-27 DIAGNOSIS — E78.00 PURE HYPERCHOLESTEROLEMIA: ICD-10-CM

## 2019-09-27 DIAGNOSIS — N39.0 URINARY TRACT INFECTION DUE TO EXTENDED-SPECTRUM BETA LACTAMASE (ESBL) PRODUCING ESCHERICHIA COLI: ICD-10-CM

## 2019-09-27 DIAGNOSIS — I10 BENIGN ESSENTIAL HYPERTENSION: Primary | ICD-10-CM

## 2019-09-27 DIAGNOSIS — Z16.12 URINARY TRACT INFECTION DUE TO EXTENDED-SPECTRUM BETA LACTAMASE (ESBL) PRODUCING ESCHERICHIA COLI: ICD-10-CM

## 2019-09-27 DIAGNOSIS — B96.29 URINARY TRACT INFECTION DUE TO EXTENDED-SPECTRUM BETA LACTAMASE (ESBL) PRODUCING ESCHERICHIA COLI: ICD-10-CM

## 2019-09-27 PROCEDURE — 99213 OFFICE O/P EST LOW 20 MIN: CPT | Performed by: INTERNAL MEDICINE

## 2019-09-27 NOTE — PROGRESS NOTES
"Subjective   Colleen Lovell is a 69 y.o. female.     History of Present Illness   Colleen Lovell 69 y.o. female presents today for benign essential hypertension f/u. Last was seen on 08/27/2019 and on that visit medication was changed due to low BP, that now she attributed to taking Mactobid, and her long walks.We had changed Lisinopril to half a tablet.  Patient is not compliant with treatment and denies  side effects. When patiirma mansfield figured outr connction to Mactobif, and had not changed her BP medication, and her BP stays in the good range.     Patient is paige here for recurrent UTI jimmie to highly resistant E.Coli. She had not been able to get 3 doses of Fosfomycin, and just got 1 dose, still takes Macrobid, that will be finished in 2 days.No s-ms.Feels better. No more pain.    /72 (BP Location: Left arm, Patient Position: Sitting, Cuff Size: Adult)   Resp 14   Ht 162.6 cm (64\")   Wt 67.1 kg (148 lb)   BMI 25.40 kg/m²     Current Outpatient Medications:   •  ALLERGY SERUM INJECTION, Inject 1 mL under the skin Every 30 (Thirty) Days., Disp: , Rfl:   •  aspirin 81 MG EC tablet, Take  by mouth., Disp: , Rfl:   •  Cetirizine HCl 10 MG capsule, Take  by mouth., Disp: , Rfl:   •  Cholecalciferol (VITAMIN D3) 5000 UNITS capsule capsule, Take 1,000 Units by mouth daily., Disp: , Rfl:   •  Cranberry ( CRANBERRY SUPER STRENGTH) 300 MG tablet, Take 300 mg by mouth Daily., Disp: , Rfl:   •  fluticasone (FLONASE) 50 MCG/ACT nasal spray, , Disp: , Rfl:   •  fosfomycin (MONUROL) 3 g pack, Take 3 g by mouth Every 3 (Three) Days., Disp: 3 packet, Rfl: 0  •  isosorbide mononitrate (IMDUR) 30 MG 24 hr tablet, TAKE 1 TABLET EVERY DAY, Disp: 90 tablet, Rfl: 1  •  lisinopril (PRINIVIL,ZESTRIL) 2.5 MG tablet, TAKE 1 TABLET EVERY DAY, Disp: 90 tablet, Rfl: 3  •  Magnesium Oxide 400 MG capsule, Take  by mouth., Disp: , Rfl:   •  metoprolol tartrate (LOPRESSOR) 25 MG tablet, TAKE 1 TABLET TWICE DAILY  ( DOSE INCREASE  " ), Disp: 180 tablet, Rfl: 3  •  Multiple Vitamin (MULTI VITAMIN DAILY PO), Take  by mouth., Disp: , Rfl:   •  nitrofurantoin, macrocrystal-monohydrate, (MACROBID) 100 MG capsule, Take 1 capsule by mouth 2 (Two) Times a Day., Disp: 20 capsule, Rfl: 0  •  Omega-3 Fatty Acids (FISH OIL) 1200 MG capsule capsule, Take  by mouth., Disp: , Rfl:   •  PROAIR  (90 Base) MCG/ACT inhaler, , Disp: , Rfl:   •  Probiotic Product (PROBIOTIC-10) capsule, , Disp: , Rfl:   •  ranitidine (ZANTAC) 150 MG capsule, Take 1 capsule by mouth 2 (two) times a day., Disp: 60 capsule, Rfl: 11  •  rosuvastatin (CRESTOR) 10 MG tablet, TAKE 1 TABLET EVERY DAY, Disp: 90 tablet, Rfl: 3  •  traZODone (DESYREL) 100 MG tablet, TAKE 1 TABLET EVERY NIGHT, Disp: 90 tablet, Rfl: 3  The following portions of the patient's history were reviewed and updated as appropriate: allergies, current medications, past family history, past medical history, past social history, past surgical history and problem list.    Review of Systems   Constitutional: Negative for chills and fever.   Eyes: Negative for pain and redness.   Respiratory: Negative for cough and shortness of breath.    Cardiovascular: Negative for chest pain, palpitations and leg swelling.   Musculoskeletal: Negative for neck pain.   Neurological: Negative for dizziness and headaches.       Objective   Physical Exam   Constitutional: She is oriented to person, place, and time. She appears well-developed and well-nourished.   HENT:   Head: Normocephalic and atraumatic.   Right Ear: Tympanic membrane, external ear and ear canal normal.   Left Ear: Tympanic membrane, external ear and ear canal normal.   Nose: Nose normal. Right sinus exhibits no maxillary sinus tenderness and no frontal sinus tenderness. Left sinus exhibits no maxillary sinus tenderness and no frontal sinus tenderness.   Mouth/Throat: Uvula is midline, oropharynx is clear and moist and mucous membranes are normal.   Eyes: Conjunctivae  and EOM are normal. Pupils are equal, round, and reactive to light. Right eye exhibits no discharge. Left eye exhibits no discharge. No scleral icterus.   Neck: Neck supple. No JVD present.   Cardiovascular: Normal rate, regular rhythm and normal heart sounds. Exam reveals no gallop and no friction rub.   No murmur heard.  Pulmonary/Chest: Effort normal and breath sounds normal. She has no wheezes. She has no rales.   Musculoskeletal: She exhibits no edema.   Lymphadenopathy:     She has no cervical adenopathy.   Neurological: She is alert and oriented to person, place, and time. No cranial nerve deficit.   Skin: Skin is warm and dry. No rash noted.   Psychiatric: She has a normal mood and affect. Her behavior is normal.   Vitals reviewed.      Assessment/Plan   Colleen was seen today for hypertension.    Diagnoses and all orders for this visit:    Benign essential hypertension    Urinary tract infection due to extended-spectrum beta lactamase (ESBL) producing Escherichia coli      1. HTN - well controlled w/o change in medication.   2. UTI due to EDBL producing E.Coli - had received just 1 dose of Fosfomycin, still on Macrobid, will check urine culture early next week.

## 2019-10-02 ENCOUNTER — APPOINTMENT (OUTPATIENT)
Dept: WOMENS IMAGING | Facility: HOSPITAL | Age: 69
End: 2019-10-02

## 2019-10-02 ENCOUNTER — LAB (OUTPATIENT)
Dept: INTERNAL MEDICINE | Facility: CLINIC | Age: 69
End: 2019-10-02

## 2019-10-02 DIAGNOSIS — N39.0 URINARY TRACT INFECTION DUE TO EXTENDED-SPECTRUM BETA LACTAMASE (ESBL) PRODUCING ESCHERICHIA COLI: ICD-10-CM

## 2019-10-02 DIAGNOSIS — Z16.12 URINARY TRACT INFECTION DUE TO EXTENDED-SPECTRUM BETA LACTAMASE (ESBL) PRODUCING ESCHERICHIA COLI: ICD-10-CM

## 2019-10-02 DIAGNOSIS — B96.29 URINARY TRACT INFECTION DUE TO EXTENDED-SPECTRUM BETA LACTAMASE (ESBL) PRODUCING ESCHERICHIA COLI: ICD-10-CM

## 2019-10-02 PROCEDURE — 77067 SCR MAMMO BI INCL CAD: CPT | Performed by: RADIOLOGY

## 2019-10-02 PROCEDURE — 87086 URINE CULTURE/COLONY COUNT: CPT | Performed by: INTERNAL MEDICINE

## 2019-10-02 PROCEDURE — 77063 BREAST TOMOSYNTHESIS BI: CPT | Performed by: RADIOLOGY

## 2019-10-03 LAB — BACTERIA SPEC AEROBE CULT: NO GROWTH

## 2019-10-03 NOTE — PROGRESS NOTES
Please call patient: No urinary tract infection, urine is still rile, nothing gross on a culture.  Great news!

## 2019-12-23 DIAGNOSIS — I25.810 CORONARY ARTERY DISEASE INVOLVING AUTOLOGOUS ARTERY CORONARY BYPASS GRAFT WITHOUT ANGINA PECTORIS: ICD-10-CM

## 2019-12-24 RX ORDER — ISOSORBIDE MONONITRATE 30 MG/1
TABLET, EXTENDED RELEASE ORAL
Qty: 90 TABLET | Refills: 1 | Status: SHIPPED | OUTPATIENT
Start: 2019-12-24 | End: 2020-06-25

## 2020-02-17 ENCOUNTER — LAB (OUTPATIENT)
Dept: INTERNAL MEDICINE | Facility: CLINIC | Age: 70
End: 2020-02-17

## 2020-02-17 DIAGNOSIS — E78.00 PURE HYPERCHOLESTEROLEMIA: Primary | ICD-10-CM

## 2020-02-17 DIAGNOSIS — R82.90 ABNORMAL URINE FINDINGS: ICD-10-CM

## 2020-02-17 DIAGNOSIS — I10 BENIGN ESSENTIAL HYPERTENSION: ICD-10-CM

## 2020-02-17 LAB
ALBUMIN SERPL-MCNC: 4.3 G/DL (ref 3.5–5.2)
ALBUMIN/GLOB SERPL: 1.7 G/DL
ALP SERPL-CCNC: 77 U/L (ref 39–117)
ALT SERPL W P-5'-P-CCNC: 18 U/L (ref 1–33)
ANION GAP SERPL CALCULATED.3IONS-SCNC: 9.8 MMOL/L (ref 5–15)
AST SERPL-CCNC: 26 U/L (ref 1–32)
BACTERIA UR QL AUTO: ABNORMAL /HPF
BASOPHILS # BLD AUTO: 0.04 10*3/MM3 (ref 0–0.2)
BASOPHILS NFR BLD AUTO: 0.6 % (ref 0–1.5)
BILIRUB SERPL-MCNC: 1 MG/DL (ref 0.2–1.2)
BILIRUB UR QL STRIP: NEGATIVE
BUN BLD-MCNC: 11 MG/DL (ref 8–23)
BUN/CREAT SERPL: 15.3 (ref 7–25)
CALCIUM SPEC-SCNC: 9 MG/DL (ref 8.6–10.5)
CHLORIDE SERPL-SCNC: 103 MMOL/L (ref 98–107)
CHOLEST SERPL-MCNC: 138 MG/DL (ref 0–200)
CLARITY UR: CLEAR
CO2 SERPL-SCNC: 24.2 MMOL/L (ref 22–29)
COLOR UR: YELLOW
CREAT BLD-MCNC: 0.72 MG/DL (ref 0.57–1)
DEPRECATED RDW RBC AUTO: 41.9 FL (ref 37–54)
EOSINOPHIL # BLD AUTO: 0.27 10*3/MM3 (ref 0–0.4)
EOSINOPHIL NFR BLD AUTO: 4.2 % (ref 0.3–6.2)
ERYTHROCYTE [DISTWIDTH] IN BLOOD BY AUTOMATED COUNT: 13 % (ref 12.3–15.4)
GFR SERPL CREATININE-BSD FRML MDRD: 81 ML/MIN/1.73
GLOBULIN UR ELPH-MCNC: 2.6 GM/DL
GLUCOSE BLD-MCNC: 109 MG/DL (ref 65–99)
GLUCOSE UR STRIP-MCNC: NEGATIVE MG/DL
HCT VFR BLD AUTO: 39.7 % (ref 34–46.6)
HDLC SERPL-MCNC: 55 MG/DL (ref 40–60)
HGB BLD-MCNC: 13.2 G/DL (ref 12–15.9)
HGB UR QL STRIP.AUTO: ABNORMAL
HYALINE CASTS UR QL AUTO: ABNORMAL /LPF
KETONES UR QL STRIP: NEGATIVE
LDLC SERPL CALC-MCNC: 68 MG/DL (ref 0–100)
LDLC/HDLC SERPL: 1.24 {RATIO}
LEUKOCYTE ESTERASE UR QL STRIP.AUTO: ABNORMAL
LYMPHOCYTES # BLD AUTO: 1.89 10*3/MM3 (ref 0.7–3.1)
LYMPHOCYTES NFR BLD AUTO: 29.1 % (ref 19.6–45.3)
MCH RBC QN AUTO: 29.9 PG (ref 26.6–33)
MCHC RBC AUTO-ENTMCNC: 33.2 G/DL (ref 31.5–35.7)
MCV RBC AUTO: 89.8 FL (ref 79–97)
MONOCYTES # BLD AUTO: 0.52 10*3/MM3 (ref 0.1–0.9)
MONOCYTES NFR BLD AUTO: 8 % (ref 5–12)
NEUTROPHILS # BLD AUTO: 3.78 10*3/MM3 (ref 1.7–7)
NEUTROPHILS NFR BLD AUTO: 58.1 % (ref 42.7–76)
NITRITE UR QL STRIP: NEGATIVE
PH UR STRIP.AUTO: 7 [PH] (ref 5–8)
PLATELET # BLD AUTO: 303 10*3/MM3 (ref 140–450)
PMV BLD AUTO: 10 FL (ref 6–12)
POTASSIUM BLD-SCNC: 4.8 MMOL/L (ref 3.5–5.2)
PROT SERPL-MCNC: 6.9 G/DL (ref 6–8.5)
PROT UR QL STRIP: NEGATIVE
RBC # BLD AUTO: 4.42 10*6/MM3 (ref 3.77–5.28)
RBC # UR: ABNORMAL /HPF
REF LAB TEST METHOD: ABNORMAL
SODIUM BLD-SCNC: 137 MMOL/L (ref 136–145)
SP GR UR STRIP: 1.01 (ref 1–1.03)
SQUAMOUS #/AREA URNS HPF: ABNORMAL /HPF
TRANS CELLS #/AREA URNS HPF: ABNORMAL /HPF
TRIGL SERPL-MCNC: 74 MG/DL (ref 0–150)
TSH SERPL DL<=0.05 MIU/L-ACNC: 2.19 UIU/ML (ref 0.27–4.2)
UROBILINOGEN UR QL STRIP: ABNORMAL
VLDLC SERPL-MCNC: 14.8 MG/DL (ref 5–40)
WBC NRBC COR # BLD: 6.5 10*3/MM3 (ref 3.4–10.8)
WBC UR QL AUTO: ABNORMAL /HPF

## 2020-02-17 PROCEDURE — 36415 COLL VENOUS BLD VENIPUNCTURE: CPT | Performed by: INTERNAL MEDICINE

## 2020-02-17 PROCEDURE — 81001 URINALYSIS AUTO W/SCOPE: CPT | Performed by: INTERNAL MEDICINE

## 2020-02-17 PROCEDURE — 85025 COMPLETE CBC W/AUTO DIFF WBC: CPT | Performed by: INTERNAL MEDICINE

## 2020-02-17 PROCEDURE — 87086 URINE CULTURE/COLONY COUNT: CPT | Performed by: INTERNAL MEDICINE

## 2020-02-17 PROCEDURE — 80053 COMPREHEN METABOLIC PANEL: CPT | Performed by: INTERNAL MEDICINE

## 2020-02-17 PROCEDURE — 84443 ASSAY THYROID STIM HORMONE: CPT | Performed by: INTERNAL MEDICINE

## 2020-02-17 PROCEDURE — 80061 LIPID PANEL: CPT | Performed by: INTERNAL MEDICINE

## 2020-02-17 PROCEDURE — 87088 URINE BACTERIA CULTURE: CPT | Performed by: INTERNAL MEDICINE

## 2020-02-17 PROCEDURE — 87186 SC STD MICRODIL/AGAR DIL: CPT | Performed by: INTERNAL MEDICINE

## 2020-02-19 ENCOUNTER — TELEPHONE (OUTPATIENT)
Dept: INTERNAL MEDICINE | Facility: CLINIC | Age: 70
End: 2020-02-19

## 2020-02-19 LAB — BACTERIA SPEC AEROBE CULT: ABNORMAL

## 2020-02-19 RX ORDER — CIPROFLOXACIN 250 MG/1
250 TABLET, FILM COATED ORAL 2 TIMES DAILY
Qty: 10 TABLET | Refills: 1 | Status: SHIPPED | OUTPATIENT
Start: 2020-02-19 | End: 2020-02-24

## 2020-02-19 NOTE — TELEPHONE ENCOUNTER
Jennifer with [facility] called stating she has critical lab for UA.   Patient has ESBL ecoli. Please advise, result in chart.

## 2020-02-24 ENCOUNTER — OFFICE VISIT (OUTPATIENT)
Dept: INTERNAL MEDICINE | Facility: CLINIC | Age: 70
End: 2020-02-24

## 2020-02-24 VITALS
DIASTOLIC BLOOD PRESSURE: 76 MMHG | HEIGHT: 64 IN | SYSTOLIC BLOOD PRESSURE: 118 MMHG | BODY MASS INDEX: 24.59 KG/M2 | RESPIRATION RATE: 14 BRPM | WEIGHT: 144 LBS

## 2020-02-24 DIAGNOSIS — E78.00 PURE HYPERCHOLESTEROLEMIA: ICD-10-CM

## 2020-02-24 DIAGNOSIS — B96.29 URINARY TRACT INFECTION DUE TO EXTENDED-SPECTRUM BETA LACTAMASE (ESBL) PRODUCING ESCHERICHIA COLI: ICD-10-CM

## 2020-02-24 DIAGNOSIS — Z12.31 VISIT FOR SCREENING MAMMOGRAM: ICD-10-CM

## 2020-02-24 DIAGNOSIS — Z00.00 HEALTH CARE MAINTENANCE: Primary | ICD-10-CM

## 2020-02-24 DIAGNOSIS — N39.0 URINARY TRACT INFECTION DUE TO EXTENDED-SPECTRUM BETA LACTAMASE (ESBL) PRODUCING ESCHERICHIA COLI: ICD-10-CM

## 2020-02-24 DIAGNOSIS — Z16.12 URINARY TRACT INFECTION DUE TO EXTENDED-SPECTRUM BETA LACTAMASE (ESBL) PRODUCING ESCHERICHIA COLI: ICD-10-CM

## 2020-02-24 DIAGNOSIS — R35.1 NOCTURIA: ICD-10-CM

## 2020-02-24 DIAGNOSIS — Z78.0 POST-MENOPAUSE: ICD-10-CM

## 2020-02-24 DIAGNOSIS — F51.01 PRIMARY INSOMNIA: ICD-10-CM

## 2020-02-24 DIAGNOSIS — R73.01 IMPAIRED FASTING GLUCOSE: ICD-10-CM

## 2020-02-24 PROCEDURE — 99214 OFFICE O/P EST MOD 30 MIN: CPT | Performed by: INTERNAL MEDICINE

## 2020-02-24 PROCEDURE — G0439 PPPS, SUBSEQ VISIT: HCPCS | Performed by: INTERNAL MEDICINE

## 2020-02-24 RX ORDER — NITROFURANTOIN 25; 75 MG/1; MG/1
100 CAPSULE ORAL 2 TIMES DAILY
Qty: 14 CAPSULE | Refills: 0 | Status: SHIPPED | OUTPATIENT
Start: 2020-02-24 | End: 2020-03-13

## 2020-02-24 NOTE — PATIENT INSTRUCTIONS
Annual wellness visit with preventive exam as well as age and risk appropriate councelling completed.    Cardiovascular disease councelling: current. Recommended: Continue statin., Excellent cholesterol.  Diabetes screening and councelling: current. Recommended: Low carb diet recommended. Needs to avoid starches and sweets.  Breast cancer screening: up to date. Recommended every year..  Osteoporosis screening: is due, will schedule. Benefits explained..  Glaucoma screening: up to date.   AAA screening: not needed..  Hep C screening (born between [birth year]80-6601) completed..  Colorectal cancer screening: up to date. Recommended every 5 years. Next screening is recommended in 03/2020, will refer...    Immunizations:   1. Influenza vaccine  is up to date and recommended yearly.   2. Pneumococcal vaccines: completed.   3. Shingles prevention:  Zostavax completed, recommended to get new shingles vaccine Shingrix at the pharmacy, benefits explained.      Advanced directives planning: has Living Will. Discussed. I agree with patient wishes and decision..I had asked the patient to provide us with copy of her Living Will.    Medications reviewed and medication list updated.   Potential harmful drug-disease interactions in the elderly:statin and impaired fasting blood sugar.  High risk medication in elderly: none  ASA use: continue daily ASA 81 mg.    Urinary tract infection due to multidrug-resistant E. coli-patient has a history of frequent urinary tract infections, including multidrug resistant E. coli in September 2020 (successfully treated with fosfomycin at that time with clearing of the bacteria from your urine, as it was documented by urine culture)-as patient is symptomatic in spite of taking quinolone for a week, we will tailor her treatment according to the sensitivity to nitrofurantoin, documented per urine culture.  We will treat your with Macrobid twice a day for a week, then she will have a straight cath urinalysis  "and culture done in your urologist office.  I will reevaluate patient in a months.  Advised to call us if your urinary symptoms persist.  Insomnia - patient had been suffering from chronic insomnia and and had failed OTC options. Recommended daily exercise. and Advised to avoid caffeinated beverages in the evening. Will continue Trazodone.    Colleen AMELIE Lovell will be under my care with regular visits for the reassessment of the treatment.  Hyperlipidemia - well controlled with statin. Normal liver function tests. LDL target is below < 70 mg/dl (\"very high\" risk for CHD). Patient's 69. Continue same treatment. Regular exercise recommended.  Impaired fasting blood sugars. Low carb diet and regular exercise recommended.  Nocturia - will try Myrbetriq 25 mg at bedtime. Samples given, will reevaluate in a month.     Return in about 4 weeks (around 3/23/2020) for nocturia, MDR R.Coli UTI.  "

## 2020-02-24 NOTE — PROGRESS NOTES
"Onelia Lovell is a 69 y.o. female.     History of Present Illness   /76 (BP Location: Left arm, Patient Position: Sitting, Cuff Size: Adult)   Resp 14   Ht 162.6 cm (64\")   Wt 65.3 kg (144 lb)   BMI 24.72 kg/m²   Patient is being seen for subsequent wellness visit.  Hospitalizations in a past: 3, none last year.  Once per lifetime Medicare screening tests: ECG - under the care of cardiologist    AAA risk assessment: 1. FH: none. 2.H/o tobacco smoking: none. 3. CV or PAD: as per medical problems list.    Tobacco use: none   Alcohol use: none  Illicit drugs use: none  Diet: healthy heart  Exercise: used to exercise regularly, but had not been lately due to poo weather    Anxiety screening: How many days in the last 2 weeks you were  1. Feeling anxious, nervous, on edge: none  2. Unable to stop worrying: none  3. Worrying too much about different things: none  4. Having problems relaxing:none  5. Feeling restless or unable to sit still:none  6. Feeling irritable or easely annoyed: none  7. Being afraid that something awful might happen: none    Depression screening PHQ9:  Over the past 2 weeks how often have you been bothered by  1. Lack of interest or pleasuer in usual activities: none  2. Feeling down, depressed, hopeless:none  3. Troubles falling asleep/sleeping too long:none  4. Feeling tired, having little energy: none  5. Poor appetite or overeating: none  6. Feeling bad about yourself:none.  7. Trouble concentrating: at the baseline.  8. Moving or speaking too slow or much faster than usual: none  9. Thoughts about harming yourself:none.    Cognitive impairment screening:   Do you have difficulties with:  1. Language: no  2. Behavior: no  3. Learning/retaining new information: no  4. Handling complex tasks: no  5. Reasoning: no  6. Spacial ability and orientation: no    Hearing: normal.  Driving: unrestricted  ADL: independent  ADL details: Does patient needs help with:  telephone use: " "no  Transportation: no  Housework: no  Shopping: no  meal preparation: no  laundry: no  managing medication:no  Participate in the social activities: Y    Fall risk factors:   1.Use of alcohol: no    2.Polypharmacy: yes  3.H/o of previous fall:  no  4. Mobility impairment:  no  5. Visual impairment:  no  6. Deconditioning: no  7. Use of antihypertensive medication:  yes  8. Use of sedatives: no  9. Use of antidepressant: yes    Home safety:   1.loose rugs: no   2.unfamiliar environment: no   3. Uneven floors: no   4. No grab bars in the bathroom: no  5. No banister on stairs: no      Advanced directives: has Living Will. Discussed. I agree with patient wishes and decision. Advance Care Planning   ACP discussion was held with the patient during this visit. Patient has an advance directive, copy requested.    Co-managers: ophthalmology [facility], dermatology , cardiology Dr.Janet Ocampo, gastroenterologist , urologist       /76 (BP Location: Left arm, Patient Position: Sitting, Cuff Size: Adult)   Resp 14   Ht 162.6 cm (64\")   Wt 65.3 kg (144 lb)   BMI 24.72 kg/m²     Current Outpatient Medications:   •  famotidine (PEPCID) 20 MG/2ML solution injection, , Disp: , Rfl:   •  ALLERGY SERUM INJECTION, Inject 1 mL under the skin Every 30 (Thirty) Days., Disp: , Rfl:   •  aspirin 81 MG EC tablet, Take  by mouth., Disp: , Rfl:   •  Cetirizine HCl 10 MG capsule, Take  by mouth., Disp: , Rfl:   •  Cholecalciferol (VITAMIN D3) 5000 UNITS capsule capsule, Take 1,000 Units by mouth daily., Disp: , Rfl:   •  Cranberry (HM CRANBERRY SUPER STRENGTH) 300 MG tablet, Take 300 mg by mouth Daily., Disp: , Rfl:   •  fluticasone (FLONASE) 50 MCG/ACT nasal spray, , Disp: , Rfl:   •  isosorbide mononitrate (IMDUR) 30 MG 24 hr tablet, TAKE 1 TABLET EVERY DAY, Disp: 90 tablet, Rfl: 1  •  lisinopril (PRINIVIL,ZESTRIL) 2.5 MG tablet, TAKE 1 TABLET EVERY DAY, Disp: 90 tablet, Rfl: 3  •  " "Magnesium Oxide 400 MG capsule, Take  by mouth., Disp: , Rfl:   •  metoprolol tartrate (LOPRESSOR) 25 MG tablet, TAKE 1 TABLET TWICE DAILY  ( DOSE INCREASE  ), Disp: 180 tablet, Rfl: 3  •  Multiple Vitamin (MULTI VITAMIN DAILY PO), Take  by mouth., Disp: , Rfl:   •  nitrofurantoin, macrocrystal-monohydrate, (MACROBID) 100 MG capsule, Take 1 capsule by mouth 2 (Two) Times a Day., Disp: 14 capsule, Rfl: 0  •  Omega-3 Fatty Acids (FISH OIL) 1200 MG capsule capsule, Take  by mouth., Disp: , Rfl:   •  PROAIR  (90 Base) MCG/ACT inhaler, , Disp: , Rfl:   •  Probiotic Product (PROBIOTIC-10) capsule, , Disp: , Rfl:   •  rosuvastatin (CRESTOR) 10 MG tablet, TAKE 1 TABLET EVERY DAY, Disp: 90 tablet, Rfl: 3  •  traZODone (DESYREL) 100 MG tablet, TAKE 1 TABLET EVERY NIGHT, Disp: 90 tablet, Rfl: 3    Patient has been diagnosed with hyperlipidemia. Target LDL is < 70 mg/dl (\"very high\" risk for CHD). Patient is on low fat diet with good compliance. Patient is compliant with treatment: daily use of Crestor (rozuvastatin). Side effects of medication: none. Exercise used to exercise regularly, but had not been lately due to poo weather.    Colleen Lovell 69 y.o. female is here for chronic insomnia f/u. Patient had been suffering from insomnia for years. The major issue had been frequent night time awakening. Contributing factors include nocturia. She has to get up almost every 3 hours at night to urinate. Patient had tried Trazodone. Patient has found some relief while taking medication..   Patient is compliant with medication.   Colleen Lovell has no history of substance abuse.                      Patient has a history of multidrug-resistant E. coli urinary tract infection.  Had been treated with fosfomycin in September 2019 with clearing of the bacteria from the urine.  She was found to have a urinary tract infection on the routine urinalysis.  Patient reports that at the time of urinalysis she had no symptoms, but in " a day or 2 had re-developed febrile illness with urgency, frequency of urination and burning on urination and lower abdominal discomfort.  She called the office and was treated with ciprofloxacin, even that your urinary culture came back with multidrug-resistant E. coli, resistant to quinolones.  Patient had called your urologist and describes situation to him, but was advised to finish course of ciprofloxacin and to come into his office for straight cath urinalysis and culture.  Patient reports that she is still very uncomfortable has to take antispasmodics over-the-counter all the time.  She had finished a week of Cipro, but her condition had not improved.  Patient has appointment in urologist office on March 3, which is about 10 days from now.                                    The following portions of the patient's history were reviewed and updated as appropriate: allergies, current medications, past family history, past medical history, past social history, past surgical history and problem list.    Review of Systems   Constitutional: Negative for chills and fever.   HENT: Negative for postnasal drip, sinus pressure and sore throat.    Eyes: Negative for pain and itching.   Respiratory: Negative for cough and chest tightness.    Cardiovascular: Negative for chest pain and leg swelling.   Gastrointestinal: Positive for abdominal pain. Negative for blood in stool.   Endocrine: Negative for cold intolerance and heat intolerance.   Genitourinary: Positive for difficulty urinating and frequency. Negative for flank pain.   Musculoskeletal: Negative for back pain and neck pain.   Skin: Negative for color change and rash.   Neurological: Negative for dizziness, weakness and headaches.   Hematological: Negative for adenopathy. Does not bruise/bleed easily.   Psychiatric/Behavioral: Negative for sleep disturbance. The patient is not nervous/anxious.        Objective   Physical Exam   Constitutional: She is oriented to  person, place, and time. Vital signs are normal. She appears well-developed and well-nourished. No distress.   HENT:   Head: Normocephalic and atraumatic.   Right Ear: External ear normal.   Left Ear: External ear normal.   Nose: Nose normal. No mucosal edema. Right sinus exhibits no maxillary sinus tenderness and no frontal sinus tenderness. Left sinus exhibits no maxillary sinus tenderness and no frontal sinus tenderness.   Mouth/Throat: Oropharynx is clear and moist. No oropharyngeal exudate.   Eyes: Pupils are equal, round, and reactive to light. Conjunctivae, EOM and lids are normal. Right eye exhibits no discharge. Left eye exhibits no discharge. Right conjunctiva is not injected. Left conjunctiva is not injected. No scleral icterus. Right eye exhibits normal extraocular motion. Left eye exhibits normal extraocular motion.   Neck: Normal range of motion and full passive range of motion without pain. Neck supple. No JVD present. Carotid bruit is not present. No thyromegaly present.   Cardiovascular: Normal rate, regular rhythm, S1 normal, S2 normal, normal heart sounds and intact distal pulses. PMI is not displaced. Exam reveals no gallop and no friction rub.   No murmur heard.  Pulmonary/Chest: Effort normal and breath sounds normal. No accessory muscle usage. No respiratory distress. She has no decreased breath sounds. She has no wheezes. She has no rhonchi. She has no rales.   Abdominal: Soft. Bowel sounds are normal. She exhibits no distension, no pulsatile liver, no fluid wave, no abdominal bruit, no ascites and no mass. There is no tenderness. There is no rebound and no guarding.   Musculoskeletal: She exhibits no edema or deformity.   Lymphadenopathy:        Head (right side): No submental, no submandibular, no preauricular, no posterior auricular and no occipital adenopathy present.        Head (left side): No submental, no submandibular, no tonsillar, no preauricular, no posterior auricular and no  occipital adenopathy present.     She has no cervical adenopathy.        Right cervical: No superficial cervical, no deep cervical and no posterior cervical adenopathy present.       Left cervical: No superficial cervical, no deep cervical and no posterior cervical adenopathy present.        Right: No supraclavicular adenopathy present.        Left: No supraclavicular adenopathy present.   Neurological: She is alert and oriented to person, place, and time. She has normal strength and normal reflexes. She displays normal reflexes. No cranial nerve deficit. She exhibits normal muscle tone. Coordination normal.   Reflex Scores:       Bicep reflexes are 2+ on the right side and 2+ on the left side.       Patellar reflexes are 2+ on the right side and 2+ on the left side.  Skin: Skin is warm and dry. No rash noted. She is not diaphoretic. No erythema.   Psychiatric: She has a normal mood and affect. Her speech is normal and behavior is normal. Thought content normal.   Vitals reviewed.      Assessment/Plan   Colleen was seen today for medicare wellness-subsequent and urinary tract infection.    Diagnoses and all orders for this visit:    Health care maintenance    Urinary tract infection due to extended-spectrum beta lactamase (ESBL) producing Escherichia coli  -     nitrofurantoin, macrocrystal-monohydrate, (MACROBID) 100 MG capsule; Take 1 capsule by mouth 2 (Two) Times a Day.    Post-menopause  -     DEXA Bone Density Axial; Future    Visit for screening mammogram  -     Mammo Screening Digital Tomosynthesis Bilateral With CAD; Future        Annual wellness visit with preventive exam as well as age and risk appropriate councelling completed.    Cardiovascular disease councelling: current. Recommended: Continue statin., Excellent cholesterol.  Diabetes screening and councelling: current. Recommended: Low carb diet recommended. Needs to avoid starches and sweets.  Breast cancer screening: up to date. Recommended every  year..  Osteoporosis screening: is due, will schedule. Benefits explained..  Glaucoma screening: up to date.   AAA screening: not needed..  Hep C screening (born between 8483-1800) completed..  Colorectal cancer screening: up to date. Recommended every 5 years. Next screening is recommended in 03/2020, will refer...    Immunizations:   1. Influenza vaccine  is up to date and recommended yearly.   2. Pneumococcal vaccines: completed.   3. Shingles prevention:  Zostavax completed, recommended to get new shingles vaccine Shingrix at the pharmacy, benefits explained.      Advanced directives planning: has Living Will. Discussed. I agree with patient wishes and decision..I had asked the patient to provide us with copy of her Living Will.    Medications reviewed and medication list updated.   Potential harmful drug-disease interactions in the elderly:statin and impaired fasting blood sugar.  High risk medication in elderly: none  ASA use: continue daily ASA 81 mg.    Urinary tract infection due to multidrug-resistant E. coli-patient has a history of frequent urinary tract infections, including multidrug resistant E. coli in September 2020 (successfully treated with fosfomycin at that time with clearing of the bacteria from your urine, as it was documented by urine culture)-as patient is symptomatic in spite of taking quinolone for a week, we will tailor her treatment according to the sensitivity to nitrofurantoin, documented per urine culture.  We will treat your with Macrobid twice a day for a week, then she will have a straight cath urinalysis and culture done in your urologist office.  I will reevaluate patient in a months.  Advised to call us if your urinary symptoms persist.  Insomnia - patient had been suffering from chronic insomnia and and had failed OTC options. Recommended daily exercise. and Advised to avoid caffeinated beverages in the evening. Will continue Trazodone.    Colleen Lovell will be under my  "care with regular visits for the reassessment of the treatment.  Hyperlipidemia - well controlled with statin. Normal liver function tests. LDL target is below < 70 mg/dl (\"very high\" risk for CHD). Patient's 69. Continue same treatment. Regular exercise recommended.  Impaired fasting blood sugars. Low carb diet and regular exercise recommended.  Nocturia - will try Myrbetriq 25 mg at bedtime. Samples given, will reevaluate in a month.     "

## 2020-02-29 DIAGNOSIS — I10 BENIGN ESSENTIAL HYPERTENSION: ICD-10-CM

## 2020-02-29 DIAGNOSIS — I25.810 CORONARY ARTERY DISEASE INVOLVING AUTOLOGOUS ARTERY CORONARY BYPASS GRAFT WITHOUT ANGINA PECTORIS: ICD-10-CM

## 2020-02-29 DIAGNOSIS — E78.00 PURE HYPERCHOLESTEROLEMIA: ICD-10-CM

## 2020-02-29 DIAGNOSIS — F51.01 PRIMARY INSOMNIA: ICD-10-CM

## 2020-03-02 RX ORDER — TRAZODONE HYDROCHLORIDE 100 MG/1
TABLET ORAL
Qty: 90 TABLET | Refills: 3 | Status: SHIPPED | OUTPATIENT
Start: 2020-03-02 | End: 2020-09-23 | Stop reason: SDUPTHER

## 2020-03-02 RX ORDER — LISINOPRIL 2.5 MG/1
TABLET ORAL
Qty: 90 TABLET | Refills: 3 | Status: SHIPPED | OUTPATIENT
Start: 2020-03-02 | End: 2020-09-23 | Stop reason: SDUPTHER

## 2020-03-02 RX ORDER — ROSUVASTATIN CALCIUM 10 MG/1
TABLET, COATED ORAL
Qty: 90 TABLET | Refills: 3 | Status: SHIPPED | OUTPATIENT
Start: 2020-03-02 | End: 2020-09-23 | Stop reason: SDUPTHER

## 2020-03-11 ENCOUNTER — PREP FOR SURGERY (OUTPATIENT)
Dept: OTHER | Facility: HOSPITAL | Age: 70
End: 2020-03-11

## 2020-03-11 DIAGNOSIS — Z86.010 HX OF COLONIC POLYPS: Primary | ICD-10-CM

## 2020-03-12 ENCOUNTER — TRANSCRIBE ORDERS (OUTPATIENT)
Dept: ADMINISTRATIVE | Facility: HOSPITAL | Age: 70
End: 2020-03-12

## 2020-03-12 DIAGNOSIS — R31.9 URINARY TRACT INFECTION WITH HEMATURIA, SITE UNSPECIFIED: Primary | ICD-10-CM

## 2020-03-12 DIAGNOSIS — N39.0 URINARY TRACT INFECTION WITH HEMATURIA, SITE UNSPECIFIED: Primary | ICD-10-CM

## 2020-03-13 ENCOUNTER — HOSPITAL ENCOUNTER (OUTPATIENT)
Dept: GENERAL RADIOLOGY | Facility: HOSPITAL | Age: 70
Discharge: HOME | End: 2020-03-13
Admitting: UROLOGY

## 2020-03-13 VITALS
SYSTOLIC BLOOD PRESSURE: 109 MMHG | HEART RATE: 67 BPM | HEIGHT: 64 IN | OXYGEN SATURATION: 97 % | RESPIRATION RATE: 18 BRPM | DIASTOLIC BLOOD PRESSURE: 65 MMHG | WEIGHT: 150 LBS | TEMPERATURE: 97 F | BODY MASS INDEX: 25.61 KG/M2

## 2020-03-13 DIAGNOSIS — N39.0 URINARY TRACT INFECTION WITH HEMATURIA, SITE UNSPECIFIED: ICD-10-CM

## 2020-03-13 DIAGNOSIS — R31.9 URINARY TRACT INFECTION WITH HEMATURIA, SITE UNSPECIFIED: ICD-10-CM

## 2020-03-13 PROCEDURE — 25010000003 LIDOCAINE 1 % SOLUTION: Performed by: RADIOLOGY

## 2020-03-13 PROCEDURE — 25010000002 ERTAPENEM 1 GM/100ML SOLUTION: Performed by: UROLOGY

## 2020-03-13 PROCEDURE — C1751 CATH, INF, PER/CENT/MIDLINE: HCPCS

## 2020-03-13 PROCEDURE — 96365 THER/PROPH/DIAG IV INF INIT: CPT

## 2020-03-13 RX ORDER — LIDOCAINE HYDROCHLORIDE 10 MG/ML
10 INJECTION, SOLUTION INFILTRATION; PERINEURAL ONCE
Status: COMPLETED | OUTPATIENT
Start: 2020-03-13 | End: 2020-03-13

## 2020-03-13 RX ADMIN — LIDOCAINE HYDROCHLORIDE 5 ML: 10 INJECTION, SOLUTION INFILTRATION; PERINEURAL at 13:50

## 2020-03-13 RX ADMIN — ERTAPENEM SODIUM 1 G: 1 INJECTION, POWDER, LYOPHILIZED, FOR SOLUTION INTRAMUSCULAR; INTRAVENOUS at 14:35

## 2020-03-13 NOTE — NURSING NOTE
Back  From PICC insertion, right arm single lumen good blood return, flushes easily.  First dose ertapenem 1 gram started.

## 2020-03-13 NOTE — NURSING NOTE
Patient called out for nurse that there was blood under the picc line. Dressing had to be changed due to a large blood clot noted. Using sterile technique the dressing was changed as so was the stat lock. Ambulated to car per self. No c/o itching from IV antibiotic. Good blood return noted from picc after dressing changed and flushes easily with 10 cc NS.

## 2020-03-13 NOTE — DISCHARGE INSTRUCTIONS
Ertapenem injection  What is this medicine?  ERTAPENEM (er ta PEN em) is a carbapenem antibiotic. It is used to treat certain kinds of bacterial infections. It will not work for colds, flu, or other viral infections.  This medicine may be used for other purposes; ask your health care provider or pharmacist if you have questions.  COMMON BRAND NAME(S): Invanz  What should I tell my health care provider before I take this medicine?  They need to know if you have any of these conditions:  -brain tumor, lesion  -kidney disease  -seizure disorder  -an unusual or allergic reaction to ertapenem, other antibiotics, amide local anesthetics like lidocaine, or other medicines, foods, dyes, or preservatives  -pregnant or trying to get pregnant  -breast-feeding  How should I use this medicine?  This medicine is infused into a vein or injected deep into a muscle. It is usually given by a health care professional in a hospital or clinic setting.  If you get this medicine at home, you will be taught how to prepare and give this medicine. Use exactly as directed. Take your medicine at regular intervals. Do not take your medicine more often than directed.  It is important that you put your used needles and syringes in a special sharps container. Do not put them in a trash can. If you do not have a sharps container, call your pharmacist or healthcare provider to get one.  Talk to your pediatrician regarding the use of this medicine in children. While this drug may be prescribed for children as young as 3 months old for selected conditions, precautions do apply.  Overdosage: If you think you have taken too much of this medicine contact a poison control center or emergency room at once.  NOTE: This medicine is only for you. Do not share this medicine with others.  What if I miss a dose?  If you miss a dose, take it as soon as you can. If it is almost time for your next dose, take only that dose. Do not take double or extra  doses.  What may interact with this medicine?  -birth control pills  -probenecid  This list may not describe all possible interactions. Give your health care provider a list of all the medicines, herbs, non-prescription drugs, or dietary supplements you use. Also tell them if you smoke, drink alcohol, or use illegal drugs. Some items may interact with your medicine.  What should I watch for while using this medicine?  Tell your doctor or health care professional if your symptoms do not improve or if you get new symptoms. Your doctor will monitor your condition and blood work as needed.  Do not treat diarrhea with over the counter products. Contact your doctor if you have diarrhea that lasts more than 2 days or if it is severe and watery.  What side effects may I notice from receiving this medicine?  Side effects that you should report to your doctor or health care professional as soon as possible:  -allergic reactions like skin rash, itching or hives, swelling of the face, lips, or tongue  -anxiety, confusion, dizzy  -chest pain  -difficulty breathing, wheezing  -edema, swelling  -fever  -irregular heart rate, blood pressure  -pain or difficulty passing urine  -seizures  -unusually weak or tired  -white or red patches in mouth  Side effects that usually do not require medical attention (report to your doctor or health care professional if they continue or are bothersome):  -constipation or diarrhea  -difficulty sleeping  -headache  -nausea, vomiting  -pain, swelling or irritation where injected  -stomach upset  -vaginal itch, irritation  This list may not describe all possible side effects. Call your doctor for medical advice about side effects. You may report side effects to FDA at 2-531-FDA-5080.  Where should I keep my medicine?  Keep out of the reach of children.  You will be instructed on how to store this medicine. Throw away any unused medicine after the expiration date on the label.  NOTE: This sheet is a  summary. It may not cover all possible information. If you have questions about this medicine, talk to your doctor, pharmacist, or health care provider.  © 2020 [employer] (2014-07-25 07:36:44)

## 2020-03-13 NOTE — NURSING NOTE
Infusion complete, PICC flushed, covered with netting for comfort and safety.  Keeping pt for 30 min to assess for possible drug rxn post first infusion of abx; may leave at 1515 if no problem.  Home health to bring supplies etc to pt home.

## 2020-03-16 NOTE — PATIENT INSTRUCTIONS
"Call Dr. Gerson Turcios @ [phone] if you have any problems or concerns.    We know you have a Choice in healthcare and appreciate you using [facility].  Our purpose is to provide you \"Excellent Care\".  We hope that you will always choose us in the future and continue to recommend us to your family and friends.      "

## 2020-03-20 ENCOUNTER — HOSPITAL ENCOUNTER (OUTPATIENT)
Dept: INFUSION THERAPY | Facility: HOSPITAL | Age: 70
Discharge: HOME | End: 2020-03-20
Admitting: UROLOGY

## 2020-03-20 VITALS
SYSTOLIC BLOOD PRESSURE: 102 MMHG | DIASTOLIC BLOOD PRESSURE: 72 MMHG | HEART RATE: 78 BPM | OXYGEN SATURATION: 97 % | RESPIRATION RATE: 16 BRPM

## 2020-03-20 DIAGNOSIS — Z45.2 PICC (PERIPHERALLY INSERTED CENTRAL CATHETER) IN PLACE: ICD-10-CM

## 2020-03-20 DIAGNOSIS — N39.0 URINARY TRACT INFECTION WITHOUT HEMATURIA, SITE UNSPECIFIED: Primary | ICD-10-CM

## 2020-03-20 PROCEDURE — G0463 HOSPITAL OUTPT CLINIC VISIT: HCPCS

## 2020-03-20 NOTE — NURSING NOTE
NURSING PROGRESS NOTE: Patient arrived to [facility] at 1000 for PICC dressing change. Right upper arm PICC in place.  Area surrounding site is very ecchymotic.    Zero exposed.  Dressing changed per Policy. See flow sheet for documentation.  Tolerated procedure without incident.  Discharged home at 1030. RANJITH Higgins

## 2020-03-27 ENCOUNTER — HOSPITAL ENCOUNTER (OUTPATIENT)
Dept: INFUSION THERAPY | Facility: HOSPITAL | Age: 70
Discharge: HOME | End: 2020-03-27
Admitting: UROLOGY

## 2020-03-27 VITALS
HEART RATE: 72 BPM | DIASTOLIC BLOOD PRESSURE: 61 MMHG | SYSTOLIC BLOOD PRESSURE: 92 MMHG | TEMPERATURE: 97.1 F | OXYGEN SATURATION: 96 %

## 2020-03-27 DIAGNOSIS — Z45.2 PICC (PERIPHERALLY INSERTED CENTRAL CATHETER) IN PLACE: ICD-10-CM

## 2020-03-27 DIAGNOSIS — N39.0 URINARY TRACT INFECTION WITHOUT HEMATURIA, SITE UNSPECIFIED: Primary | ICD-10-CM

## 2020-03-27 PROCEDURE — G0463 HOSPITAL OUTPT CLINIC VISIT: HCPCS

## 2020-03-27 NOTE — NURSING NOTE
1300 AMBULATORY TO [facility] FOR PICC DRESSING CHANGE. DRESSING CHANGED, 1 CM OUT, SITE CLEAR, SOME BRUISING @ SITE. TOLERATED WITHOUT PROBLEMS, DISCHARGED HOME AMBULATORY WITHOUT C/O.

## 2020-04-03 ENCOUNTER — HOSPITAL ENCOUNTER (OUTPATIENT)
Dept: INFUSION THERAPY | Facility: HOSPITAL | Age: 70
Discharge: HOME | End: 2020-04-03
Admitting: INTERNAL MEDICINE

## 2020-04-03 VITALS
SYSTOLIC BLOOD PRESSURE: 130 MMHG | TEMPERATURE: 97.7 F | OXYGEN SATURATION: 98 % | DIASTOLIC BLOOD PRESSURE: 74 MMHG | HEART RATE: 62 BPM | RESPIRATION RATE: 16 BRPM

## 2020-04-03 DIAGNOSIS — N39.0 URINARY TRACT INFECTION WITHOUT HEMATURIA, SITE UNSPECIFIED: Primary | ICD-10-CM

## 2020-04-03 DIAGNOSIS — Z45.2 PICC (PERIPHERALLY INSERTED CENTRAL CATHETER) IN PLACE: ICD-10-CM

## 2020-04-03 PROCEDURE — G0463 HOSPITAL OUTPT CLINIC VISIT: HCPCS

## 2020-04-03 RX ORDER — NITROFURANTOIN 25; 75 MG/1; MG/1
100 CAPSULE ORAL 2 TIMES DAILY
COMMUNITY
End: 2020-09-23

## 2020-04-03 NOTE — NURSING NOTE
1000 Patient ambulatory to [facility] for PICC removal. Patient lied back in recliner flat for removal. Site clear, some ecchymosis remains @ site. Patient remained flat in recliner for 30 minutes after removal as per policy. AVS printed & reviewed with pt. Tolerated procedure without problems. Discharged ambulatory @ 1100 without c/o.

## 2020-04-03 NOTE — PATIENT INSTRUCTIONS
Call DR. CYNTHIA ANDERSON @ [phone]   PICC Removal, Adult, Care After  This sheet gives you information about how to care for yourself after your procedure. Your health care provider may also give you more specific instructions. If you have problems or questions, contact your health care provider.  What can I expect after the procedure?  After your procedure, it is common to have:  · Tenderness or soreness.  · Redness, swelling, or a scab where the PICC was removed (exit site).  Follow these instructions at home:  For the first 24 hours after the procedure    · Keep the bandage (dressing) on the exit site clean and dry. Do not remove the dressing until your health care provider tells you to do so.  · Check your arm often for signs and symptoms of an infection. Check for:  ? A red streak that spreads away from the dressing.  ? Blood or fluid that you can see on the dressing.  ? More redness or swelling.  · Do not lift anything heavy or do activities that require great effort until your health care provider says it is okay. You should avoid:  ? Lifting weights.  ? Yard work.  ? Any physical activity with repetitive arm movement.  · Watch closely for any signs of an air bubble in the vein (air embolism). This is a rare but serious complication. If you have signs of air embolism, call 911 immediately and lie down on your left side to keep the air from moving into the lungs. Signs of an air embolism include:  ? Difficulty breathing.  ? Chest pain.  ? Coughing or wheezing.  ? Skin that is pale, blue, cold, or clammy.  ? Rapid pulse.  ? Rapid breathing.  ? Fainting.  After 24 Hours have passed:  · Remove your dressing as told by your health care provider. Make sure you wash your hands with soap and water before and after you change the dressing. If soap and water are not available, use hand .  · Return to your normal activities as told by your health care provider.  · A small scab may develop over the  exit site. Do not pick at the scab.  · When bathing or showering, gently wash the exit site with soap and water. Pat it dry.  · Watch for signs of infection, such as:  ? Fever or chills.  ? Swollen glands under the arm.  ? More redness, swelling, or soreness in the arm.  ? Blood, fluid, or pus coming from the exit site.  ? Warmth or a bad smell at the exit site.  ? A red streak spreading away from the exit site.  General instructions  · Take over-the-counter and prescription medicines only as told by your health care provider. Do not take any new medicines without checking with your health care provider first.  · If you were prescribed an antibiotic medicine, apply or take it as told by your health care provider. Do not stop using the antibiotic even if your condition improves.  · Keep all follow-up visits as told by your health care provider. This is important.  Contact a health care provider if:  · You have a fever or chills.  · You have soreness, redness, or swelling on your exit site, and it gets worse.  · You have swollen glands under your arm.  · You have any of the following symptoms at your exit site:  ? Blood, fluid, or pus.  ? Unusual warmth.  ? A bad smell.  ? A red streak spreading away from the exit site.  Get help right away if:  · You have numbness or tingling in your fingers, hand, or arm.  · Your arm looks blue and feels cold.  · You have signs of an air embolism, such as:  ? Difficulty breathing.  ? Chest pain.  ? Coughing or wheezing.  ? Skin that is pale, blue, cold, or clammy.  ? Rapid pulse.  ? Rapid breathing.  ? Fainting.  These symptoms may represent a serious problem that is an emergency. Do not wait to see if the symptoms will go away. Get medical help right away. Call your local emergency services (911 in the [place] Do not drive yourself to the hospital.  Summary  · After your procedure, it is common to have tenderness or soreness, redness, swelling, or a scab at the exit site.  · Keep  "the dressing over the exit site clean and dry. Do not remove the dressing until your health care provider tells you to do so.  · Do not lift anything heavy or do activities that require great effort until your health care provider says it is okay.  · Watch closely for any signs of an air embolism. If you have signs of air embolism, call 911 immediately and lie down on your left side.  This information is not intended to replace advice given to you by your health care provider. Make sure you discuss any questions you have with your health care provider.  Document Released: 12/23/2014 Document Revised: 02/13/2018 Document Reviewed: 02/13/2018  [employer] Patient Education © 2020 [employer]   if you have any problems or concerns.    We know you have a Choice in healthcare and appreciate you using [facility].  Our purpose is to provide you \"Excellent Care\".  We hope that you will always choose us in the future and continue to recommend us to your family and friends.                "

## 2020-04-10 ENCOUNTER — APPOINTMENT (OUTPATIENT)
Dept: INFUSION THERAPY | Facility: HOSPITAL | Age: 70
End: 2020-04-10

## 2020-04-17 ENCOUNTER — APPOINTMENT (OUTPATIENT)
Dept: INFUSION THERAPY | Facility: HOSPITAL | Age: 70
End: 2020-04-17

## 2020-06-22 ENCOUNTER — OUTSIDE FACILITY SERVICE (OUTPATIENT)
Dept: GASTROENTEROLOGY | Facility: CLINIC | Age: 70
End: 2020-06-22

## 2020-06-22 PROCEDURE — G0105 COLORECTAL SCRN; HI RISK IND: HCPCS | Performed by: INTERNAL MEDICINE

## 2020-06-24 ENCOUNTER — TELEPHONE (OUTPATIENT)
Dept: INTERNAL MEDICINE | Facility: CLINIC | Age: 70
End: 2020-06-24

## 2020-06-25 DIAGNOSIS — I25.810 CORONARY ARTERY DISEASE INVOLVING AUTOLOGOUS ARTERY CORONARY BYPASS GRAFT WITHOUT ANGINA PECTORIS: ICD-10-CM

## 2020-06-25 RX ORDER — ISOSORBIDE MONONITRATE 30 MG/1
TABLET, EXTENDED RELEASE ORAL
Qty: 90 TABLET | Refills: 1 | Status: SHIPPED | OUTPATIENT
Start: 2020-06-25 | End: 2020-09-23 | Stop reason: SDUPTHER

## 2020-07-13 NOTE — TELEPHONE ENCOUNTER
PT CALLED IN STATING THAT HER  DRISS QUINONES IS A CURRENT PT OF DR BUCIO'S AND WAS IN THE OFFICE TO SEE HER TODAY.  DRISS TOLD WIFE THAT DR SAID SHE WOULD SEE PT AS A NEW PT BUT MY DOCUMENTS SAID SHE IS NOT CURRENTLY ACCEPTING NEW PTS AT THIS TIME.  PT WANTS TO KNOW IF SHE CAN BE SEEN BY DR BUCIO OR IF SHE NEEDS TO FIND ANOTHER PCP TO CARE FOR HER.      PT CALL BACK  [phone]

## 2020-07-14 NOTE — TELEPHONE ENCOUNTER
Patient has been scheduled for end of September as she requested. New patient packet will be mailed.

## 2020-09-23 ENCOUNTER — OFFICE VISIT (OUTPATIENT)
Dept: INTERNAL MEDICINE | Facility: CLINIC | Age: 70
End: 2020-09-23

## 2020-09-23 VITALS
HEIGHT: 64 IN | WEIGHT: 150 LBS | SYSTOLIC BLOOD PRESSURE: 114 MMHG | OXYGEN SATURATION: 99 % | DIASTOLIC BLOOD PRESSURE: 60 MMHG | HEART RATE: 58 BPM | RESPIRATION RATE: 16 BRPM | BODY MASS INDEX: 25.61 KG/M2 | TEMPERATURE: 97.5 F

## 2020-09-23 DIAGNOSIS — R73.01 IMPAIRED FASTING GLUCOSE: ICD-10-CM

## 2020-09-23 DIAGNOSIS — N39.0 URINARY TRACT INFECTION, RECURRENT: ICD-10-CM

## 2020-09-23 DIAGNOSIS — E78.00 PURE HYPERCHOLESTEROLEMIA: ICD-10-CM

## 2020-09-23 DIAGNOSIS — M70.61 TROCHANTERIC BURSITIS OF BOTH HIPS: ICD-10-CM

## 2020-09-23 DIAGNOSIS — I25.810 CORONARY ARTERY DISEASE INVOLVING AUTOLOGOUS ARTERY CORONARY BYPASS GRAFT WITHOUT ANGINA PECTORIS: Primary | ICD-10-CM

## 2020-09-23 DIAGNOSIS — Z86.010 HISTORY OF ADENOMATOUS POLYP OF COLON: ICD-10-CM

## 2020-09-23 DIAGNOSIS — Z78.0 POSTMENOPAUSAL: ICD-10-CM

## 2020-09-23 DIAGNOSIS — Z00.00 HEALTH CARE MAINTENANCE: ICD-10-CM

## 2020-09-23 DIAGNOSIS — M85.80 OSTEOPENIA, UNSPECIFIED LOCATION: ICD-10-CM

## 2020-09-23 DIAGNOSIS — J30.89 NON-SEASONAL ALLERGIC RHINITIS, UNSPECIFIED TRIGGER: ICD-10-CM

## 2020-09-23 DIAGNOSIS — M70.62 TROCHANTERIC BURSITIS OF BOTH HIPS: ICD-10-CM

## 2020-09-23 DIAGNOSIS — I10 BENIGN ESSENTIAL HYPERTENSION: ICD-10-CM

## 2020-09-23 DIAGNOSIS — K21.9 LARYNGOPHARYNGEAL REFLUX: ICD-10-CM

## 2020-09-23 DIAGNOSIS — F51.01 PRIMARY INSOMNIA: ICD-10-CM

## 2020-09-23 DIAGNOSIS — I10 ESSENTIAL HYPERTENSION: ICD-10-CM

## 2020-09-23 PROBLEM — B96.29 URINARY TRACT INFECTION DUE TO EXTENDED-SPECTRUM BETA LACTAMASE (ESBL) PRODUCING ESCHERICHIA COLI: Status: RESOLVED | Noted: 2019-09-19 | Resolved: 2020-09-23

## 2020-09-23 PROBLEM — Z16.12 URINARY TRACT INFECTION DUE TO EXTENDED-SPECTRUM BETA LACTAMASE (ESBL) PRODUCING ESCHERICHIA COLI: Status: RESOLVED | Noted: 2019-09-19 | Resolved: 2020-09-23

## 2020-09-23 PROCEDURE — 99214 OFFICE O/P EST MOD 30 MIN: CPT | Performed by: FAMILY MEDICINE

## 2020-09-23 RX ORDER — ESTRADIOL 0.1 MG/G
2 CREAM VAGINAL 3 TIMES WEEKLY
COMMUNITY
End: 2022-10-10 | Stop reason: SDUPTHER

## 2020-09-23 RX ORDER — MELOXICAM 15 MG/1
15 TABLET ORAL DAILY
Qty: 30 TABLET | Refills: 1 | Status: SHIPPED | OUTPATIENT
Start: 2020-09-23 | End: 2021-03-22

## 2020-09-23 RX ORDER — LANSOPRAZOLE 15 MG/1
15 CAPSULE, DELAYED RELEASE ORAL DAILY
COMMUNITY
End: 2021-03-22 | Stop reason: SDUPTHER

## 2020-09-23 RX ORDER — ROSUVASTATIN CALCIUM 10 MG/1
10 TABLET, COATED ORAL DAILY
Qty: 90 TABLET | Refills: 3 | Status: SHIPPED | OUTPATIENT
Start: 2020-09-23 | End: 2021-10-27

## 2020-09-23 RX ORDER — LISINOPRIL 2.5 MG/1
2.5 TABLET ORAL DAILY
Qty: 90 TABLET | Refills: 3 | Status: SHIPPED | OUTPATIENT
Start: 2020-09-23 | End: 2021-08-16

## 2020-09-23 RX ORDER — ISOSORBIDE MONONITRATE 30 MG/1
30 TABLET, EXTENDED RELEASE ORAL DAILY
Qty: 90 TABLET | Refills: 3 | Status: SHIPPED | OUTPATIENT
Start: 2020-09-23 | End: 2021-08-16

## 2020-09-23 RX ORDER — CEPHALEXIN 250 MG/1
250 CAPSULE ORAL DAILY
COMMUNITY
End: 2021-03-22

## 2020-09-23 RX ORDER — TRAZODONE HYDROCHLORIDE 100 MG/1
100 TABLET ORAL NIGHTLY
Qty: 90 TABLET | Refills: 3 | Status: SHIPPED | OUTPATIENT
Start: 2020-09-23 | End: 2021-10-27

## 2020-09-23 NOTE — PROGRESS NOTES
Colleen Lovell is a 70 y.o. female, who presents with a chief complaint of   Chief Complaint   Patient presents with   • Hyperlipidemia   • Elevated Hepatic Enzymes   • Urinary Tract Infection     chronic -sees Dr. Hartman, uro   • Establish Care       HPI     Pt here to establish care.    1.  CAD.  She had CABG X 3 at age 46.  She is feeling well and tolerating rosuvastatin, isosorbide, ASA, beta blocker, ace-inhibitor.  She sees Dr. Rebecca Ocampo.    2. Recurrent UTI.  She sees Dr. Turcios.  She reports that she has not had an UTIs since starting vaginal estrogen cream and daily cephalexin.  The plan is to stop the cephalexin after 3 months.    3. History of colon polyps.  She had a normal colonoscopy this summer in June by Dr. Coyle, who recommends 5 year repeat colonoscopy d/t past hx adenomatous polyps.    4. Bilateral hip pain for a few weeks.  Pain and tenderness on the outside of both hips.  She wakes up with pain on the side that she is sleeping on and has to roll to the other side.    The following portions of the patient's history were reviewed and updated as appropriate: allergies, current medications, past family history, past medical history, past social history, past surgical history and problem list.    Allergies: Patient has no known allergies.    Review of Systems   Constitutional: Negative.    HENT: Negative.    Eyes: Negative.    Respiratory: Negative.    Cardiovascular: Negative.    Gastrointestinal: Negative.    Endocrine: Negative.    Genitourinary: Positive for frequency.   Musculoskeletal: Positive for arthralgias.   Skin: Negative.    Allergic/Immunologic: Positive for environmental allergies.   Neurological: Negative.    Hematological: Negative.    Psychiatric/Behavioral: Positive for sleep disturbance.             Wt Readings from Last 3 Encounters:   09/23/20 68 kg (150 lb)   03/13/20 68 kg (150 lb)   02/24/20 65.3 kg (144 lb)     Temp Readings from Last 3 Encounters:    09/23/20 97.5 °F (36.4 °C) (Temporal)   04/03/20 97.7 °F (36.5 °C) (Temporal)   03/27/20 97.1 °F (36.2 °C) (Temporal)     BP Readings from Last 3 Encounters:   09/23/20 114/60   04/03/20 130/74   03/27/20 92/61     Pulse Readings from Last 3 Encounters:   09/23/20 58   04/03/20 62   03/27/20 72     Body mass index is 25.75 kg/m².  @LASTSAO2(3)@    Physical Exam  Vitals signs and nursing note reviewed.   Constitutional:       Appearance: Normal appearance.   HENT:      Head: Normocephalic and atraumatic.      Nose: Nose normal.      Mouth/Throat:      Mouth: Mucous membranes are moist.   Eyes:      Extraocular Movements: Extraocular movements intact.      Conjunctiva/sclera: Conjunctivae normal.   Neck:      Musculoskeletal: Neck supple. No neck rigidity.   Cardiovascular:      Rate and Rhythm: Normal rate and regular rhythm.      Heart sounds: No murmur.   Pulmonary:      Effort: Pulmonary effort is normal.      Breath sounds: Normal breath sounds.   Musculoskeletal:         General: Tenderness (bilateral greater trochanteric region) present.      Right lower leg: No edema.      Left lower leg: No edema.   Skin:     General: Skin is warm and dry.   Neurological:      General: No focal deficit present.      Mental Status: She is alert and oriented to person, place, and time.   Psychiatric:         Mood and Affect: Mood normal.         Behavior: Behavior normal.             Colleen was seen today for hyperlipidemia, elevated hepatic enzymes, urinary tract infection and establish care.    Diagnoses and all orders for this visit:    Coronary artery disease involving autologous artery coronary bypass graft without angina pectoris  -     Comprehensive Metabolic Panel  -     Lipid Panel With / Chol / HDL Ratio  -     lisinopril (PRINIVIL,ZESTRIL) 2.5 MG tablet; Take 1 tablet by mouth Daily.  -     isosorbide mononitrate (IMDUR) 30 MG 24 hr tablet; Take 1 tablet by mouth Daily.  -     metoprolol tartrate (LOPRESSOR) 25 MG  tablet; Take 1 tablet by mouth 2 (Two) Times a Day.  -     rosuvastatin (CRESTOR) 10 MG tablet; Take 1 tablet by mouth Daily.    Non-seasonal allergic rhinitis, unspecified trigger    Laryngopharyngeal reflux  -     CBC & Differential    Impaired fasting glucose  -     Hemoglobin A1c  -     Vitamin B12    Osteopenia, unspecified location  -     DEXA Bone Density Axial    Urinary tract infection, recurrent    Primary insomnia  -     traZODone (DESYREL) 100 MG tablet; Take 1 tablet by mouth Every Night.    History of adenomatous polyp of colon    Health care maintenance    Trochanteric bursitis of both hips  -     meloxicam (MOBIC) 15 MG tablet; Take 1 tablet by mouth Daily. Take with food.    Postmenopausal  -     DEXA Bone Density Axial    Essential hypertension  -     TSH    Benign essential hypertension  -     lisinopril (PRINIVIL,ZESTRIL) 2.5 MG tablet; Take 1 tablet by mouth Daily.    Pure hypercholesterolemia      1. CAD.  Doing well.  On ASA, isosorbide, lisinopril, metoprolol, rosuvastatin.  Per Dr. Rebecca Ocampo.    2. MAILE. Controlled with cetirizine and fluticasone nasal spray.    3. GERD.  Controlled with OTC lansoprazole and lifestyle measures.    4. Impaired fasting glucose.  Check A1c.  Lifestyle measures.    5. Osteopenia.  She takes vitamin D but not calcium.  Due for repeat dexa scan.  Weight bearing exercise.    6. Recurrent UTI.  She is doing well with vaginal estrogen and daily cephalexin per Dr. Turcios.    7. Insomnia.  Continue trazodone.  Doing well with this and lifestyle measures.    8. History of colon polyp.  Had colonoscopy this summer by Dr. Coyle.  Next due in 5 years, 6/2025.    9. Trochanteric bursitis, bilateral.  Trial of meloxicam and break from daily walking.    10. Routine health maint.  Pneumococcal vaccines UTD.  Shingrix and Tdap at pharmacy.  Flu vaccine declined.  Mammogram pending next month.    Outpatient Medications Prior to Visit   Medication Sig Dispense Refill    • aspirin 81 MG EC tablet Take  by mouth.     • cephalexin (KEFLEX) 250 MG capsule Take 250 mg by mouth Daily.     • Cetirizine HCl 10 MG capsule Take  by mouth.     • Cholecalciferol (VITAMIN D3) 5000 UNITS capsule capsule Take 1,000 Units by mouth daily.     • estradiol (ESTRACE) 0.1 MG/GM vaginal cream Insert 2 g into the vagina 3 (Three) Times a Week.     • fluticasone (FLONASE) 50 MCG/ACT nasal spray      • lansoprazole (PREVACID) 15 MG capsule Take 15 mg by mouth Daily.     • Magnesium Oxide 400 MG capsule Take  by mouth.     • Multiple Vitamin (MULTI VITAMIN DAILY PO) Take  by mouth.     • Omega-3 Fatty Acids (FISH OIL) 1200 MG capsule capsule Take  by mouth.     • PROAIR  (90 Base) MCG/ACT inhaler      • Probiotic Product (PROBIOTIC-10) capsule      • isosorbide mononitrate (IMDUR) 30 MG 24 hr tablet TAKE 1 TABLET EVERY DAY 90 tablet 1   • lisinopril (PRINIVIL,ZESTRIL) 2.5 MG tablet TAKE 1 TABLET EVERY DAY 90 tablet 3   • metoprolol tartrate (LOPRESSOR) 25 MG tablet TAKE 1 TABLET TWICE DAILY 180 tablet 3   • rosuvastatin (CRESTOR) 10 MG tablet TAKE 1 TABLET EVERY DAY 90 tablet 3   • traZODone (DESYREL) 100 MG tablet TAKE 1 TABLET EVERY NIGHT 90 tablet 3   • Cranberry (HM CRANBERRY SUPER STRENGTH) 300 MG tablet Take 300 mg by mouth Daily.     • Mirabegron ER (MYRBETRIQ) 25 MG tablet sustained-release 24 hour 24 hr tablet Take 1 tablet by mouth Daily. 90 tablet 3   • nitrofurantoin, macrocrystal-monohydrate, (MACROBID) 100 MG capsule Take 100 mg by mouth 2 (Two) Times a Day.       No facility-administered medications prior to visit.      New Medications Ordered This Visit   Medications   • meloxicam (MOBIC) 15 MG tablet     Sig: Take 1 tablet by mouth Daily. Take with food.     Dispense:  30 tablet     Refill:  1   • lisinopril (PRINIVIL,ZESTRIL) 2.5 MG tablet     Sig: Take 1 tablet by mouth Daily.     Dispense:  90 tablet     Refill:  3   • isosorbide mononitrate (IMDUR) 30 MG 24 hr tablet     Sig:  Take 1 tablet by mouth Daily.     Dispense:  90 tablet     Refill:  3   • metoprolol tartrate (LOPRESSOR) 25 MG tablet     Sig: Take 1 tablet by mouth 2 (Two) Times a Day.     Dispense:  180 tablet     Refill:  3   • traZODone (DESYREL) 100 MG tablet     Sig: Take 1 tablet by mouth Every Night.     Dispense:  90 tablet     Refill:  3   • rosuvastatin (CRESTOR) 10 MG tablet     Sig: Take 1 tablet by mouth Daily.     Dispense:  90 tablet     Refill:  3     [unfilled]  Medications Discontinued During This Encounter   Medication Reason   • Mirabegron ER (MYRBETRIQ) 25 MG tablet sustained-release 24 hour 24 hr tablet *Therapy completed   • Cranberry (HM CRANBERRY SUPER STRENGTH) 300 MG tablet *Therapy completed   • nitrofurantoin, macrocrystal-monohydrate, (MACROBID) 100 MG capsule *Therapy completed   • rosuvastatin (CRESTOR) 10 MG tablet Reorder   • lisinopril (PRINIVIL,ZESTRIL) 2.5 MG tablet Reorder   • metoprolol tartrate (LOPRESSOR) 25 MG tablet Reorder   • traZODone (DESYREL) 100 MG tablet Reorder   • isosorbide mononitrate (IMDUR) 30 MG 24 hr tablet Reorder         Return in about 6 months (around 3/23/2021).

## 2020-09-24 LAB
ALBUMIN SERPL-MCNC: 4.6 G/DL (ref 3.5–5.2)
ALBUMIN/GLOB SERPL: 2.2 G/DL
ALP SERPL-CCNC: 86 U/L (ref 39–117)
ALT SERPL-CCNC: 20 U/L (ref 1–33)
AST SERPL-CCNC: 23 U/L (ref 1–32)
BASOPHILS # BLD AUTO: 0.08 10*3/MM3 (ref 0–0.2)
BASOPHILS NFR BLD AUTO: 1.2 % (ref 0–1.5)
BILIRUB SERPL-MCNC: 1 MG/DL (ref 0–1.2)
BUN SERPL-MCNC: 10 MG/DL (ref 8–23)
BUN/CREAT SERPL: 13 (ref 7–25)
CALCIUM SERPL-MCNC: 9.1 MG/DL (ref 8.6–10.5)
CHLORIDE SERPL-SCNC: 104 MMOL/L (ref 98–107)
CHOLEST SERPL-MCNC: 152 MG/DL (ref 0–200)
CHOLEST/HDLC SERPL: 2.49 {RATIO}
CO2 SERPL-SCNC: 24.3 MMOL/L (ref 22–29)
CREAT SERPL-MCNC: 0.77 MG/DL (ref 0.57–1)
EOSINOPHIL # BLD AUTO: 0.2 10*3/MM3 (ref 0–0.4)
EOSINOPHIL NFR BLD AUTO: 3 % (ref 0.3–6.2)
ERYTHROCYTE [DISTWIDTH] IN BLOOD BY AUTOMATED COUNT: 12.6 % (ref 12.3–15.4)
GLOBULIN SER CALC-MCNC: 2.1 GM/DL
GLUCOSE SERPL-MCNC: 98 MG/DL (ref 65–99)
HBA1C MFR BLD: 6 % (ref 4.8–5.6)
HCT VFR BLD AUTO: 43 % (ref 34–46.6)
HDLC SERPL-MCNC: 61 MG/DL (ref 40–60)
HGB BLD-MCNC: 14.2 G/DL (ref 12–15.9)
IMM GRANULOCYTES # BLD AUTO: 0.01 10*3/MM3 (ref 0–0.05)
IMM GRANULOCYTES NFR BLD AUTO: 0.1 % (ref 0–0.5)
LDLC SERPL CALC-MCNC: 71 MG/DL (ref 0–100)
LYMPHOCYTES # BLD AUTO: 2.11 10*3/MM3 (ref 0.7–3.1)
LYMPHOCYTES NFR BLD AUTO: 31.6 % (ref 19.6–45.3)
MCH RBC QN AUTO: 29.6 PG (ref 26.6–33)
MCHC RBC AUTO-ENTMCNC: 33 G/DL (ref 31.5–35.7)
MCV RBC AUTO: 89.8 FL (ref 79–97)
MONOCYTES # BLD AUTO: 0.51 10*3/MM3 (ref 0.1–0.9)
MONOCYTES NFR BLD AUTO: 7.6 % (ref 5–12)
NEUTROPHILS # BLD AUTO: 3.76 10*3/MM3 (ref 1.7–7)
NEUTROPHILS NFR BLD AUTO: 56.5 % (ref 42.7–76)
NRBC BLD AUTO-RTO: 0 /100 WBC (ref 0–0.2)
PLATELET # BLD AUTO: 297 10*3/MM3 (ref 140–450)
POTASSIUM SERPL-SCNC: 4.7 MMOL/L (ref 3.5–5.2)
PROT SERPL-MCNC: 6.7 G/DL (ref 6–8.5)
RBC # BLD AUTO: 4.79 10*6/MM3 (ref 3.77–5.28)
SODIUM SERPL-SCNC: 140 MMOL/L (ref 136–145)
TRIGL SERPL-MCNC: 98 MG/DL (ref 0–150)
TSH SERPL DL<=0.005 MIU/L-ACNC: 2.17 UIU/ML (ref 0.27–4.2)
VIT B12 SERPL-MCNC: 441 PG/ML (ref 211–946)
VLDLC SERPL CALC-MCNC: 19.6 MG/DL
WBC # BLD AUTO: 6.67 10*3/MM3 (ref 3.4–10.8)

## 2020-10-14 ENCOUNTER — APPOINTMENT (OUTPATIENT)
Dept: WOMENS IMAGING | Facility: HOSPITAL | Age: 70
End: 2020-10-14

## 2020-10-14 PROCEDURE — 77067 SCR MAMMO BI INCL CAD: CPT | Performed by: RADIOLOGY

## 2020-10-14 PROCEDURE — 77063 BREAST TOMOSYNTHESIS BI: CPT | Performed by: RADIOLOGY

## 2021-03-16 DIAGNOSIS — R73.01 IMPAIRED FASTING GLUCOSE: ICD-10-CM

## 2021-03-16 DIAGNOSIS — I10 BENIGN ESSENTIAL HYPERTENSION: ICD-10-CM

## 2021-03-16 DIAGNOSIS — Z11.59 NEED FOR HEPATITIS C SCREENING TEST: Primary | ICD-10-CM

## 2021-03-16 DIAGNOSIS — E78.2 MIXED HYPERLIPIDEMIA: ICD-10-CM

## 2021-03-16 DIAGNOSIS — Z79.899 HIGH RISK MEDICATION USE: ICD-10-CM

## 2021-03-17 ENCOUNTER — LAB (OUTPATIENT)
Dept: INTERNAL MEDICINE | Facility: CLINIC | Age: 71
End: 2021-03-17

## 2021-03-17 DIAGNOSIS — Z00.00 HEALTH CARE MAINTENANCE: ICD-10-CM

## 2021-03-17 DIAGNOSIS — I10 BENIGN ESSENTIAL HYPERTENSION: Primary | ICD-10-CM

## 2021-03-17 DIAGNOSIS — Z11.59 NEED FOR HEPATITIS C SCREENING TEST: ICD-10-CM

## 2021-03-17 DIAGNOSIS — R73.01 IMPAIRED FASTING GLUCOSE: ICD-10-CM

## 2021-03-17 DIAGNOSIS — E78.2 MIXED HYPERLIPIDEMIA: ICD-10-CM

## 2021-03-17 DIAGNOSIS — Z79.899 HIGH RISK MEDICATION USE: ICD-10-CM

## 2021-03-18 LAB
ALBUMIN SERPL-MCNC: 4.5 G/DL (ref 3.5–5.2)
ALBUMIN/GLOB SERPL: 2 G/DL
ALP SERPL-CCNC: 86 U/L (ref 39–117)
ALT SERPL-CCNC: 20 U/L (ref 1–33)
AST SERPL-CCNC: 26 U/L (ref 1–32)
BASOPHILS # BLD AUTO: NORMAL 10*3/UL
BASOPHILS # BLD MANUAL: 0.12 10*3/MM3 (ref 0–0.2)
BASOPHILS NFR BLD MANUAL: 2.1 % (ref 0–1.5)
BILIRUB SERPL-MCNC: 1.2 MG/DL (ref 0–1.2)
BUN SERPL-MCNC: 8 MG/DL (ref 8–23)
BUN/CREAT SERPL: 10.3 (ref 7–25)
CALCIUM SERPL-MCNC: 9.4 MG/DL (ref 8.6–10.5)
CHLORIDE SERPL-SCNC: 105 MMOL/L (ref 98–107)
CHOLEST SERPL-MCNC: 147 MG/DL (ref 0–200)
CHOLEST/HDLC SERPL: 2.63 {RATIO}
CO2 SERPL-SCNC: 25.1 MMOL/L (ref 22–29)
CREAT SERPL-MCNC: 0.78 MG/DL (ref 0.57–1)
DIFFERENTIAL COMMENT: ABNORMAL
EOSINOPHIL # BLD AUTO: NORMAL 10*3/UL
EOSINOPHIL # BLD MANUAL: 0.23 10*3/MM3 (ref 0–0.4)
EOSINOPHIL NFR BLD AUTO: NORMAL %
EOSINOPHIL NFR BLD MANUAL: 4.1 % (ref 0.3–6.2)
ERYTHROCYTE [DISTWIDTH] IN BLOOD BY AUTOMATED COUNT: 12.5 % (ref 12.3–15.4)
GLOBULIN SER CALC-MCNC: 2.3 GM/DL
GLUCOSE SERPL-MCNC: 97 MG/DL (ref 65–99)
HBA1C MFR BLD: 5.9 % (ref 4.8–5.6)
HCT VFR BLD AUTO: 42 % (ref 34–46.6)
HCV AB S/CO SERPL IA: <0.1 S/CO RATIO (ref 0–0.9)
HDLC SERPL-MCNC: 56 MG/DL (ref 40–60)
HGB BLD-MCNC: 14.1 G/DL (ref 12–15.9)
LDLC SERPL CALC-MCNC: 71 MG/DL (ref 0–100)
LYMPHOCYTES # BLD AUTO: NORMAL 10*3/UL
LYMPHOCYTES # BLD MANUAL: 1.74 10*3/MM3 (ref 0.7–3.1)
LYMPHOCYTES NFR BLD AUTO: NORMAL %
LYMPHOCYTES NFR BLD MANUAL: 30.9 % (ref 19.6–45.3)
MCH RBC QN AUTO: 30.2 PG (ref 26.6–33)
MCHC RBC AUTO-ENTMCNC: 33.6 G/DL (ref 31.5–35.7)
MCV RBC AUTO: 89.9 FL (ref 79–97)
MONOCYTES # BLD MANUAL: 0.23 10*3/MM3 (ref 0.1–0.9)
MONOCYTES NFR BLD AUTO: NORMAL %
MONOCYTES NFR BLD MANUAL: 4.1 % (ref 5–12)
NEUTROPHILS # BLD MANUAL: 3.32 10*3/MM3 (ref 1.7–7)
NEUTROPHILS NFR BLD AUTO: NORMAL %
NEUTROPHILS NFR BLD MANUAL: 58.8 % (ref 42.7–76)
PLATELET # BLD AUTO: 269 10*3/MM3 (ref 140–450)
PLATELET BLD QL SMEAR: ABNORMAL
POTASSIUM SERPL-SCNC: 4.6 MMOL/L (ref 3.5–5.2)
PROT SERPL-MCNC: 6.8 G/DL (ref 6–8.5)
RBC # BLD AUTO: 4.67 10*6/MM3 (ref 3.77–5.28)
RBC MORPH BLD: ABNORMAL
SODIUM SERPL-SCNC: 139 MMOL/L (ref 136–145)
T4 FREE SERPL-MCNC: 1.08 NG/DL (ref 0.93–1.7)
TRIGL SERPL-MCNC: 109 MG/DL (ref 0–150)
TSH SERPL DL<=0.005 MIU/L-ACNC: 3.4 UIU/ML (ref 0.27–4.2)
VLDLC SERPL CALC-MCNC: 20 MG/DL (ref 5–40)
WBC # BLD AUTO: 5.64 10*3/MM3 (ref 3.4–10.8)

## 2021-03-22 ENCOUNTER — OFFICE VISIT (OUTPATIENT)
Dept: INTERNAL MEDICINE | Facility: CLINIC | Age: 71
End: 2021-03-22

## 2021-03-22 VITALS
RESPIRATION RATE: 16 BRPM | OXYGEN SATURATION: 98 % | HEIGHT: 64 IN | TEMPERATURE: 97.1 F | SYSTOLIC BLOOD PRESSURE: 110 MMHG | BODY MASS INDEX: 26.12 KG/M2 | DIASTOLIC BLOOD PRESSURE: 60 MMHG | WEIGHT: 153 LBS | HEART RATE: 66 BPM

## 2021-03-22 DIAGNOSIS — Z00.00 HEALTH CARE MAINTENANCE: ICD-10-CM

## 2021-03-22 DIAGNOSIS — M85.80 OSTEOPENIA, UNSPECIFIED LOCATION: ICD-10-CM

## 2021-03-22 DIAGNOSIS — Z78.0 POSTMENOPAUSAL: Primary | ICD-10-CM
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
## 2021-03-22 DIAGNOSIS — Z77.21 EXPOSURE TO BODY FLUIDS BY CONTAMINATED HYPODERMIC NEEDLE STICK: Primary | ICD-10-CM

## 2021-03-22 DIAGNOSIS — Z86.010 HISTORY OF ADENOMATOUS POLYP OF COLON: ICD-10-CM

## 2021-03-22 DIAGNOSIS — R73.03 PREDIABETES: ICD-10-CM

## 2021-03-22 DIAGNOSIS — Z78.0 POSTMENOPAUSAL: ICD-10-CM

## 2021-03-22 DIAGNOSIS — I25.810 CORONARY ARTERY DISEASE INVOLVING AUTOLOGOUS ARTERY CORONARY BYPASS GRAFT WITHOUT ANGINA PECTORIS: Primary | ICD-10-CM

## 2021-03-22 DIAGNOSIS — N39.0 URINARY TRACT INFECTION, RECURRENT: ICD-10-CM

## 2021-03-22 DIAGNOSIS — K21.9 LARYNGOPHARYNGEAL REFLUX: ICD-10-CM

## 2021-03-22 DIAGNOSIS — R06.02 SHORTNESS OF BREATH: ICD-10-CM

## 2021-03-22 DIAGNOSIS — F51.01 PRIMARY INSOMNIA: ICD-10-CM

## 2021-03-22 DIAGNOSIS — J30.89 NON-SEASONAL ALLERGIC RHINITIS, UNSPECIFIED TRIGGER: ICD-10-CM

## 2021-03-22 PROCEDURE — 99214 OFFICE O/P EST MOD 30 MIN: CPT | Performed by: FAMILY MEDICINE

## 2021-03-22 RX ORDER — LANSOPRAZOLE 30 MG/1
30 CAPSULE, DELAYED RELEASE ORAL DAILY
Qty: 90 CAPSULE | Refills: 3 | Status: SHIPPED | OUTPATIENT
Start: 2021-03-22 | End: 2021-09-23

## 2021-03-22 NOTE — PROGRESS NOTES
Colleen Lovell is a 70 y.o. female, who presents with a chief complaint of   Chief Complaint   Patient presents with   • Coronary Artery Disease   • Prediabetes   • Insomnia   • Hyperlipidemia       Coronary Artery Disease  Symptoms include shortness of breath. Risk factors include hyperlipidemia.   Insomnia  Associated symptoms include arthralgias.   Hyperlipidemia  Associated symptoms include shortness of breath.        1.  CAD.  She had CABG X 3 at age 46.  Coronary catheterization 10/2020 showed occlusion of LAD diagonal, with the plan to treat medically.  She feels short of breath with exertion.  Tolerating rosuvastatin, isosorbide, ASA, beta blocker, ace-inhibitor.  She sees Dr. Rebecca Ocampo.    2. Recurrent UTI.  She sees Dr. Turcios.  She reports that she has not had an UTIs since starting vaginal estrogen cream and daily cephalexin.  The plan is to stop the cephalexin after 3 months.    3. History of colon polyps.  She had a normal colonoscopy last summer in June by Dr. Coyle, who recommends 5 year repeat colonoscopy d/t past hx adenomatous polyps.      The following portions of the patient's history were reviewed and updated as appropriate: allergies, current medications, past family history, past medical history, past social history, past surgical history and problem list.    Allergies: Patient has no known allergies.    Review of Systems   Constitutional: Negative.    HENT: Negative.    Eyes: Negative.    Respiratory: Positive for shortness of breath and wheezing.    Cardiovascular: Negative.    Gastrointestinal: Negative.    Endocrine: Negative.    Genitourinary: Positive for frequency.   Musculoskeletal: Positive for arthralgias.   Skin: Negative.    Allergic/Immunologic: Positive for environmental allergies.   Neurological: Negative.    Hematological: Negative.    Psychiatric/Behavioral: Positive for sleep disturbance. The patient has insomnia.              Wt Readings from Last 3  Encounters:   03/22/21 69.4 kg (153 lb)   09/23/20 68 kg (150 lb)   03/13/20 68 kg (150 lb)     Temp Readings from Last 3 Encounters:   03/22/21 97.1 °F (36.2 °C) (Temporal)   09/23/20 97.5 °F (36.4 °C) (Temporal)   04/03/20 97.7 °F (36.5 °C) (Temporal)     BP Readings from Last 3 Encounters:   03/22/21 110/60   09/23/20 114/60   04/03/20 130/74     Pulse Readings from Last 3 Encounters:   03/22/21 66   09/23/20 58   04/03/20 62     Body mass index is 26.26 kg/m².  @LASTSAO2(3)@    Physical Exam  Vitals and nursing note reviewed.   Constitutional:       Appearance: Normal appearance.   HENT:      Head: Normocephalic and atraumatic.      Nose: Nose normal.      Mouth/Throat:      Mouth: Mucous membranes are moist.   Eyes:      Extraocular Movements: Extraocular movements intact.      Conjunctiva/sclera: Conjunctivae normal.   Cardiovascular:      Rate and Rhythm: Normal rate and regular rhythm.      Heart sounds: No murmur heard.     Pulmonary:      Effort: Pulmonary effort is normal.      Breath sounds: Normal breath sounds.   Musculoskeletal:         General: Tenderness (bilateral greater trochanteric region) present.      Cervical back: Neck supple. No rigidity.      Right lower leg: No edema.      Left lower leg: No edema.   Skin:     General: Skin is warm and dry.   Neurological:      General: No focal deficit present.      Mental Status: She is alert and oriented to person, place, and time.   Psychiatric:         Mood and Affect: Mood normal.         Behavior: Behavior normal.             Diagnoses and all orders for this visit:    1. Coronary artery disease involving autologous artery coronary bypass graft without angina pectoris (Primary)  -     CBC & Differential; Future  -     Lipid Panel With / Chol / HDL Ratio; Future  -     TSH; Future    2. Non-seasonal allergic rhinitis, unspecified trigger    3. Laryngopharyngeal reflux  -     lansoprazole (PREVACID) 30 MG capsule; Take 1 capsule by mouth Daily.   Dispense: 90 capsule; Refill: 3    4. Prediabetes  -     Comprehensive Metabolic Panel; Future  -     Lipid Panel With / Chol / HDL Ratio; Future  -     TSH; Future  -     Hemoglobin A1c; Future  -     Vitamin B12; Future    5. Osteopenia, unspecified location  -     DEXA Bone Density Axial  -     TSH; Future    6. Urinary tract infection, recurrent    7. Primary insomnia    8. History of adenomatous polyp of colon  -     CBC & Differential; Future    9. Health care maintenance    10. Postmenopausal  -     DEXA Bone Density Axial    11. Shortness of breath  -     Full Pulmonary Function Test Without Bronchodilator; Future      1. CAD.  Short of breath, which may be multifactorial; see #10.  On ASA, isosorbide, lisinopril, metoprolol, rosuvastatin.  Per Dr. Rebecca Ocampo.    2. MAILE. Controlled with cetirizine and fluticasone nasal spray.  Still with cough, mild wheezing, PND.  Check PFTs.  Consider adding montelukast and/or daily controller inhaler.    3. GERD.  Not optimally controlled with OTC lansoprazole and lifestyle measures.  Increase lansoprazole to 30 mg daily.    4. Prediabetes.  A1c 5.9.  Continue lifestyle measures.    5. Osteopenia.  She takes vitamin D but not calcium.  Due for repeat dexa scan.  Weight bearing exercise.    6. Recurrent UTI.  She is doing well with vaginal estrogen and daily cephalexin per Dr. Turcios.    7. Insomnia.  Continue trazodone.  Doing well with this and lifestyle measures.    8. History of colon polyp.  Had colonoscopy last summer by Dr. Coyle.  Next due in 5 years, 6/2025.    9. Routine health maint.  Pneumococcal vaccines UTD.  Shingrix and Tdap at pharmacy.  Flu vaccine declined.  Mammogram pending next month.  Covid-19 vaccine done.    10. Shortness of breath.  Likely multifactorial d/t CAD, asthma (has history of this in the past, not currently treated, allergies flared), reflux.  Check PFTs.  May need montelukast or inhaler.    Outpatient Medications Prior to  Visit   Medication Sig Dispense Refill   • aspirin 81 MG EC tablet Take  by mouth.     • Cetirizine HCl 10 MG capsule Take  by mouth.     • Cholecalciferol (VITAMIN D3) 5000 UNITS capsule capsule Take 1,000 Units by mouth daily.     • estradiol (ESTRACE) 0.1 MG/GM vaginal cream Insert 2 g into the vagina 3 (Three) Times a Week.     • fluticasone (FLONASE) 50 MCG/ACT nasal spray      • isosorbide mononitrate (IMDUR) 30 MG 24 hr tablet Take 1 tablet by mouth Daily. 90 tablet 3   • lisinopril (PRINIVIL,ZESTRIL) 2.5 MG tablet Take 1 tablet by mouth Daily. 90 tablet 3   • metoprolol tartrate (LOPRESSOR) 25 MG tablet Take 1 tablet by mouth 2 (Two) Times a Day. 180 tablet 3   • Multiple Vitamin (MULTI VITAMIN DAILY PO) Take  by mouth.     • Omega-3 Fatty Acids (FISH OIL) 1200 MG capsule capsule Take  by mouth.     • PROAIR  (90 Base) MCG/ACT inhaler      • Probiotic Product (PROBIOTIC-10) capsule      • rosuvastatin (CRESTOR) 10 MG tablet Take 1 tablet by mouth Daily. 90 tablet 3   • traZODone (DESYREL) 100 MG tablet Take 1 tablet by mouth Every Night. 90 tablet 3   • lansoprazole (PREVACID) 15 MG capsule Take 15 mg by mouth Daily.     • cephalexin (KEFLEX) 250 MG capsule Take 250 mg by mouth Daily.     • Magnesium Oxide 400 MG capsule Take  by mouth.     • meloxicam (MOBIC) 15 MG tablet Take 1 tablet by mouth Daily. Take with food. 30 tablet 1     No facility-administered medications prior to visit.     New Medications Ordered This Visit   Medications   • lansoprazole (PREVACID) 30 MG capsule     Sig: Take 1 capsule by mouth Daily.     Dispense:  90 capsule     Refill:  3     [unfilled]  Medications Discontinued During This Encounter   Medication Reason   • meloxicam (MOBIC) 15 MG tablet    • cephalexin (KEFLEX) 250 MG capsule    • lansoprazole (PREVACID) 15 MG capsule Reorder   • Magnesium Oxide 400 MG capsule          Return in about 6 months (around 9/22/2021).

## 2021-03-23 ENCOUNTER — TRANSCRIBE ORDERS (OUTPATIENT)
Dept: ADMINISTRATIVE | Facility: HOSPITAL | Age: 71
End: 2021-03-23

## 2021-03-23 ENCOUNTER — LAB (OUTPATIENT)
Dept: INTERNAL MEDICINE | Facility: CLINIC | Age: 71
End: 2021-03-23
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
## 2021-03-23 DIAGNOSIS — Z01.818 OTHER SPECIFIED PRE-OPERATIVE EXAMINATION: Primary | ICD-10-CM

## 2021-03-23 DIAGNOSIS — Z77.21 EXPOSURE TO BODY FLUIDS BY CONTAMINATED HYPODERMIC NEEDLE STICK: ICD-10-CM

## 2021-03-24 LAB — HIV 1+2 AB+HIV1 P24 AG SERPL QL IA: NON REACTIVE

## 2021-04-06 ENCOUNTER — LAB (OUTPATIENT)
Dept: LAB | Facility: HOSPITAL | Age: 71
End: 2021-04-06

## 2021-04-06 DIAGNOSIS — Z01.818 OTHER SPECIFIED PRE-OPERATIVE EXAMINATION: ICD-10-CM

## 2021-04-06 LAB — SARS-COV-2 RNA PNL SPEC NAA+PROBE: NOT DETECTED

## 2021-04-06 PROCEDURE — 87635 SARS-COV-2 COVID-19 AMP PRB: CPT | Performed by: OBSTETRICS & GYNECOLOGY

## 2021-04-06 PROCEDURE — C9803 HOPD COVID-19 SPEC COLLECT: HCPCS

## 2021-04-08 ENCOUNTER — HOSPITAL ENCOUNTER (OUTPATIENT)
Dept: PULMONOLOGY | Facility: HOSPITAL | Age: 71
Discharge: HOME | End: 2021-04-08

## 2021-04-08 ENCOUNTER — APPOINTMENT (OUTPATIENT)
Dept: BONE DENSITY | Facility: HOSPITAL | Age: 71
End: 2021-04-08

## 2021-04-08 DIAGNOSIS — R06.02 SHORTNESS OF BREATH: ICD-10-CM

## 2021-04-08 DIAGNOSIS — Z78.0 POSTMENOPAUSAL: ICD-10-CM

## 2021-04-08 PROCEDURE — 77080 DXA BONE DENSITY AXIAL: CPT

## 2021-04-08 PROCEDURE — 94729 DIFFUSING CAPACITY: CPT

## 2021-04-08 PROCEDURE — 94060 EVALUATION OF WHEEZING: CPT

## 2021-04-08 PROCEDURE — 94726 PLETHYSMOGRAPHY LUNG VOLUMES: CPT

## 2021-04-08 RX ORDER — ALBUTEROL SULFATE 90 UG/1
4 AEROSOL, METERED RESPIRATORY (INHALATION) ONCE
Status: COMPLETED | OUTPATIENT
Start: 2021-04-08 | End: 2021-04-08

## 2021-04-08 RX ADMIN — ALBUTEROL SULFATE 4 PUFF: 90 AEROSOL, METERED RESPIRATORY (INHALATION) at 11:00

## 2021-04-14 ENCOUNTER — OFFICE VISIT (OUTPATIENT)
Dept: INTERNAL MEDICINE | Facility: CLINIC | Age: 71
End: 2021-04-14

## 2021-04-14 VITALS
WEIGHT: 156 LBS | SYSTOLIC BLOOD PRESSURE: 118 MMHG | OXYGEN SATURATION: 99 % | BODY MASS INDEX: 26.63 KG/M2 | HEART RATE: 72 BPM | RESPIRATION RATE: 16 BRPM | TEMPERATURE: 96.9 F | DIASTOLIC BLOOD PRESSURE: 70 MMHG | HEIGHT: 64 IN

## 2021-04-14 DIAGNOSIS — J45.40 MODERATE PERSISTENT ASTHMA WITHOUT COMPLICATION: ICD-10-CM

## 2021-04-14 DIAGNOSIS — M81.0 AGE-RELATED OSTEOPOROSIS WITHOUT CURRENT PATHOLOGICAL FRACTURE: Primary | ICD-10-CM

## 2021-04-14 DIAGNOSIS — J30.89 NON-SEASONAL ALLERGIC RHINITIS, UNSPECIFIED TRIGGER: ICD-10-CM

## 2021-04-14 PROCEDURE — 99214 OFFICE O/P EST MOD 30 MIN: CPT | Performed by: FAMILY MEDICINE

## 2021-04-14 RX ORDER — MONTELUKAST SODIUM 10 MG/1
10 TABLET ORAL NIGHTLY
Qty: 90 TABLET | Refills: 1 | Status: SHIPPED | OUTPATIENT
Start: 2021-04-14 | End: 2021-09-23

## 2021-04-14 RX ORDER — ALENDRONATE SODIUM 70 MG/1
70 TABLET ORAL
Qty: 12 TABLET | Refills: 3 | Status: SHIPPED | OUTPATIENT
Start: 2021-04-14 | End: 2021-09-23

## 2021-04-14 RX ORDER — FLUTICASONE PROPIONATE AND SALMETEROL XINAFOATE 115; 21 UG/1; UG/1
2 AEROSOL, METERED RESPIRATORY (INHALATION)
Qty: 36 G | Refills: 3 | Status: SHIPPED | OUTPATIENT
Start: 2021-04-14 | End: 2021-06-03

## 2021-04-14 NOTE — PROGRESS NOTES
Subjective   Colleen Lovell is a 70 y.o. female presenting today for follow up of   Chief Complaint   Patient presents with   • Osteoporosis     Dexa results   • Shortness of Breath     PFT result       History of Present Illness     Pt presents to f/u on recent tests.    1. Osteoporosis.  Pt previously had a diagnosis of osteopenia.  Recent dexa scan showed osteoporosis in the spine with a 13% decrease since last test.  Pt says she took Fosamax years ago and tolerated it well.    2. Asthma.  PFTs were done d/t shortness of breath.  Results showed moderate obstruction with significant improvement post bronchodilator.  Pt has a history of asthma but hasn't been on medication for years.  She was exposed to second hand smoke as a child.  She continues to report shortness of breath and occasional wheezing and cough.    3. Allergies.  Pt takes Flonase and cetirizine but is having breakthrough symptoms, including postnasal drip, cough.  She says she has taken Singulair in the past with benefit.    Patient Active Problem List   Diagnosis   • Atopic rhinitis   • Herpes simplex type 1 infection   • Impaired fasting glucose   • Insomnia   • Menopausal symptom   • Age-related osteoporosis without current pathological fracture   • Laryngopharyngeal reflux   • Urinary tract infection, recurrent   • Health care maintenance   • CAD (coronary artery disease)   • Benign essential hypertension   • Diastolic dysfunction   • History of adenomatous polyp of colon   • Nocturia   • Prediabetes   • Moderate persistent asthma without complication       Current Outpatient Medications on File Prior to Visit   Medication Sig   • aspirin 81 MG EC tablet Take  by mouth.   • Cetirizine HCl 10 MG capsule Take  by mouth.   • Cholecalciferol (VITAMIN D3) 5000 UNITS capsule capsule Take 1,000 Units by mouth daily.   • estradiol (ESTRACE) 0.1 MG/GM vaginal cream Insert 2 g into the vagina 3 (Three) Times a Week.   • fluticasone (FLONASE) 50 MCG/ACT  "nasal spray    • isosorbide mononitrate (IMDUR) 30 MG 24 hr tablet Take 1 tablet by mouth Daily.   • lansoprazole (PREVACID) 30 MG capsule Take 1 capsule by mouth Daily.   • lisinopril (PRINIVIL,ZESTRIL) 2.5 MG tablet Take 1 tablet by mouth Daily.   • metoprolol tartrate (LOPRESSOR) 25 MG tablet Take 1 tablet by mouth 2 (Two) Times a Day.   • Multiple Vitamin (MULTI VITAMIN DAILY PO) Take  by mouth.   • Omega-3 Fatty Acids (FISH OIL) 1200 MG capsule capsule Take  by mouth.   • PROAIR  (90 Base) MCG/ACT inhaler    • Probiotic Product (PROBIOTIC-10) capsule    • rosuvastatin (CRESTOR) 10 MG tablet Take 1 tablet by mouth Daily.   • traZODone (DESYREL) 100 MG tablet Take 1 tablet by mouth Every Night.     No current facility-administered medications on file prior to visit.          The following portions of the patient's history were reviewed and updated as appropriate: allergies, current medications, past family history, past medical history, past social history, past surgical history and problem list.    Review of Systems   Constitutional: Negative.    HENT: Positive for postnasal drip and rhinorrhea.    Respiratory: Positive for cough, shortness of breath and wheezing.    Allergic/Immunologic: Positive for environmental allergies.   Psychiatric/Behavioral: Negative.        Objective   Vitals:    04/14/21 0858   BP: 118/70   BP Location: Left arm   Patient Position: Sitting   Cuff Size: Adult   Pulse: 72   Resp: 16   Temp: 96.9 °F (36.1 °C)   TempSrc: Temporal   SpO2: 99%   Weight: 70.8 kg (156 lb)   Height: 162.6 cm (64\")       BP Readings from Last 3 Encounters:   04/14/21 118/70   03/22/21 110/60   09/23/20 114/60        Wt Readings from Last 3 Encounters:   04/14/21 70.8 kg (156 lb)   03/22/21 69.4 kg (153 lb)   09/23/20 68 kg (150 lb)        Body mass index is 26.78 kg/m².  Nursing notes and vitals reviewed.    Physical Exam  Vitals and nursing note reviewed.   Constitutional:       Appearance: Normal " appearance.   HENT:      Head: Normocephalic and atraumatic.      Mouth/Throat:      Mouth: Mucous membranes are moist.   Eyes:      Extraocular Movements: Extraocular movements intact.      Conjunctiva/sclera: Conjunctivae normal.   Pulmonary:      Effort: No respiratory distress.      Breath sounds: Normal breath sounds. No wheezing.   Musculoskeletal:      Cervical back: Neck supple. No rigidity.      Right lower leg: No edema.      Left lower leg: No edema.   Skin:     General: Skin is warm and dry.   Neurological:      General: No focal deficit present.      Mental Status: She is alert and oriented to person, place, and time.   Psychiatric:         Mood and Affect: Mood normal.         Behavior: Behavior normal.         Recent Results (from the past 672 hour(s))   HIV-1/O/2 Ag/Ab w Reflex    Collection Time: 03/23/21 10:29 AM    Specimen: Blood   Result Value Ref Range    HIV Screen 4th Gen w/RFX (Reference) Non Reactive Non Reactive   COVID-19,[facility]-HOUSE,Nasal Swab No Transport Media 3-4 HR TAT - Swab, Nasal Cavity    Collection Time: 04/06/21 10:28 AM    Specimen: Nasal Cavity; Swab   Result Value Ref Range    COVID19 Not Detected Not Detected - Ref. Range         Assessment/Plan   Diagnoses and all orders for this visit:    1. Age-related osteoporosis without current pathological fracture (Primary)  -     alendronate (Fosamax) 70 MG tablet; Take 1 tablet by mouth Every 7 (Seven) Days.  Dispense: 12 tablet; Refill: 3    2. Moderate persistent asthma without complication  -     montelukast (SINGULAIR) 10 MG tablet; Take 1 tablet by mouth Every Night.  Dispense: 90 tablet; Refill: 1  -     fluticasone-salmeterol (Advair HFA) 115-21 MCG/ACT inhaler; Inhale 2 puffs 2 (Two) Times a Day.  Dispense: 36 g; Refill: 3    3. Non-seasonal allergic rhinitis, unspecified trigger    1. Osteoporosis.  Treatment options discussed with patient.  Start alendronate and calcium.  Continue vitamin D.  Weight bearing  exercise.  Recheck dexa scan in 2 years.  Anticipatory guidance given.    2. Asthma, moderate persistent.  Start Advair HFA.  Anticipatory guidance given.  Also starting montelukast, see below.    3. Allergic rhinitis.  Not optimally controlled.  Triggering asthma as well.  Continue cetirizine and fluticasone nasal spray.  Add montelukast.          Medications, including side effects, were discussed with the patient. Patient verbalized understanding.  The plan of care was discussed. All questions were answered. Patient verbalized understanding.      Return for Next scheduled follow up.              Answers for HPI/ROS submitted by the patient on 4/13/2021  Please describe your symptoms.: Review test results  Have you had these symptoms before?: Yes  How long have you been having these symptoms?: Greater than 2 weeks  What is the primary reason for your visit?: Other

## 2021-08-14 DIAGNOSIS — I10 BENIGN ESSENTIAL HYPERTENSION: ICD-10-CM

## 2021-08-14 DIAGNOSIS — I25.810 CORONARY ARTERY DISEASE INVOLVING AUTOLOGOUS ARTERY CORONARY BYPASS GRAFT WITHOUT ANGINA PECTORIS: ICD-10-CM

## 2021-08-16 RX ORDER — ISOSORBIDE MONONITRATE 30 MG/1
TABLET, EXTENDED RELEASE ORAL
Qty: 90 TABLET | Refills: 3 | Status: SHIPPED | OUTPATIENT
Start: 2021-08-16 | End: 2022-10-24

## 2021-08-16 RX ORDER — LISINOPRIL 2.5 MG/1
TABLET ORAL
Qty: 90 TABLET | Refills: 3 | Status: SHIPPED | OUTPATIENT
Start: 2021-08-16 | End: 2022-07-07

## 2021-09-23 ENCOUNTER — OFFICE VISIT (OUTPATIENT)
Dept: INTERNAL MEDICINE | Facility: CLINIC | Age: 71
End: 2021-09-23

## 2021-09-23 VITALS
DIASTOLIC BLOOD PRESSURE: 60 MMHG | OXYGEN SATURATION: 98 % | HEIGHT: 64 IN | SYSTOLIC BLOOD PRESSURE: 115 MMHG | RESPIRATION RATE: 16 BRPM | HEART RATE: 82 BPM | BODY MASS INDEX: 26.84 KG/M2 | TEMPERATURE: 97.8 F | WEIGHT: 157.2 LBS

## 2021-09-23 DIAGNOSIS — R73.03 PREDIABETES: ICD-10-CM

## 2021-09-23 DIAGNOSIS — Z86.010 HISTORY OF ADENOMATOUS POLYP OF COLON: ICD-10-CM

## 2021-09-23 DIAGNOSIS — K21.9 LARYNGOPHARYNGEAL REFLUX: ICD-10-CM

## 2021-09-23 DIAGNOSIS — J45.40 MODERATE PERSISTENT ASTHMA WITHOUT COMPLICATION: ICD-10-CM

## 2021-09-23 DIAGNOSIS — M81.0 AGE-RELATED OSTEOPOROSIS WITHOUT CURRENT PATHOLOGICAL FRACTURE: Primary | ICD-10-CM

## 2021-09-23 DIAGNOSIS — I25.810 CORONARY ARTERY DISEASE INVOLVING AUTOLOGOUS ARTERY CORONARY BYPASS GRAFT WITHOUT ANGINA PECTORIS: Primary | ICD-10-CM

## 2021-09-23 DIAGNOSIS — F51.01 PRIMARY INSOMNIA: ICD-10-CM

## 2021-09-23 DIAGNOSIS — N39.0 URINARY TRACT INFECTION, RECURRENT: ICD-10-CM

## 2021-09-23 DIAGNOSIS — Z12.31 ENCOUNTER FOR SCREENING MAMMOGRAM FOR MALIGNANT NEOPLASM OF BREAST: ICD-10-CM

## 2021-09-23 DIAGNOSIS — Z00.00 HEALTH CARE MAINTENANCE: ICD-10-CM

## 2021-09-23 DIAGNOSIS — J30.89 NON-SEASONAL ALLERGIC RHINITIS, UNSPECIFIED TRIGGER: ICD-10-CM

## 2021-09-23 DIAGNOSIS — M81.0 AGE-RELATED OSTEOPOROSIS WITHOUT CURRENT PATHOLOGICAL FRACTURE: ICD-10-CM

## 2021-09-23 PROCEDURE — 99214 OFFICE O/P EST MOD 30 MIN: CPT | Performed by: FAMILY MEDICINE

## 2021-09-23 RX ORDER — LANSOPRAZOLE 15 MG/1
15 CAPSULE, DELAYED RELEASE ORAL 2 TIMES DAILY
COMMUNITY
End: 2022-03-23

## 2021-09-23 NOTE — PROGRESS NOTES
Colleen Lovell is a 71 y.o. female, who presents with a chief complaint of   Chief Complaint   Patient presents with   • Hypertension       Coronary Artery Disease  Risk factors include hyperlipidemia.   Insomnia  Associated symptoms include arthralgias.   Hyperlipidemia    Hypertension       1.  CAD.  She had CABG X 3 at age 46.  Coronary catheterization 10/2020 showed occlusion of LAD diagonal, with the plan to treat medically. Tolerating rosuvastatin, isosorbide, ASA, beta blocker, ace-inhibitor.  She sees Dr. Rebecca Ocampo.    2.Asthma.  We started Advair in April after PFTs showed obstruction with response to bronchodilator.  She had asthma as a child.  Pt reports she is feeling better; the shortness of breath is resolved.    3. Osteoporosis.  She stopped the alendronate due to muscle cramps.    The following portions of the patient's history were reviewed and updated as appropriate: allergies, current medications, past family history, past medical history, past social history, past surgical history and problem list.    Allergies: Patient has no known allergies.    Review of Systems   Constitutional: Negative.    HENT: Negative.    Eyes: Negative.    Respiratory: Negative.    Cardiovascular: Negative.    Gastrointestinal: Negative.    Endocrine: Negative.    Genitourinary: Positive for frequency.   Musculoskeletal: Positive for arthralgias.   Skin: Negative.    Allergic/Immunologic: Positive for environmental allergies.   Neurological: Negative.    Hematological: Negative.    Psychiatric/Behavioral: Positive for sleep disturbance. The patient has insomnia.              Wt Readings from Last 3 Encounters:   09/23/21 71.3 kg (157 lb 3.2 oz)   04/14/21 70.8 kg (156 lb)   03/22/21 69.4 kg (153 lb)     Temp Readings from Last 3 Encounters:   09/23/21 97.8 °F (36.6 °C)   04/14/21 96.9 °F (36.1 °C) (Temporal)   03/22/21 97.1 °F (36.2 °C) (Temporal)     BP Readings from Last 3 Encounters:   09/23/21 115/60    04/14/21 118/70   03/22/21 110/60     Pulse Readings from Last 3 Encounters:   09/23/21 82   04/14/21 72   03/22/21 66     Body mass index is 26.97 kg/m².  @LASTSAO2(3)@    Physical Exam  Vitals and nursing note reviewed.   Constitutional:       Appearance: Normal appearance.   HENT:      Head: Normocephalic and atraumatic.      Nose: Nose normal.      Mouth/Throat:      Mouth: Mucous membranes are moist.   Eyes:      Extraocular Movements: Extraocular movements intact.      Conjunctiva/sclera: Conjunctivae normal.   Cardiovascular:      Rate and Rhythm: Normal rate and regular rhythm.      Heart sounds: No murmur heard.     Pulmonary:      Effort: Pulmonary effort is normal.      Breath sounds: Normal breath sounds.   Musculoskeletal:      Cervical back: Neck supple. No rigidity.      Right lower leg: No edema.      Left lower leg: No edema.   Skin:     General: Skin is warm and dry.   Neurological:      General: No focal deficit present.      Mental Status: She is alert and oriented to person, place, and time.   Psychiatric:         Mood and Affect: Mood normal.         Behavior: Behavior normal.             Diagnoses and all orders for this visit:    1. Coronary artery disease involving autologous artery coronary bypass graft without angina pectoris (Primary)  -     Lipid Panel With / Chol / HDL Ratio; Future    2. Non-seasonal allergic rhinitis, unspecified trigger    3. Laryngopharyngeal reflux    4. Prediabetes  -     Comprehensive Metabolic Panel; Future  -     Hemoglobin A1c; Future  -     Vitamin B12; Future    5. Age-related osteoporosis without current pathological fracture    6. Urinary tract infection, recurrent    7. Primary insomnia    8. History of adenomatous polyp of colon  -     CBC & Differential; Future    9. Moderate persistent asthma without complication    10. Health care maintenance  -     TSH; Future    11. Encounter for screening mammogram for malignant neoplasm of breast  -     Mammo  Screening Bilateral With CAD; Future      1. CAD.  On ASA, isosorbide, lisinopril, metoprolol, rosuvastatin.  Per Dr. Rebecca Ocampo.    2. MAILE. Controlled with cetirizine and fluticasone nasal spray.     3. GERD.  Controlled with lansoprazole 15 mg BID and Tums.    4. Prediabetes.  A1c stable at 5.9.  Continue lifestyle measures.    5. Osteoporosis.  Didn't tolerate alendronate.  Start Prolia.  Next Dexa scan 4/2023.    6. Recurrent UTI.  She is doing well with vaginal estrogen per Dr. Turcios.  She took daily cephalexin for 3 months.    7. Insomnia.  Continue trazodone.  Doing well with this and lifestyle measures.    8. History of colon polyp.  Had colonoscopy June 2020 by Dr. Coyle.  Next due in 5 years, 6/2025.    9. Asthma, moderate persistent.  Controlled with Advair.    10. Routine health maint.  Pneumococcal vaccines UTD.  Shingrix and Tdap at pharmacy.  Flu vaccine at pharmacy.  Mammogram UTD.  Covid-19 vaccine done.      Outpatient Medications Prior to Visit   Medication Sig Dispense Refill   • aspirin 81 MG EC tablet Take  by mouth.     • Cholecalciferol (VITAMIN D3) 5000 UNITS capsule capsule Take 1,000 Units by mouth daily.     • estradiol (ESTRACE) 0.1 MG/GM vaginal cream Insert 2 g into the vagina 3 (Three) Times a Week.     • fluticasone (FLONASE) 50 MCG/ACT nasal spray      • fluticasone-salmeterol (ADVAIR) 250-50 MCG/DOSE DISKUS Inhale 1 puff 2 (Two) Times a Day. 180 each 3   • isosorbide mononitrate (IMDUR) 30 MG 24 hr tablet TAKE 1 TABLET EVERY DAY 90 tablet 3   • lansoprazole (PREVACID) 15 MG capsule Take 15 mg by mouth 2 (two) times a day.     • lisinopril (PRINIVIL,ZESTRIL) 2.5 MG tablet TAKE 1 TABLET EVERY DAY 90 tablet 3   • metoprolol tartrate (LOPRESSOR) 25 MG tablet TAKE 1 TABLET TWICE DAILY 180 tablet 3   • Multiple Vitamin (MULTI VITAMIN DAILY PO) Take  by mouth.     • Omega-3 Fatty Acids (FISH OIL) 1200 MG capsule capsule Take  by mouth.     • PROAIR  (90 Base) MCG/ACT  inhaler      • Probiotic Product (PROBIOTIC-10) capsule      • rosuvastatin (CRESTOR) 10 MG tablet Take 1 tablet by mouth Daily. 90 tablet 3   • traZODone (DESYREL) 100 MG tablet Take 1 tablet by mouth Every Night. 90 tablet 3   • lansoprazole (PREVACID) 30 MG capsule Take 1 capsule by mouth Daily. 90 capsule 3   • Cetirizine HCl 10 MG capsule Take  by mouth.     • alendronate (Fosamax) 70 MG tablet Take 1 tablet by mouth Every 7 (Seven) Days. 12 tablet 3   • montelukast (SINGULAIR) 10 MG tablet Take 1 tablet by mouth Every Night. 90 tablet 1     No facility-administered medications prior to visit.     No orders of the defined types were placed in this encounter.    [unfilled]  Medications Discontinued During This Encounter   Medication Reason   • alendronate (Fosamax) 70 MG tablet    • montelukast (SINGULAIR) 10 MG tablet    • lansoprazole (PREVACID) 30 MG capsule          Return in about 6 months (around 3/23/2022).

## 2021-09-30 ENCOUNTER — HOSPITAL ENCOUNTER (OUTPATIENT)
Dept: INFUSION THERAPY | Facility: HOSPITAL | Age: 71
Setting detail: INFUSION SERIES
Discharge: HOME | End: 2021-09-30

## 2021-09-30 VITALS
HEART RATE: 64 BPM | DIASTOLIC BLOOD PRESSURE: 59 MMHG | HEIGHT: 64 IN | OXYGEN SATURATION: 96 % | TEMPERATURE: 97.8 F | WEIGHT: 150 LBS | SYSTOLIC BLOOD PRESSURE: 102 MMHG | RESPIRATION RATE: 16 BRPM | BODY MASS INDEX: 25.61 KG/M2

## 2021-09-30 DIAGNOSIS — M81.0 AGE-RELATED OSTEOPOROSIS WITHOUT CURRENT PATHOLOGICAL FRACTURE: Primary | ICD-10-CM

## 2021-09-30 PROCEDURE — 84100 ASSAY OF PHOSPHORUS: CPT | Performed by: FAMILY MEDICINE

## 2021-09-30 PROCEDURE — 36415 COLL VENOUS BLD VENIPUNCTURE: CPT

## 2021-09-30 PROCEDURE — 25010000002 DENOSUMAB 60 MG/ML SOLUTION PREFILLED SYRINGE: Performed by: FAMILY MEDICINE

## 2021-09-30 PROCEDURE — 83735 ASSAY OF MAGNESIUM: CPT | Performed by: FAMILY MEDICINE

## 2021-09-30 PROCEDURE — 96372 THER/PROPH/DIAG INJ SC/IM: CPT

## 2021-09-30 RX ADMIN — DENOSUMAB 60 MG: 60 INJECTION SUBCUTANEOUS at 09:38

## 2021-10-20 ENCOUNTER — APPOINTMENT (OUTPATIENT)
Dept: WOMENS IMAGING | Facility: HOSPITAL | Age: 71
End: 2021-10-20

## 2021-10-20 PROCEDURE — 77063 BREAST TOMOSYNTHESIS BI: CPT | Performed by: RADIOLOGY

## 2021-10-20 PROCEDURE — 77067 SCR MAMMO BI INCL CAD: CPT | Performed by: RADIOLOGY

## 2021-10-27 DIAGNOSIS — I25.810 CORONARY ARTERY DISEASE INVOLVING AUTOLOGOUS ARTERY CORONARY BYPASS GRAFT WITHOUT ANGINA PECTORIS: ICD-10-CM

## 2021-10-27 DIAGNOSIS — F51.01 PRIMARY INSOMNIA: ICD-10-CM

## 2021-10-27 RX ORDER — ROSUVASTATIN CALCIUM 10 MG/1
TABLET, COATED ORAL
Qty: 90 TABLET | Refills: 3 | Status: SHIPPED | OUTPATIENT
Start: 2021-10-27 | End: 2022-10-24

## 2021-10-27 RX ORDER — TRAZODONE HYDROCHLORIDE 100 MG/1
100 TABLET ORAL NIGHTLY
Qty: 90 TABLET | Refills: 3 | Status: SHIPPED | OUTPATIENT
Start: 2021-10-27 | End: 2022-10-24

## 2021-10-27 NOTE — TELEPHONE ENCOUNTER
Rx Refill Note  Requested Prescriptions     Pending Prescriptions Disp Refills    rosuvastatin (CRESTOR) 10 MG tablet [Pharmacy Med Name: ROSUVASTATIN CALCIUM 10 MG Tablet] 90 tablet 3     Sig: TAKE 1 TABLET EVERY DAY    traZODone (DESYREL) 100 MG tablet [Pharmacy Med Name: TRAZODONE HYDROCHLORIDE 100 MG Tablet] 90 tablet 3     Sig: Take 1 tablet by mouth Every Night.      Last office visit with prescribing clinician: 9/23/2021      Next office visit with prescribing clinician: 3/23/2022            Shiloh Christy MA  10/27/21, 09:22 EDT

## 2022-03-23 ENCOUNTER — OFFICE VISIT (OUTPATIENT)
Dept: INTERNAL MEDICINE | Facility: CLINIC | Age: 72
End: 2022-03-23

## 2022-03-23 VITALS
HEART RATE: 79 BPM | OXYGEN SATURATION: 98 % | WEIGHT: 156 LBS | DIASTOLIC BLOOD PRESSURE: 64 MMHG | BODY MASS INDEX: 26.63 KG/M2 | HEIGHT: 64 IN | SYSTOLIC BLOOD PRESSURE: 124 MMHG | TEMPERATURE: 97.1 F

## 2022-03-23 DIAGNOSIS — I25.810 CORONARY ARTERY DISEASE INVOLVING AUTOLOGOUS ARTERY CORONARY BYPASS GRAFT WITHOUT ANGINA PECTORIS: Primary | ICD-10-CM

## 2022-03-23 DIAGNOSIS — J30.89 NON-SEASONAL ALLERGIC RHINITIS, UNSPECIFIED TRIGGER: ICD-10-CM

## 2022-03-23 DIAGNOSIS — K21.9 LARYNGOPHARYNGEAL REFLUX: ICD-10-CM

## 2022-03-23 DIAGNOSIS — B00.1 HERPES LABIALIS: ICD-10-CM

## 2022-03-23 DIAGNOSIS — F51.01 PRIMARY INSOMNIA: ICD-10-CM

## 2022-03-23 DIAGNOSIS — R49.0 HOARSENESS OF VOICE: ICD-10-CM

## 2022-03-23 DIAGNOSIS — J45.40 MODERATE PERSISTENT ASTHMA WITHOUT COMPLICATION: ICD-10-CM

## 2022-03-23 DIAGNOSIS — R73.03 PREDIABETES: ICD-10-CM

## 2022-03-23 DIAGNOSIS — N39.0 URINARY TRACT INFECTION, RECURRENT: ICD-10-CM

## 2022-03-23 DIAGNOSIS — M81.0 AGE-RELATED OSTEOPOROSIS WITHOUT CURRENT PATHOLOGICAL FRACTURE: ICD-10-CM

## 2022-03-23 DIAGNOSIS — Z00.00 HEALTH CARE MAINTENANCE: ICD-10-CM

## 2022-03-23 PROCEDURE — 99214 OFFICE O/P EST MOD 30 MIN: CPT | Performed by: FAMILY MEDICINE

## 2022-03-23 RX ORDER — VALACYCLOVIR HYDROCHLORIDE 1 G/1
2000 TABLET, FILM COATED ORAL 2 TIMES DAILY
Qty: 24 TABLET | Refills: 3 | Status: SHIPPED | OUTPATIENT
Start: 2022-03-23 | End: 2022-03-24

## 2022-03-23 RX ORDER — LANSOPRAZOLE 30 MG/1
30 CAPSULE, DELAYED RELEASE ORAL 2 TIMES DAILY
COMMUNITY
Start: 2022-01-03 | End: 2022-03-28

## 2022-03-23 NOTE — PROGRESS NOTES
Colleen Lovell is a 71 y.o. female, who presents with a chief complaint of   Chief Complaint   Patient presents with   • Hypertension   • Hyperlipidemia     6 MON F/U        Hypertension    Coronary Artery Disease  Risk factors include hyperlipidemia.   Insomnia  Associated symptoms include arthralgias.   Hyperlipidemia       1.  CAD.  She had CABG X 3 at age 46.  Coronary catheterization 10/2020 showed occlusion of LAD diagonal, with the plan to treat medically. Tolerating rosuvastatin, isosorbide, ASA, beta blocker, ace-inhibitor.  She sees Dr. Rebecca Ocampo.    2. Hoarse voice.  Worsening X 6-12 months.  She reports her GERD and MAILE seem controlled.    3. Osteoporosis.  She didn't tolerate alendronate due to muscle cramps and we started Prolia last time.  She tolerated it.  2nd dose pending next week.    The following portions of the patient's history were reviewed and updated as appropriate: allergies, current medications, past family history, past medical history, past social history, past surgical history and problem list.    Allergies: Patient has no known allergies.    Review of Systems   Constitutional: Negative.    HENT: Positive for voice change.    Eyes: Negative.    Respiratory: Negative.    Cardiovascular: Negative.    Gastrointestinal: Negative.    Endocrine: Negative.    Genitourinary: Positive for frequency.   Musculoskeletal: Positive for arthralgias.   Skin: Negative.    Allergic/Immunologic: Positive for environmental allergies.   Neurological: Negative.    Hematological: Negative.    Psychiatric/Behavioral: Positive for sleep disturbance. The patient has insomnia.              Wt Readings from Last 3 Encounters:   03/23/22 70.8 kg (156 lb)   09/30/21 68 kg (150 lb)   09/23/21 71.3 kg (157 lb 3.2 oz)     Temp Readings from Last 3 Encounters:   03/23/22 97.1 °F (36.2 °C)   09/30/21 97.8 °F (36.6 °C) (Temporal)   09/23/21 97.8 °F (36.6 °C)     BP Readings from Last 3 Encounters:   03/23/22  124/64   09/30/21 102/59   09/23/21 115/60     Pulse Readings from Last 3 Encounters:   03/23/22 79   09/30/21 64   09/23/21 82     Body mass index is 26.76 kg/m².  @LASTSAO2(3)@    Physical Exam  Vitals and nursing note reviewed.   Constitutional:       Appearance: Normal appearance.   HENT:      Head: Normocephalic and atraumatic.      Nose: Nose normal.      Mouth/Throat:      Mouth: Mucous membranes are moist.   Eyes:      Extraocular Movements: Extraocular movements intact.      Conjunctiva/sclera: Conjunctivae normal.   Cardiovascular:      Rate and Rhythm: Normal rate and regular rhythm.      Heart sounds: No murmur heard.  Pulmonary:      Effort: Pulmonary effort is normal.      Breath sounds: Normal breath sounds.   Musculoskeletal:      Cervical back: Neck supple. No rigidity.      Right lower leg: No edema.      Left lower leg: No edema.   Skin:     General: Skin is warm and dry.   Neurological:      General: No focal deficit present.      Mental Status: She is alert and oriented to person, place, and time.   Psychiatric:         Mood and Affect: Mood normal.         Behavior: Behavior normal.             Diagnoses and all orders for this visit:    1. Coronary artery disease involving autologous artery coronary bypass graft without angina pectoris (Primary)    2. Non-seasonal allergic rhinitis, unspecified trigger    3. Laryngopharyngeal reflux    4. Prediabetes    5. Age-related osteoporosis without current pathological fracture    6. Urinary tract infection, recurrent    7. Primary insomnia    8. Moderate persistent asthma without complication    9. Health care maintenance    10. Hoarseness of voice  -     Ambulatory Referral to ENT (Otolaryngology)    11. Herpes labialis  -     valACYclovir (Valtrex) 1000 MG tablet; Take 2 tablets by mouth 2 (Two) Times a Day for 2 doses.  Dispense: 24 tablet; Refill: 3      1. CAD.  On ASA, isosorbide, lisinopril, metoprolol, rosuvastatin.  Per Dr. Rebecca Ocampo.    2.  MAILE. Controlled with cetirizine and fluticasone nasal spray.     3. GERD.  Controlled with lansoprazole 30 mg BID and Tums.    4. Prediabetes.  A1c stable at 5.9.  Continue lifestyle measures.    5. Osteoporosis.  Didn't tolerate alendronate.  Continue Prolia.  Next Dexa scan 4/2023.    6. Recurrent UTI.  She is doing well with vaginal estrogen per Dr. Turcios.  She took daily cephalexin for 3 months.    7. Insomnia.  Continue trazodone.  Doing well with this and lifestyle measures.    8. History of colon polyp.  Had colonoscopy June 2020 by Dr. Coyle.  Next due in 5 years, 6/2025.    9. Asthma, moderate persistent.  Controlled with Advair.    10. Routine health maint.  Pneumococcal vaccines UTD.  Shingrix and Tdap at pharmacy.  Mammogram UTD.  Covid-19 vaccine done.      11. Hoarse voice.  Refer to ENT.  She is on medication for GERD, asthma, and MAILE.    12. Herpes labialis.  Refilled valacyclovir.      Outpatient Medications Prior to Visit   Medication Sig Dispense Refill   • aspirin 81 MG EC tablet Take  by mouth.     • Cetirizine HCl 10 MG capsule Take  by mouth.     • Cholecalciferol (VITAMIN D3) 5000 UNITS capsule capsule Take 1,000 Units by mouth daily.     • Denosumab (PROLIA SC) Inject  under the skin into the appropriate area as directed.     • estradiol (ESTRACE) 0.1 MG/GM vaginal cream Insert 2 g into the vagina 3 (Three) Times a Week.     • fluticasone (FLONASE) 50 MCG/ACT nasal spray      • fluticasone-salmeterol (ADVAIR) 250-50 MCG/DOSE DISKUS Inhale 1 puff 2 (Two) Times a Day. 180 each 3   • isosorbide mononitrate (IMDUR) 30 MG 24 hr tablet TAKE 1 TABLET EVERY DAY 90 tablet 3   • lansoprazole (PREVACID) 30 MG capsule Take 30 mg by mouth 2 (Two) Times a Day.     • lisinopril (PRINIVIL,ZESTRIL) 2.5 MG tablet TAKE 1 TABLET EVERY DAY 90 tablet 3   • metoprolol tartrate (LOPRESSOR) 25 MG tablet TAKE 1 TABLET TWICE DAILY 180 tablet 3   • Multiple Vitamin (MULTI VITAMIN DAILY PO) Take  by mouth.      • Omega-3 Fatty Acids (FISH OIL) 1200 MG capsule capsule Take  by mouth.     • PROAIR  (90 Base) MCG/ACT inhaler      • Probiotic Product (PROBIOTIC-10) capsule      • rosuvastatin (CRESTOR) 10 MG tablet TAKE 1 TABLET EVERY DAY 90 tablet 3   • traZODone (DESYREL) 100 MG tablet TAKE 1 TABLET BY MOUTH EVERY NIGHT. 90 tablet 3   • lansoprazole (PREVACID) 15 MG capsule Take 15 mg by mouth 2 (two) times a day.       No facility-administered medications prior to visit.     New Medications Ordered This Visit   Medications   • valACYclovir (Valtrex) 1000 MG tablet     Sig: Take 2 tablets by mouth 2 (Two) Times a Day for 2 doses.     Dispense:  24 tablet     Refill:  3     [unfilled]  Medications Discontinued During This Encounter   Medication Reason   • lansoprazole (PREVACID) 15 MG capsule *Therapy completed         Return in about 6 months (around 9/23/2022).  Answers for HPI/ROS submitted by the patient on 3/16/2022  What is the primary reason for your visit?: Physical

## 2022-03-24 DIAGNOSIS — E55.9 HYPOVITAMINOSIS D: ICD-10-CM

## 2022-03-24 DIAGNOSIS — E78.5 HYPERLIPIDEMIA, UNSPECIFIED HYPERLIPIDEMIA TYPE: Primary | ICD-10-CM

## 2022-03-24 DIAGNOSIS — Z00.00 ROUTINE HEALTH MAINTENANCE: ICD-10-CM

## 2022-03-25 NOTE — TELEPHONE ENCOUNTER
Rx Refill Note  Requested Prescriptions     Pending Prescriptions Disp Refills    lansoprazole (PREVACID) 30 MG capsule [Pharmacy Med Name: LANSOPRAZOLE 30 MG Capsule Delayed Release] 90 capsule      Sig: TAKE 1 CAPSULE EVERY DAY      Last office visit with prescribing clinician: 3/23/2022      Next office visit with prescribing clinician: 10/10/2022            Eli Elizabeth MA  03/25/22, 17:04 EDT

## 2022-03-28 RX ORDER — LANSOPRAZOLE 30 MG/1
CAPSULE, DELAYED RELEASE ORAL
Qty: 90 CAPSULE | Refills: 3 | Status: SHIPPED | OUTPATIENT
Start: 2022-03-28

## 2022-03-31 ENCOUNTER — HOSPITAL ENCOUNTER (OUTPATIENT)
Dept: INFUSION THERAPY | Facility: HOSPITAL | Age: 72
Discharge: HOME | End: 2022-03-31
Admitting: FAMILY MEDICINE

## 2022-03-31 VITALS
RESPIRATION RATE: 16 BRPM | TEMPERATURE: 97.3 F | WEIGHT: 150 LBS | HEART RATE: 65 BPM | DIASTOLIC BLOOD PRESSURE: 70 MMHG | SYSTOLIC BLOOD PRESSURE: 112 MMHG | HEIGHT: 64 IN | OXYGEN SATURATION: 96 % | BODY MASS INDEX: 25.61 KG/M2

## 2022-03-31 DIAGNOSIS — M81.0 AGE-RELATED OSTEOPOROSIS WITHOUT CURRENT PATHOLOGICAL FRACTURE: Primary | ICD-10-CM

## 2022-03-31 PROCEDURE — 96372 THER/PROPH/DIAG INJ SC/IM: CPT

## 2022-03-31 PROCEDURE — 25010000002 DENOSUMAB 60 MG/ML SOLUTION PREFILLED SYRINGE: Performed by: FAMILY MEDICINE

## 2022-03-31 RX ADMIN — DENOSUMAB 60 MG: 60 INJECTION SUBCUTANEOUS at 10:18

## 2022-04-06 DIAGNOSIS — M81.0 AGE-RELATED OSTEOPOROSIS WITHOUT CURRENT PATHOLOGICAL FRACTURE: Primary | ICD-10-CM

## 2022-04-11 NOTE — TELEPHONE ENCOUNTER
Rx Refill Note  Requested Prescriptions     Pending Prescriptions Disp Refills    fluticasone-salmeterol (ADVAIR) 250-50 MCG/DOSE DISKUS [Pharmacy Med Name: FLUTICASONE PROPIONATE/SALMETEROL DISKUS 250-50 MCG/ACT Aerosol Powder Breath Activated] 180 each 3     Sig: INHALE 1 PUFF TWICE DAILY      Last office visit with prescribing clinician: 3/23/2022      Next office visit with prescribing clinician: 10/10/2022            Eli Elizabeth MA  04/11/22, 07:45 EDT

## 2022-07-07 DIAGNOSIS — I25.810 CORONARY ARTERY DISEASE INVOLVING AUTOLOGOUS ARTERY CORONARY BYPASS GRAFT WITHOUT ANGINA PECTORIS: ICD-10-CM

## 2022-07-07 DIAGNOSIS — I10 BENIGN ESSENTIAL HYPERTENSION: ICD-10-CM

## 2022-07-07 RX ORDER — LISINOPRIL 2.5 MG/1
TABLET ORAL
Qty: 90 TABLET | Refills: 3 | Status: SHIPPED | OUTPATIENT
Start: 2022-07-07

## 2022-07-07 NOTE — TELEPHONE ENCOUNTER
Rx Refill Note  Requested Prescriptions     Pending Prescriptions Disp Refills    lisinopril (PRINIVIL,ZESTRIL) 2.5 MG tablet [Pharmacy Med Name: LISINOPRIL 2.5 MG Tablet] 90 tablet 3     Sig: TAKE 1 TABLET EVERY DAY      Last office visit with prescribing clinician: 3/23/2022      Next office visit with prescribing clinician: 10/10/2022            NAKIA UNDERWOOD MA  07/07/22, 14:27 EDT

## 2022-09-26 DIAGNOSIS — M81.0 AGE-RELATED OSTEOPOROSIS WITHOUT CURRENT PATHOLOGICAL FRACTURE: ICD-10-CM

## 2022-10-10 ENCOUNTER — HOSPITAL ENCOUNTER (OUTPATIENT)
Dept: INFUSION THERAPY | Facility: HOSPITAL | Age: 72
Discharge: HOME | End: 2022-10-10
Admitting: FAMILY MEDICINE

## 2022-10-10 ENCOUNTER — OFFICE VISIT (OUTPATIENT)
Dept: INTERNAL MEDICINE | Facility: CLINIC | Age: 72
End: 2022-10-10

## 2022-10-10 VITALS
WEIGHT: 152 LBS | RESPIRATION RATE: 16 BRPM | SYSTOLIC BLOOD PRESSURE: 123 MMHG | HEIGHT: 64 IN | BODY MASS INDEX: 25.95 KG/M2 | TEMPERATURE: 97.4 F | OXYGEN SATURATION: 97 % | DIASTOLIC BLOOD PRESSURE: 72 MMHG | HEART RATE: 67 BPM

## 2022-10-10 VITALS
WEIGHT: 156.2 LBS | TEMPERATURE: 98 F | OXYGEN SATURATION: 97 % | HEART RATE: 74 BPM | SYSTOLIC BLOOD PRESSURE: 118 MMHG | BODY MASS INDEX: 26.67 KG/M2 | DIASTOLIC BLOOD PRESSURE: 70 MMHG | HEIGHT: 64 IN

## 2022-10-10 DIAGNOSIS — E55.9 HYPOVITAMINOSIS D: ICD-10-CM

## 2022-10-10 DIAGNOSIS — M81.0 AGE-RELATED OSTEOPOROSIS WITHOUT CURRENT PATHOLOGICAL FRACTURE: Primary | ICD-10-CM

## 2022-10-10 DIAGNOSIS — Z00.00 MEDICARE ANNUAL WELLNESS VISIT, SUBSEQUENT: Primary | ICD-10-CM

## 2022-10-10 PROCEDURE — 1160F RVW MEDS BY RX/DR IN RCRD: CPT | Performed by: FAMILY MEDICINE

## 2022-10-10 PROCEDURE — G0439 PPPS, SUBSEQ VISIT: HCPCS | Performed by: FAMILY MEDICINE

## 2022-10-10 PROCEDURE — 1170F FXNL STATUS ASSESSED: CPT | Performed by: FAMILY MEDICINE

## 2022-10-10 PROCEDURE — 25010000002 DENOSUMAB 60 MG/ML SOLUTION PREFILLED SYRINGE: Performed by: FAMILY MEDICINE

## 2022-10-10 PROCEDURE — 96372 THER/PROPH/DIAG INJ SC/IM: CPT

## 2022-10-10 RX ORDER — FAMOTIDINE 20 MG/1
20 TABLET, FILM COATED ORAL NIGHTLY
COMMUNITY

## 2022-10-10 RX ORDER — AMOXICILLIN 250 MG
CAPSULE ORAL DAILY
COMMUNITY

## 2022-10-10 RX ORDER — ESTRADIOL 0.1 MG/G
2 CREAM VAGINAL 3 TIMES WEEKLY
Qty: 42.5 G | Refills: 3 | Status: SHIPPED | OUTPATIENT
Start: 2022-10-10

## 2022-10-10 RX ADMIN — DENOSUMAB 60 MG: 60 INJECTION SUBCUTANEOUS at 08:55

## 2022-10-10 NOTE — PROGRESS NOTES
The ABCs of the Annual Wellness Visit  Subsequent Medicare Wellness Visit    Chief Complaint   Patient presents with   • Medicare Wellness-subsequent      Subjective    History of Present Illness:  Colleen Lovell is a 72 y.o. female who presents for a Subsequent Medicare Wellness Visit.    The following portions of the patient's history were reviewed and   updated as appropriate: allergies, current medications, past family history, past medical history, past social history, past surgical history and problem list.    Compared to one year ago, the patient feels her physical   health is the same.    Compared to one year ago, the patient feels her mental   health is the same.    Recent Hospitalizations:  She was not admitted to the hospital during the last year.       Current Medical Providers:  Patient Care Team:  Tracee Curran MD as PCP - General (Family Medicine)    Outpatient Medications Prior to Visit   Medication Sig Dispense Refill   • aspirin 81 MG EC tablet Take  by mouth.     • Calcium Carb-Cholecalciferol (Calcium 1000 + D) 1000-800 MG-UNIT tablet Daily.     • Cetirizine HCl 10 MG capsule Take  by mouth.     • Cholecalciferol (VITAMIN D3) 5000 UNITS capsule capsule Take 1,000 Units by mouth daily.     • Cobalamin Combinations (B-12) 100-5000 MCG sublingual tablet Daily.     • Denosumab (PROLIA SC) Inject  under the skin into the appropriate area as directed Every 6 (Six) Months.     • famotidine (PEPCID) 20 MG tablet Take 1 tablet by mouth Every Night.     • fluticasone-salmeterol (ADVAIR) 250-50 MCG/DOSE DISKUS INHALE 1 PUFF TWICE DAILY 180 each 3   • isosorbide mononitrate (IMDUR) 30 MG 24 hr tablet TAKE 1 TABLET EVERY DAY 90 tablet 3   • lansoprazole (PREVACID) 30 MG capsule TAKE 1 CAPSULE EVERY DAY 90 capsule 3   • lisinopril (PRINIVIL,ZESTRIL) 2.5 MG tablet TAKE 1 TABLET EVERY DAY 90 tablet 3   • metoprolol tartrate (LOPRESSOR) 25 MG tablet TAKE 1 TABLET TWICE DAILY 180 tablet 3   • Multiple  Vitamin (MULTI VITAMIN DAILY PO) Take  by mouth.     • Omega-3 Fatty Acids (FISH OIL) 1200 MG capsule capsule Take  by mouth.     • PROAIR  (90 Base) MCG/ACT inhaler      • Probiotic Product (PROBIOTIC-10) capsule      • rosuvastatin (CRESTOR) 10 MG tablet TAKE 1 TABLET EVERY DAY 90 tablet 3   • traZODone (DESYREL) 100 MG tablet TAKE 1 TABLET BY MOUTH EVERY NIGHT. 90 tablet 3   • VALACYCLOVIR HCL PO As Needed.     • estradiol (ESTRACE) 0.1 MG/GM vaginal cream Insert 2 g into the vagina 3 (Three) Times a Week.     • fluticasone (FLONASE) 50 MCG/ACT nasal spray        No facility-administered medications prior to visit.       No opioid medication identified on active medication list. I have reviewed chart for other potential  high risk medication/s and harmful drug interactions in the elderly.          Aspirin is on active medication list. Aspirin use is indicated based on review of current medical condition/s. Pros and cons of this therapy have been discussed today. Benefits of this medication outweigh potential harm.  Patient has been encouraged to continue taking this medication.  .      Patient Active Problem List   Diagnosis   • Atopic rhinitis   • Herpes simplex type 1 infection   • Impaired fasting glucose   • Insomnia   • Menopausal symptom   • Age-related osteoporosis without current pathological fracture   • Laryngopharyngeal reflux   • Urinary tract infection, recurrent   • Health care maintenance   • CAD (coronary artery disease)   • Benign essential hypertension   • Diastolic dysfunction   • History of adenomatous polyp of colon   • Nocturia   • Prediabetes   • Moderate persistent asthma without complication   • Herpes labialis     Advance Care Planning  Advance Directive is on file.  ACP discussion was held with the patient during this visit. Patient has an advance directive in EMR which is still valid.           Objective    Vitals:    10/10/22 1130   BP: 118/70   BP Location: Left arm   Patient  "Position: Sitting   Cuff Size: Adult   Pulse: 74   Temp: 98 °F (36.7 °C)   SpO2: 97%   Weight: 70.9 kg (156 lb 3.2 oz)   Height: 162.6 cm (64\")     Estimated body mass index is 26.81 kg/m² as calculated from the following:    Height as of this encounter: 162.6 cm (64\").    Weight as of this encounter: 70.9 kg (156 lb 3.2 oz).    BMI is >= 25 and <30. (Overweight) The following options were offered after discussion;: nutrition counseling/recommendations      Does the patient have evidence of cognitive impairment? No    Physical Exam  Lab Results   Component Value Date    CHLPL 141 09/30/2022    TRIG 67 09/30/2022    HDL 61 (H) 09/30/2022    LDL 66 09/30/2022    VLDL 14 09/30/2022    HGBA1C 5.80 (H) 09/30/2022            HEALTH RISK ASSESSMENT    Smoking Status:  Social History     Tobacco Use   Smoking Status Never   Smokeless Tobacco Never     Alcohol Consumption:  Social History     Substance and Sexual Activity   Alcohol Use No     Fall Risk Screen:    STEADI Fall Risk Assessment was completed, and patient is at LOW risk for falls.Assessment completed on:10/10/2022    Depression Screening:  PHQ-2/PHQ-9 Depression Screening 10/10/2022   Retired PHQ-9 Total Score -   Retired Total Score -   Little Interest or Pleasure in Doing Things 0-->not at all   Feeling Down, Depressed or Hopeless 0-->not at all   PHQ-9: Brief Depression Severity Measure Score 0       Health Habits and Functional and Cognitive Screening:  Functional & Cognitive Status 10/10/2022   Do you have difficulty preparing food and eating? No   Do you have difficulty bathing yourself, getting dressed or grooming yourself? No   Do you have difficulty using the toilet? No   Do you have difficulty moving around from place to place? No   Do you have trouble with steps or getting out of a bed or a chair? No   Current Diet Well Balanced Diet   Dental Exam Up to date   Eye Exam Up to date   Exercise (times per week) 4 times per week   Current Exercises Include " Walking   Do you need help using the phone?  No   Are you deaf or do you have serious difficulty hearing?  No   Do you need help with transportation? No   Do you need help shopping? No   Do you need help preparing meals?  No   Do you need help with housework?  No   Do you need help with laundry? No   Do you need help taking your medications? No   Do you need help managing money? No   Do you ever drive or ride in a car without wearing a seat belt? No       Age-appropriate Screening Schedule:  Refer to the list below for future screening recommendations based on patient's age, sex and/or medical conditions. Orders for these recommended tests are listed in the plan section. The patient has been provided with a written plan.    Health Maintenance   Topic Date Due   • PAP SMEAR  02/06/2020   • MAMMOGRAM  10/20/2022   • DXA SCAN  04/08/2023   • LIPID PANEL  09/30/2023   • ZOSTER VACCINE  Addressed   • INFLUENZA VACCINE  Discontinued   • TDAP/TD VACCINES  Discontinued              Assessment & Plan   CMS Preventative Services Quick Reference  Risk Factors Identified During Encounter  Immunizations Discussed/Encouraged (specific Immunizations; Shingrix, 2nd dose later this month at pharmacy; flu vaccine at pharmacy.  The above risks/problems have been discussed with the patient.  Follow up actions/plans if indicated are seen below in the Assessment/Plan Section.  Pertinent information has been shared with the patient in the After Visit Summary.    Diagnoses and all orders for this visit:    1. Medicare annual wellness visit, subsequent (Primary)  -     Comprehensive metabolic panel; Future  -     Vitamin D 25 hydroxy; Future  -     Vitamin B12; Future  -     Lipid panel; Future  -     TSH; Future  -     CBC w AUTO Differential; Future  -     Phosphorus; Future  -     Magnesium; Future    2. Hypovitaminosis D  -     Vitamin D 25 hydroxy; Future    Other orders  -     estradiol (ESTRACE) 0.1 MG/GM vaginal cream; Insert 2 g  into the vagina 3 (Three) Times a Week.  Dispense: 42.5 g; Refill: 3      1. CAD.  On ASA, isosorbide, lisinopril, metoprolol, rosuvastatin.  Per Dr. Rebecca Ocampo.     2. MAILE. Controlled with cetirizine and fluticasone nasal spray.      3. GERD.  Controlled with lansoprazole 30 mg daily and famotidine nightly.  She has intermittent hoarseness and saw ENT and had laryngoscopy.     4. Prediabetes.  A1c  5.8, down from 6.0.  Continue lifestyle measures.     5. Osteoporosis.  Didn't tolerate alendronate.  Continue Prolia.  Next Dexa scan 4/2023.     6. Recurrent UTI.  She is doing well with vaginal estrogen per Dr. Turcios.  She took daily cephalexin for 3 months.     7. Insomnia.  Continue trazodone.  Doing well with this and lifestyle measures.     8. History of colon polyp.  Had colonoscopy June 2020 by Dr. Coyle.  Next due in 5 years, 6/2025.     9. Asthma, moderate persistent.  Controlled with Advair.     10. Routine health maint.  Pneumococcal vaccines UTD.  2nd dose Shingrix and Tdap at pharmacy.  Mammogram pending later this month.  Covid-19 vaccine done.       11. Herpes labialis.  Controlled with intermittent valacyclovir.  Follow Up:   No follow-ups on file.     An After Visit Summary and PPPS were made available to the patient.

## 2022-10-10 NOTE — PATIENT INSTRUCTIONS
"Call [facility] at [phone] Tracee Curran MD if you have any problems or concerns.    We know you have a Choice in healthcare and appreciate you using [facility].  Our purpose is to provide you \"Excellent Care\".  We hope that you will always choose us in the future and continue to recommend us to your family and friends.     "

## 2022-10-10 NOTE — NURSING NOTE
0855  Pt here ambulatory to [facility] for Prolia injection.  0910  Pt discharged ambulatory with AVS paperwork and next appt.  Pt wes injection without any adverse effects.

## 2022-10-22 DIAGNOSIS — F51.01 PRIMARY INSOMNIA: ICD-10-CM

## 2022-10-22 DIAGNOSIS — I25.810 CORONARY ARTERY DISEASE INVOLVING AUTOLOGOUS ARTERY CORONARY BYPASS GRAFT WITHOUT ANGINA PECTORIS: ICD-10-CM

## 2022-10-24 RX ORDER — TRAZODONE HYDROCHLORIDE 100 MG/1
100 TABLET ORAL NIGHTLY
Qty: 90 TABLET | Refills: 3 | Status: SHIPPED | OUTPATIENT
Start: 2022-10-24

## 2022-10-24 RX ORDER — ROSUVASTATIN CALCIUM 10 MG/1
TABLET, COATED ORAL
Qty: 90 TABLET | Refills: 3 | Status: SHIPPED | OUTPATIENT
Start: 2022-10-24 | End: 2023-04-05

## 2022-10-24 RX ORDER — ISOSORBIDE MONONITRATE 30 MG/1
TABLET, EXTENDED RELEASE ORAL
Qty: 90 TABLET | Refills: 3 | Status: SHIPPED | OUTPATIENT
Start: 2022-10-24

## 2022-10-26 ENCOUNTER — APPOINTMENT (OUTPATIENT)
Dept: WOMENS IMAGING | Facility: HOSPITAL | Age: 72
End: 2022-10-26

## 2022-10-26 PROCEDURE — 77063 BREAST TOMOSYNTHESIS BI: CPT | Performed by: RADIOLOGY

## 2022-10-26 PROCEDURE — 77067 SCR MAMMO BI INCL CAD: CPT | Performed by: RADIOLOGY

## 2022-11-02 DIAGNOSIS — R92.8 ABNORMALITY OF RIGHT BREAST ON SCREENING MAMMOGRAM: Primary | ICD-10-CM

## 2022-11-18 ENCOUNTER — HOSPITAL ENCOUNTER (OUTPATIENT)
Dept: MAMMOGRAPHY | Facility: HOSPITAL | Age: 72
Discharge: HOME | End: 2022-11-18

## 2022-11-18 ENCOUNTER — HOSPITAL ENCOUNTER (OUTPATIENT)
Dept: ULTRASOUND IMAGING | Facility: HOSPITAL | Age: 72
Discharge: HOME | End: 2022-11-18

## 2022-11-18 DIAGNOSIS — R92.8 ABNORMALITY OF RIGHT BREAST ON SCREENING MAMMOGRAM: ICD-10-CM

## 2022-11-18 PROCEDURE — 77065 DX MAMMO INCL CAD UNI: CPT

## 2022-11-18 PROCEDURE — G0279 TOMOSYNTHESIS, MAMMO: HCPCS

## 2022-11-18 PROCEDURE — 76642 ULTRASOUND BREAST LIMITED: CPT

## 2023-02-01 RX ORDER — FLUTICASONE PROPIONATE AND SALMETEROL 250; 50 UG/1; UG/1
POWDER RESPIRATORY (INHALATION)
Qty: 180 EACH | Refills: 3 | Status: SHIPPED | OUTPATIENT
Start: 2023-02-01 | End: 2023-02-22

## 2023-02-22 RX ORDER — BUDESONIDE AND FORMOTEROL FUMARATE DIHYDRATE 160; 4.5 UG/1; UG/1
2 AEROSOL RESPIRATORY (INHALATION)
Qty: 3 EACH | Refills: 3 | Status: SHIPPED | OUTPATIENT
Start: 2023-02-22

## 2023-03-26 ENCOUNTER — HOSPITAL ENCOUNTER (EMERGENCY)
Facility: HOSPITAL | Age: 73
Discharge: HOME | End: 2023-03-26
Attending: EMERGENCY MEDICINE | Admitting: EMERGENCY MEDICINE
Payer: MEDICARE

## 2023-03-26 ENCOUNTER — APPOINTMENT (OUTPATIENT)
Dept: GENERAL RADIOLOGY | Facility: HOSPITAL | Age: 73
End: 2023-03-26
Payer: MEDICARE

## 2023-03-26 VITALS
HEIGHT: 64 IN | HEART RATE: 64 BPM | WEIGHT: 155 LBS | TEMPERATURE: 98.1 F | OXYGEN SATURATION: 95 % | RESPIRATION RATE: 16 BRPM | DIASTOLIC BLOOD PRESSURE: 65 MMHG | BODY MASS INDEX: 26.46 KG/M2 | SYSTOLIC BLOOD PRESSURE: 130 MMHG

## 2023-03-26 DIAGNOSIS — R07.89 ATYPICAL CHEST PAIN: Primary | ICD-10-CM

## 2023-03-26 DIAGNOSIS — T78.40XA ALLERGY, INITIAL ENCOUNTER: ICD-10-CM

## 2023-03-26 LAB
ALBUMIN SERPL-MCNC: 4.2 G/DL (ref 3.5–5.2)
ALBUMIN/GLOB SERPL: 1.7 G/DL
ALP SERPL-CCNC: 56 U/L (ref 39–117)
ALT SERPL W P-5'-P-CCNC: 16 U/L (ref 1–33)
ANION GAP SERPL CALCULATED.3IONS-SCNC: 11.2 MMOL/L (ref 5–15)
AST SERPL-CCNC: 20 U/L (ref 1–32)
BASOPHILS # BLD AUTO: 0.07 10*3/MM3 (ref 0–0.2)
BASOPHILS NFR BLD AUTO: 0.9 % (ref 0–1.5)
BILIRUB SERPL-MCNC: 0.9 MG/DL (ref 0–1.2)
BUN SERPL-MCNC: 14 MG/DL (ref 8–23)
BUN/CREAT SERPL: 18.7 (ref 7–25)
CALCIUM SPEC-SCNC: 8.6 MG/DL (ref 8.6–10.5)
CHLORIDE SERPL-SCNC: 106 MMOL/L (ref 98–107)
CO2 SERPL-SCNC: 21.8 MMOL/L (ref 22–29)
CREAT SERPL-MCNC: 0.75 MG/DL (ref 0.57–1)
D DIMER PPP FEU-MCNC: 0.37 MCGFEU/ML (ref 0–0.71)
DEPRECATED RDW RBC AUTO: 42.3 FL (ref 37–54)
EGFRCR SERPLBLD CKD-EPI 2021: 85.2 ML/MIN/1.73
EOSINOPHIL # BLD AUTO: 0.18 10*3/MM3 (ref 0–0.4)
EOSINOPHIL NFR BLD AUTO: 2.4 % (ref 0.3–6.2)
ERYTHROCYTE [DISTWIDTH] IN BLOOD BY AUTOMATED COUNT: 12.9 % (ref 12.3–15.4)
GEN 5 2HR TROPONIN T REFLEX: <6 NG/L
GLOBULIN UR ELPH-MCNC: 2.5 GM/DL
GLUCOSE SERPL-MCNC: 105 MG/DL (ref 65–99)
HCT VFR BLD AUTO: 39.6 % (ref 34–46.6)
HGB BLD-MCNC: 13.2 G/DL (ref 12–15.9)
HOLD SPECIMEN: NORMAL
HOLD SPECIMEN: NORMAL
IMM GRANULOCYTES # BLD AUTO: 0.03 10*3/MM3 (ref 0–0.05)
IMM GRANULOCYTES NFR BLD AUTO: 0.4 % (ref 0–0.5)
LYMPHOCYTES # BLD AUTO: 2.2 10*3/MM3 (ref 0.7–3.1)
LYMPHOCYTES NFR BLD AUTO: 29.1 % (ref 19.6–45.3)
MCH RBC QN AUTO: 29.9 PG (ref 26.6–33)
MCHC RBC AUTO-ENTMCNC: 33.3 G/DL (ref 31.5–35.7)
MCV RBC AUTO: 89.6 FL (ref 79–97)
MONOCYTES # BLD AUTO: 0.51 10*3/MM3 (ref 0.1–0.9)
MONOCYTES NFR BLD AUTO: 6.8 % (ref 5–12)
NEUTROPHILS NFR BLD AUTO: 4.56 10*3/MM3 (ref 1.7–7)
NEUTROPHILS NFR BLD AUTO: 60.4 % (ref 42.7–76)
NRBC BLD AUTO-RTO: 0 /100 WBC (ref 0–0.2)
NT-PROBNP SERPL-MCNC: 143.3 PG/ML (ref 0–900)
PLATELET # BLD AUTO: 267 10*3/MM3 (ref 140–450)
PMV BLD AUTO: 9.3 FL (ref 6–12)
POTASSIUM SERPL-SCNC: 4 MMOL/L (ref 3.5–5.2)
PROT SERPL-MCNC: 6.7 G/DL (ref 6–8.5)
RBC # BLD AUTO: 4.42 10*6/MM3 (ref 3.77–5.28)
SODIUM SERPL-SCNC: 139 MMOL/L (ref 136–145)
TROPONIN T DELTA: NORMAL
TROPONIN T SERPL HS-MCNC: 6 NG/L
WBC NRBC COR # BLD: 7.55 10*3/MM3 (ref 3.4–10.8)
WHOLE BLOOD HOLD COAG: NORMAL
WHOLE BLOOD HOLD SPECIMEN: NORMAL

## 2023-03-26 PROCEDURE — 93010 ELECTROCARDIOGRAM REPORT: CPT | Performed by: INTERNAL MEDICINE

## 2023-03-26 PROCEDURE — 36415 COLL VENOUS BLD VENIPUNCTURE: CPT

## 2023-03-26 PROCEDURE — 93005 ELECTROCARDIOGRAM TRACING: CPT | Performed by: EMERGENCY MEDICINE

## 2023-03-26 PROCEDURE — 84484 ASSAY OF TROPONIN QUANT: CPT | Performed by: EMERGENCY MEDICINE

## 2023-03-26 PROCEDURE — 71046 X-RAY EXAM CHEST 2 VIEWS: CPT

## 2023-03-26 PROCEDURE — 85025 COMPLETE CBC W/AUTO DIFF WBC: CPT | Performed by: EMERGENCY MEDICINE

## 2023-03-26 PROCEDURE — 85379 FIBRIN DEGRADATION QUANT: CPT | Performed by: EMERGENCY MEDICINE

## 2023-03-26 PROCEDURE — 83880 ASSAY OF NATRIURETIC PEPTIDE: CPT | Performed by: EMERGENCY MEDICINE

## 2023-03-26 PROCEDURE — 80053 COMPREHEN METABOLIC PANEL: CPT | Performed by: EMERGENCY MEDICINE

## 2023-03-26 PROCEDURE — 99284 EMERGENCY DEPT VISIT MOD MDM: CPT

## 2023-03-26 RX ORDER — ASPIRIN 325 MG
325 TABLET ORAL ONCE
Status: DISCONTINUED | OUTPATIENT
Start: 2023-03-26 | End: 2023-03-26 | Stop reason: HOSPADM

## 2023-03-26 RX ORDER — ASPIRIN 81 MG/1
TABLET, CHEWABLE ORAL
Status: COMPLETED
Start: 2023-03-26 | End: 2023-03-26

## 2023-03-26 RX ORDER — SODIUM CHLORIDE 0.9 % (FLUSH) 0.9 %
10 SYRINGE (ML) INJECTION AS NEEDED
Status: DISCONTINUED | OUTPATIENT
Start: 2023-03-26 | End: 2023-03-26 | Stop reason: HOSPADM

## 2023-03-26 RX ADMIN — ASPIRIN 81 MG 243 MG: 81 TABLET ORAL at 11:50

## 2023-03-26 NOTE — ED PROVIDER NOTES
Subjective   History of Present Illness  History of Present Illness    Chief complaint: Chest pain    Location: Under left breast    Quality/Severity: Sharp    Timing/Onset/Duration: An episode Friday, another episode at 930 this morning that lasted just a few seconds.    Modifying Factors: Better with time    Associated Symptoms: No headache.  No fever or chills.  The patient has had a cough productive of clear to white sputum for the last 2 weeks.  Patient has chronic nasal congestion related to allergies.  No sore throat.  Patient denied shortness of breath.  Patient did get diaphoretic.  No nausea or vomiting.  No abdominal pain.  No diarrhea or burning on urination.  About an hour ago the patient started complaining of some left shoulder pain, distinctly different than the pain in the left breast that she felt earlier.    Narrative: This 72-year-old white female with history of coronary artery disease presents with chest pain.  The patient's last stress test was 2 to 4 years ago according to the patient it was unremarkable.  Patient had a coronary artery bypass graft 25 years ago.    PCP:Tracee Curran MD    Cardiology:  Rebecca Ocampo  Review of Systems   Constitutional: Positive for diaphoresis. Negative for chills and fever.   HENT: Positive for congestion. Negative for ear pain and sore throat.    Respiratory: Positive for cough. Negative for shortness of breath.    Cardiovascular: Positive for chest pain.   Gastrointestinal: Negative for abdominal pain, diarrhea, nausea and vomiting.   Genitourinary: Negative for dysuria.   Musculoskeletal: Negative for back pain.   Skin: Negative for rash.   Neurological: Negative for headaches.        Medication List      ASK your doctor about these medications    aspirin 81 MG EC tablet     B-12 100-5000 MCG sublingual tablet     budesonide-formoterol 160-4.5 MCG/ACT inhaler  Commonly known as: Symbicort  Inhale 2 puffs 2 (Two) Times a Day.     Calcium 1000 + D  "1000-20 MG-MCG tablet  Generic drug: Calcium Carb-Cholecalciferol     Cetirizine HCl 10 MG capsule     estradiol 0.1 MG/GM vaginal cream  Commonly known as: ESTRACE  Insert 2 g into the vagina 3 (Three) Times a Week.     famotidine 20 MG tablet  Commonly known as: PEPCID     fish oil 1200 MG capsule capsule     fluticasone 50 MCG/ACT nasal spray  Commonly known as: FLONASE     isosorbide mononitrate 30 MG 24 hr tablet  Commonly known as: IMDUR  TAKE 1 TABLET EVERY DAY     lansoprazole 30 MG capsule  Commonly known as: PREVACID  TAKE 1 CAPSULE EVERY DAY     lisinopril 2.5 MG tablet  Commonly known as: PRINIVIL,ZESTRIL  TAKE 1 TABLET EVERY DAY     metoprolol tartrate 25 MG tablet  Commonly known as: LOPRESSOR  TAKE 1 TABLET TWICE DAILY     multivitamin tablet tablet  Commonly known as: THERAGRAN     ProAir  (90 Base) MCG/ACT inhaler  Generic drug: albuterol sulfate HFA     Probiotic-10 capsule     PROLIA SC     rosuvastatin 10 MG tablet  Commonly known as: CRESTOR  TAKE 1 TABLET EVERY DAY     traZODone 100 MG tablet  Commonly known as: DESYREL  TAKE 1 TABLET BY MOUTH EVERY NIGHT.     VALACYCLOVIR HCL PO     vitamin D3 125 MCG (5000 UT) capsule capsule            Past Medical History:   Diagnosis Date   • Abnormal liver function test    • Arthritis    • CAD (coronary artery disease)     1997, s/p CABG, \"-\"PET scan 06/2011, reversible ischemia on Lexiscan PET perfusion study 12/16 Dr.Janet Ocampo   • Cholelithiasis     5/13 CT   • Chronic UTI    • GERD (gastroesophageal reflux disease)    • Heart disease    • Herpes simplex type 1 infection     orolabial   • Herpes zoster     7/2010   • Hx of bone scan      11/08/11: sp -1.8, hip -0.5, fem neck -0.8 01/30/14: sp -1.7, hip -0.6, fem neck -1.0   • Hyperlipidemia    • Incidental lung nodule, less than or equal to 3mm     CTPET 12/2016 [place], no f/u needed   • Insomnia    • Internal hemorrhoids    • Obstructive sleep apnea     mild 2010   • Osteopenia     Dexa " 11 sp -1.8 hip -0.5 fem neck -0.8   • Papanicolaou smear     2011   • Pregnancy        • Rotator cuff tendinitis        • Tubular adenoma of colon     , , cecal polyp, 2012, , adenoma with mild dysplasia 4 mm ascending colon       No Known Allergies    Past Surgical History:   Procedure Laterality Date   • BLADDER SUSPENSION     • BREAST IMPLANT SURGERY     • COLONOSCOPY        - ascending colon adenoma with mild dysplasia.    • COLONOSCOPY  2015    Dr Coyle - patient is to follow up in 2020   • CORONARY ARTERY BYPASS GRAFT     • LAPAROSCOPIC TUBAL LIGATION     • ROTATOR CUFF REPAIR Left        Family History   Problem Relation Age of Onset   • Diabetes Paternal Grandmother    • Heart disease Maternal Aunt    • Breast cancer Maternal Aunt    • Colon polyps Maternal Aunt    • Heart disease Maternal Uncle    • Brain cancer Father    • Osteoporosis Mother    • Breast cancer Mother 89        ductal   • Hip fracture Maternal Aunt        Social History     Socioeconomic History   • Marital status:    Tobacco Use   • Smoking status: Never   • Smokeless tobacco: Never   Substance and Sexual Activity   • Alcohol use: No   • Drug use: No   • Sexual activity: Yes     Partners: Male           Objective   Physical Exam  Vitals (The temperature is 98.1 °F, pulse 66, respirations 16, /86, room air pulse ox 96%) and nursing note reviewed.   Constitutional:       Appearance: She is well-developed.   HENT:      Head: Normocephalic and atraumatic.   Cardiovascular:      Rate and Rhythm: Normal rate and regular rhythm.      Heart sounds: Normal heart sounds. No murmur heard.    No friction rub. No gallop.   Pulmonary:      Effort: Pulmonary effort is normal.      Breath sounds: Normal breath sounds.   Chest:      Chest wall: No tenderness.   Abdominal:      General: Bowel sounds are normal.      Palpations: Abdomen is soft. There is  no mass.      Tenderness: There is no abdominal tenderness. There is no guarding or rebound.   Musculoskeletal:      Cervical back: Normal range of motion and neck supple.      Right lower leg: No tenderness. No edema.      Left lower leg: No tenderness. No edema.   Skin:     General: Skin is warm and dry.      Capillary Refill: Capillary refill takes less than 2 seconds.   Neurological:      General: No focal deficit present.      Mental Status: She is alert and oriented to person, place, and time.      Cranial Nerves: No cranial nerve deficit.      Motor: No weakness.         Procedures           ED Course  ED Course as of 03/26/23 1441   Sun Mar 26, 2023   1200 The CBC is unremarkable. [RC]   1232 The initial high-sensitivity troponin is 6, normal.    The serum glucose is 105, CO2 21, GFR 85, CMP otherwise unremarkable [RC]   1233 The D-dimer is normal at 0.37 [RC]   1249 The proBNP is 143. [RC]   1437 The repeat high-sensitivity troponin is less than 6, the delta is minus. [RC]      ED Course User Index  [RC] Major De Los Santos MD      11:33 EDT, 03/26/23:  The EKG was obtained at 1130 and read by me at 1130.  The EKG shows a normal sinus rhythm with rate of 65.  There is a normal axis with no hypertrophy.  The ME, QRS, QT intervals are unremarkable.  There is an occasional PVC.  There is nonspecific T wave abnormality T's in the lateral leads.  There is no acute ST elevation or depression     14:42 EDT, 03/26/23:  The patient was reassessed.  She has no pain.  Her vital signs were reviewed and are stable    14:42 EDT, 03/26/23:  The patient symptoms are not concerning for acute coronary ischemia, there has been to sets of normal enzymes with a normal delta T, there is a normal D-dimer and BNP.  Chest x-ray is unremarkable.  EKG is not concerning.  The patient's symptoms seem most consistent with musculoskeletal pain.  The patient should take Motrin or Tylenol as needed as directed for the pain.  She should  follow-up with Regi Walker within 1 week.  She should call her cardiologist Monday for a follow-up within 1 week.  She should return to the emergency department if there is worsening pain, shortness of breath, worse in any way at all.  The patient does have some cough and congestion that may be related to allergies and/or viral URI that is contributing to the patient's chest pain.  She should take Robitussin as needed as directed for cough.  The patient should take her Zyrtec as needed as directed for congestion.  All the patient question answered she will be discharged in good condition                                MDM    Final diagnoses:   Atypical chest pain   Allergy, initial encounter       ED Disposition  ED Disposition     None          No follow-up provider specified.       Medication List      No changes were made to your prescriptions during this visit.          Major De Los Santos MD  03/26/23 9947

## 2023-03-26 NOTE — DISCHARGE INSTRUCTIONS
Take your Zyrtec and Flonase as prescribed.  Take Tylenol or Motrin as needed as directed for pain.  Follow-up with Dr. Walker within 1 week.  Follow-up with Rebecca Ocampo, your cardiologist, call in the morning for a follow-up appointment this week.  Return to the emergency department there is worsening pain, shortness of breath, worse in any way at all.  All the patient questions were answered she will be discharged in good condition

## 2023-03-26 NOTE — ED NOTES
Pt complaining of a sharp CP under her L breast since Friday. Pt stated that the pain was intermittent but became more consistent today while sitting in bible study. Pt reports hx of triple bypass about 25 years ago. Pt denies SOB. Pt stated that she has had a dry cough x2 weeks but tested negative for covid.

## 2023-03-27 LAB — QT INTERVAL: 424 MS

## 2023-04-03 DIAGNOSIS — R73.01 IMPAIRED FASTING GLUCOSE: ICD-10-CM

## 2023-04-03 DIAGNOSIS — R73.03 PREDIABETES: ICD-10-CM

## 2023-04-03 DIAGNOSIS — M81.0 AGE-RELATED OSTEOPOROSIS WITHOUT CURRENT PATHOLOGICAL FRACTURE: ICD-10-CM

## 2023-04-04 LAB — HBA1C MFR BLD: 5.8 % (ref 4.8–5.6)

## 2023-04-05 ENCOUNTER — OFFICE VISIT (OUTPATIENT)
Dept: CARDIOLOGY | Facility: CLINIC | Age: 73
End: 2023-04-05
Payer: MEDICARE

## 2023-04-05 ENCOUNTER — HOSPITAL ENCOUNTER (OUTPATIENT)
Dept: CARDIOLOGY | Facility: HOSPITAL | Age: 73
Discharge: HOME | End: 2023-04-05
Admitting: STUDENT IN AN ORGANIZED HEALTH CARE EDUCATION/TRAINING PROGRAM
Payer: MEDICARE

## 2023-04-05 VITALS
RESPIRATION RATE: 16 BRPM | HEIGHT: 64 IN | HEART RATE: 66 BPM | SYSTOLIC BLOOD PRESSURE: 110 MMHG | DIASTOLIC BLOOD PRESSURE: 64 MMHG | WEIGHT: 159 LBS | BODY MASS INDEX: 27.14 KG/M2 | OXYGEN SATURATION: 98 %

## 2023-04-05 DIAGNOSIS — I25.810 CORONARY ARTERY DISEASE INVOLVING AUTOLOGOUS ARTERY CORONARY BYPASS GRAFT WITHOUT ANGINA PECTORIS: Primary | ICD-10-CM

## 2023-04-05 DIAGNOSIS — R06.09 DYSPNEA ON EXERTION: ICD-10-CM

## 2023-04-05 DIAGNOSIS — I25.810 CORONARY ARTERY DISEASE INVOLVING AUTOLOGOUS ARTERY CORONARY BYPASS GRAFT WITHOUT ANGINA PECTORIS: ICD-10-CM

## 2023-04-05 DIAGNOSIS — R00.2 PALPITATIONS: ICD-10-CM

## 2023-04-05 DIAGNOSIS — I10 BENIGN ESSENTIAL HYPERTENSION: ICD-10-CM

## 2023-04-05 PROCEDURE — 1159F MED LIST DOCD IN RCRD: CPT | Performed by: STUDENT IN AN ORGANIZED HEALTH CARE EDUCATION/TRAINING PROGRAM

## 2023-04-05 PROCEDURE — 99204 OFFICE O/P NEW MOD 45 MIN: CPT | Performed by: STUDENT IN AN ORGANIZED HEALTH CARE EDUCATION/TRAINING PROGRAM

## 2023-04-05 PROCEDURE — 1160F RVW MEDS BY RX/DR IN RCRD: CPT | Performed by: STUDENT IN AN ORGANIZED HEALTH CARE EDUCATION/TRAINING PROGRAM

## 2023-04-05 PROCEDURE — 3074F SYST BP LT 130 MM HG: CPT | Performed by: STUDENT IN AN ORGANIZED HEALTH CARE EDUCATION/TRAINING PROGRAM

## 2023-04-05 PROCEDURE — 93000 ELECTROCARDIOGRAM COMPLETE: CPT | Performed by: STUDENT IN AN ORGANIZED HEALTH CARE EDUCATION/TRAINING PROGRAM

## 2023-04-05 PROCEDURE — 3078F DIAST BP <80 MM HG: CPT | Performed by: STUDENT IN AN ORGANIZED HEALTH CARE EDUCATION/TRAINING PROGRAM

## 2023-04-05 PROCEDURE — 93226 XTRNL ECG REC<48 HR SCAN A/R: CPT

## 2023-04-05 PROCEDURE — 93225 XTRNL ECG REC<48 HRS REC: CPT

## 2023-04-05 RX ORDER — ROSUVASTATIN CALCIUM 20 MG/1
20 TABLET, COATED ORAL NIGHTLY
Qty: 90 TABLET | Refills: 3 | Status: SHIPPED | OUTPATIENT
Start: 2023-04-05 | End: 2024-04-04

## 2023-04-05 NOTE — PROGRESS NOTES
[facility]    Subjective:     Encounter Date:04/05/23      Patient ID: Colleen Lovell is a 72 y.o. female.    Chief Complaint: No chief complaint on file.     History of Present Illness    Ms. Lovell is a pleasant 72-year-old lady past medical history CAD status post CABG 1996, hypertension, hyperlipidemia who presents for further evaluation of chest pain. She presents today for an ER follow-up after an episode of chest pain.    She was seen in the ED on March 26, 2023.  She had a cough of productive of clear to white sputum over the last 2 weeks and then she started develop some chest pressure episodes that happen briefly and just for a few seconds and then go away, it was thought to to be musculoskeletal in origin.  She had high sensitive bones that were checked at the time of presentation, and they were 6, and then undetectably low.  Low risk for future cardiac events.    She states that she had 1 brief spell, a stabbing-like sensation under her left breast, that occurred since her ER discharge, but she has not had any symptoms.  No new shortness of breath, no chest pain.  She reports that her prior anginal equivalent was significant dyspnea on exertion, and her symptoms related that are unchanged.    Cardiac history:    CAD status post CABG 1996    She underwent a stress PET at [facility] on October 19, 2020.  It showed a medium sized area of moderate to severe anterolateral ischemia with normal LV systolic function.  She subsequently underwent a cardiac catheterization on October 29, 2020.    Patent saphenous graft to LAD and OM, occluded saphenous graft to diagonal  Mild nonobstructive disease of RCA   of LAD  Normal LV systolic function  The abnormal stress test was thought secondary to the occluded diagonal     The following portions of the patient's history were reviewed and updated as appropriate: allergies, current medications, past family history, past medical history, past  "social history, past surgical history and problem list.    Past Medical History:   Diagnosis Date   • Abnormal liver function test    • Arthritis    • CAD (coronary artery disease)     , s/p CABG, \"-\"PET scan 2011, reversible ischemia on Lexiscan PET perfusion study  Dr.Janet Ocampo   • Cholelithiasis      CT   • Chronic UTI    • GERD (gastroesophageal reflux disease)    • Heart disease    • Herpes simplex type 1 infection     orolabial   • Herpes zoster     2010   • Hx of bone scan      11: sp -1.8, hip -0.5, fem neck -0.8 14: sp -1.7, hip -0.6, fem neck -1.0   • Hyperlipidemia    • Incidental lung nodule, less than or equal to 3mm     CTPET 2016 [place], no f/u needed   • Insomnia    • Internal hemorrhoids    • Obstructive sleep apnea     mild    • Osteopenia     Dexa 11 sp -1.8 hip -0.5 fem neck -0.8   • Papanicolaou smear     2011   • Pregnancy        • Rotator cuff tendinitis        • Tubular adenoma of colon     , , cecal polyp, 2012, , adenoma with mild dysplasia 4 mm ascending colon       Past Surgical History:   Procedure Laterality Date   • BLADDER SUSPENSION     • BREAST IMPLANT SURGERY     • COLONOSCOPY        - ascending colon adenoma with mild dysplasia.    • COLONOSCOPY  2015    Dr Coyle - patient is to follow up in 2020   • CORONARY ARTERY BYPASS GRAFT     • LAPAROSCOPIC TUBAL LIGATION     • ROTATOR CUFF REPAIR Left            ECG 12 Lead    Date/Time: 2023 11:35 AM  Performed by: Gabriel Bee MD  Authorized by: Gabriel Bee MD   Comparison: compared with previous ECG from 3/26/2023  Similar to previous ECG  Comparison to previous ECG: When compared to prior EKG, PVCs no longer evident on today's EKG  Rhythm: sinus rhythm  Rate: normal  Conduction: conduction normal  ST Segments: ST segments normal  T Waves: T waves normal  QRS axis: normal  Other findings: " "non-specific ST-T wave changes    Clinical impression: non-specific ECG               Objective:     Vitals:    04/05/23 1036   BP: 110/64   Pulse: 66   Resp: 16   SpO2: 98%   Weight: 72.1 kg (159 lb)   Height: 162.6 cm (64\")         Constitutional:       Appearance: Healthy appearance. Not in distress.   Neck:      Vascular: JVD normal.   Pulmonary:      Effort: Pulmonary effort is normal.      Breath sounds: Normal breath sounds.   Cardiovascular:      PMI at left midclavicular line. Normal rate. Regular rhythm. Normal S2.      Murmurs: There is no murmur.   Pulses:     Intact distal pulses.   Edema:     Peripheral edema absent.   Skin:     General: Skin is warm and dry.   Neurological:      General: No focal deficit present.      Mental Status: Alert, oriented to person, place, and time and oriented to person, place and time.   Psychiatric:         Mood and Affect: Mood and affect normal.         Lab Review:     Lipid Panel    Lipid Panel 9/30/22 4/3/23   Total Cholesterol 141 159   Triglycerides 67 74   HDL Cholesterol 61 (A) 64 (A)   VLDL Cholesterol 14 14   LDL Cholesterol  66 81   (A) Abnormal value       Comments are available for some flowsheets but are not being displayed.           BUN   Date Value Ref Range Status   04/03/2023 11 8 - 23 mg/dL Final   03/26/2023 14 8 - 23 mg/dL Final     Creatinine   Date Value Ref Range Status   04/03/2023 0.86 0.57 - 1.00 mg/dL Final   03/26/2023 0.75 0.57 - 1.00 mg/dL Final     Potassium   Date Value Ref Range Status   04/03/2023 4.2 3.5 - 5.2 mmol/L Final   03/26/2023 4.0 3.5 - 5.2 mmol/L Final     ALT (SGPT)   Date Value Ref Range Status   04/03/2023 18 1 - 33 U/L Final   03/26/2023 16 1 - 33 U/L Final     AST (SGOT)   Date Value Ref Range Status   04/03/2023 16 1 - 32 U/L Final   03/26/2023 20 1 - 32 U/L Final     I reviewed her lipid panel from September 30, 2022:  LDL 66  HDL 61  Triglycerides 67    A1c 5.8  Performed        Assessment:          Diagnosis Plan "   1. Coronary artery disease involving autologous artery coronary bypass graft without angina pectoris  Adult Transthoracic Echo Complete w/ Color, Spectral and Contrast if necessary per protocol    Holter Monitor - 24 Hour    rosuvastatin (CRESTOR) 20 MG tablet      2. Benign essential hypertension        3. Dyspnea on exertion  Adult Transthoracic Echo Complete w/ Color, Spectral and Contrast if necessary per protocol    Holter Monitor - 24 Hour    rosuvastatin (CRESTOR) 20 MG tablet      4. Palpitations  Holter Monitor - 24 Hour             Plan:         1. CAD status post CABG: Cardiac anatomy as depicted above  2. Dyspnea on exertion, her anginal equivalent  1. Last cath at [facility] in 2020, showed an occluded SVG to diagonal, resulting in a positive stress test, medical therapy was advised.  2. Currently, her symptoms do not sound consistent with angina, seems more musculoskeletal origin  3. Her troponins were undetectably low, essentially ruling out a cardiac cause of her symptoms and presentation complaint to the ED.  Patient reassured.  No need for further stress testing at this time, as it would likely still be positive, it would be of low utility.  4. We will focus on continue medical therapy  5. Target LDL less than 55 given her moderately elevated risk.  Most recently was 83.  Increase Crestor to 20  6. Given her dyspnea, will obtain an echo  7. Increase her Imdur to 60, if she notices improvement in her dyspnea, patient was instructed to increase it to 90 in 1 week, monitor blood pressures.  If she notes no improvement with increased dose, she can revert back to 30  8. Continue beta-blocker and ACE  9. Aspirin 81 for life  3. Hyperlipidemia: Increasing Crestor per above.  4. Hypertension: Very well controlled today, continue current regimen.  5. PVCs noted on telemetry while in the ED, on EKG.   1. Quantify burden with Holter.  It seems they remain mostly asymptomatic.  Did not seem to be associated with  her musculoskeletal chest pain.    Thank you for allowing me to participate in the care of Colleen Lovell. Feel free to contact me directly with any further questions or concerns.    RTC 6 months for symptom check-in.  We will obtain echocardiogram and increasing medical therapy per above in interim.    Gabriel Bee MD  [facility]  04/05/23  10:31 EDT       Current Outpatient Medications:   •  aspirin 81 MG EC tablet, Take  by mouth., Disp: , Rfl:   •  budesonide-formoterol (Symbicort) 160-4.5 MCG/ACT inhaler, Inhale 2 puffs 2 (Two) Times a Day., Disp: 3 each, Rfl: 3  •  Calcium Carb-Cholecalciferol (Calcium 1000 + D) 1000-800 MG-UNIT tablet, Daily., Disp: , Rfl:   •  Cetirizine HCl 10 MG capsule, Take  by mouth., Disp: , Rfl:   •  Cholecalciferol (VITAMIN D3) 5000 UNITS capsule capsule, Take 1,000 Units by mouth daily., Disp: , Rfl:   •  Cobalamin Combinations (B-12) 100-5000 MCG sublingual tablet, Daily., Disp: , Rfl:   •  Denosumab (PROLIA SC), Inject  under the skin into the appropriate area as directed Every 6 (Six) Months., Disp: , Rfl:   •  estradiol (ESTRACE) 0.1 MG/GM vaginal cream, Insert 2 g into the vagina 3 (Three) Times a Week., Disp: 42.5 g, Rfl: 3  •  famotidine (PEPCID) 20 MG tablet, Take 1 tablet by mouth Every Night., Disp: , Rfl:   •  fluticasone (FLONASE) 50 MCG/ACT nasal spray, , Disp: , Rfl:   •  isosorbide mononitrate (IMDUR) 30 MG 24 hr tablet, TAKE 1 TABLET EVERY DAY, Disp: 90 tablet, Rfl: 3  •  lansoprazole (PREVACID) 30 MG capsule, TAKE 1 CAPSULE EVERY DAY, Disp: 90 capsule, Rfl: 3  •  lisinopril (PRINIVIL,ZESTRIL) 2.5 MG tablet, TAKE 1 TABLET EVERY DAY, Disp: 90 tablet, Rfl: 3  •  metoprolol tartrate (LOPRESSOR) 25 MG tablet, TAKE 1 TABLET TWICE DAILY, Disp: 180 tablet, Rfl: 3  •  Multiple Vitamin (MULTI VITAMIN DAILY PO), Take  by mouth., Disp: , Rfl:   •  Omega-3 Fatty Acids (FISH OIL) 1200 MG capsule capsule, Take  by mouth., Disp: , Rfl:   •  PROAIR  (90  Base) MCG/ACT inhaler, , Disp: , Rfl:   •  Probiotic Product (PROBIOTIC-10) capsule, , Disp: , Rfl:   •  traZODone (DESYREL) 100 MG tablet, TAKE 1 TABLET BY MOUTH EVERY NIGHT., Disp: 90 tablet, Rfl: 3  •  VALACYCLOVIR HCL PO, As Needed., Disp: , Rfl:   •  rosuvastatin (CRESTOR) 20 MG tablet, Take 1 tablet by mouth Every Night., Disp: 90 tablet, Rfl: 3         Return in about 6 months (around 10/5/2023).      Part of this note may be an electronic transcription/translation of spoken language to printed text using the Dragon Dictation System.

## 2023-04-05 NOTE — PATIENT INSTRUCTIONS
We will obtain a heart ultrasound to evaluate for any new changes to explain your symptoms.     We will check the monitor to make sure the PVCs are not causing any problems.    Please increase your crestor medication to 20mg, this is best taken at night. The goal bad cholesterol LDL for you is 55, yours was a bit higher and this should do the trick.    For shortness of breath, I would try a higher dose of isosorbide mononitrate, you can take two of your 30mg tablets both in the morning and see if the higher dose 60mg helps alleviate some of your symptoms. If it does, then after a week, you can increase it to 90mg. Keep an eye on youir BP when you do this. Let us know if this works and we can change the dose in our medication list. If it does not help, you can go back to 30mg.

## 2023-04-10 ENCOUNTER — OFFICE VISIT (OUTPATIENT)
Dept: INTERNAL MEDICINE | Facility: CLINIC | Age: 73
End: 2023-04-10
Payer: MEDICARE

## 2023-04-10 VITALS
HEIGHT: 64 IN | TEMPERATURE: 98.4 F | DIASTOLIC BLOOD PRESSURE: 60 MMHG | SYSTOLIC BLOOD PRESSURE: 106 MMHG | BODY MASS INDEX: 27.31 KG/M2 | HEART RATE: 80 BPM | RESPIRATION RATE: 18 BRPM | WEIGHT: 160 LBS | OXYGEN SATURATION: 99 %

## 2023-04-10 DIAGNOSIS — B37.0 THRUSH, ORAL: ICD-10-CM

## 2023-04-10 DIAGNOSIS — I25.810 CORONARY ARTERY DISEASE INVOLVING AUTOLOGOUS ARTERY CORONARY BYPASS GRAFT WITHOUT ANGINA PECTORIS: Primary | ICD-10-CM

## 2023-04-10 DIAGNOSIS — R73.03 PREDIABETES: ICD-10-CM

## 2023-04-10 DIAGNOSIS — J45.40 MODERATE PERSISTENT ASTHMA WITHOUT COMPLICATION: ICD-10-CM

## 2023-04-10 DIAGNOSIS — Z00.00 ROUTINE HEALTH MAINTENANCE: ICD-10-CM

## 2023-04-10 DIAGNOSIS — B00.1 HERPES LABIALIS: ICD-10-CM

## 2023-04-10 DIAGNOSIS — F51.01 PRIMARY INSOMNIA: ICD-10-CM

## 2023-04-10 DIAGNOSIS — N39.0 URINARY TRACT INFECTION, RECURRENT: ICD-10-CM

## 2023-04-10 DIAGNOSIS — J30.89 NON-SEASONAL ALLERGIC RHINITIS, UNSPECIFIED TRIGGER: ICD-10-CM

## 2023-04-10 DIAGNOSIS — K21.9 LARYNGOPHARYNGEAL REFLUX: ICD-10-CM

## 2023-04-10 DIAGNOSIS — Z86.010 HISTORY OF ADENOMATOUS POLYP OF COLON: ICD-10-CM

## 2023-04-10 DIAGNOSIS — M81.0 AGE-RELATED OSTEOPOROSIS WITHOUT CURRENT PATHOLOGICAL FRACTURE: ICD-10-CM

## 2023-04-10 RX ORDER — PANTOPRAZOLE SODIUM 40 MG/1
40 TABLET, DELAYED RELEASE ORAL DAILY
Qty: 90 TABLET | Refills: 3 | Status: SHIPPED | OUTPATIENT
Start: 2023-04-10

## 2023-04-10 NOTE — PROGRESS NOTES
Colleen Lovell is a 72 y.o. female, who presents with a chief complaint of   Chief Complaint   Patient presents with   • Coronary Artery Disease     6 month follow up   • Hypertension       Hypertension    Hyperlipidemia    Coronary Artery Disease  Risk factors include hyperlipidemia.   Insomnia  Associated symptoms include arthralgias.      1.  CAD.  She had CABG X 3 at age 46.  Coronary catheterization 10/2020 showed occlusion of LAD diagonal, with the plan to treat medically. Tolerating rosuvastatin, isosorbide, ASA, beta blocker, ace-inhibitor.  She sees Dr. Rebecca Ocampo.    2. Thrush.  She was treated at urgent care with nystatin but symptoms are returning.  She says she hasn't been rinsing well after Advair.    3. Osteoporosis.  She is having increased muscle cramps, which she attributes to Prolia.  Due for next dose tomorrow.  Due for Dexa scan now.    The following portions of the patient's history were reviewed and updated as appropriate: allergies, current medications, past family history, past medical history, past social history, past surgical history and problem list.    Allergies: Patient has no known allergies.    Review of Systems   Constitutional: Negative.    Eyes: Negative.    Respiratory: Negative.    Cardiovascular: Negative.    Gastrointestinal: Negative.    Endocrine: Negative.    Genitourinary: Positive for frequency.   Musculoskeletal: Positive for arthralgias.   Skin: Negative.    Allergic/Immunologic: Positive for environmental allergies.   Neurological: Negative.    Hematological: Negative.    Psychiatric/Behavioral: Positive for sleep disturbance. The patient has insomnia.              Wt Readings from Last 3 Encounters:   04/10/23 72.6 kg (160 lb)   04/05/23 72.1 kg (159 lb)   03/26/23 70.3 kg (155 lb)     Temp Readings from Last 3 Encounters:   04/10/23 98.4 °F (36.9 °C) (Infrared)   03/26/23 98.1 °F (36.7 °C) (Oral)   10/10/22 97.4 °F (36.3 °C) (Temporal)     BP Readings from  Last 3 Encounters:   04/10/23 106/60   04/05/23 110/64   03/26/23 130/65     Pulse Readings from Last 3 Encounters:   04/10/23 80   04/05/23 66   03/26/23 64     Body mass index is 27.46 kg/m².  @LASTSAO2(3)@    Physical Exam  Vitals and nursing note reviewed.   Constitutional:       Appearance: Normal appearance.   HENT:      Head: Normocephalic and atraumatic.      Nose: Nose normal.      Mouth/Throat:      Mouth: Mucous membranes are moist.   Eyes:      Extraocular Movements: Extraocular movements intact.      Conjunctiva/sclera: Conjunctivae normal.   Cardiovascular:      Rate and Rhythm: Normal rate and regular rhythm.      Heart sounds: No murmur heard.  Pulmonary:      Effort: Pulmonary effort is normal.      Breath sounds: Normal breath sounds.   Abdominal:      Palpations: Abdomen is soft.      Tenderness: There is no abdominal tenderness.   Musculoskeletal:      Cervical back: Neck supple. No rigidity.      Right lower leg: No edema.      Left lower leg: No edema.   Skin:     General: Skin is warm and dry.   Neurological:      General: No focal deficit present.      Mental Status: She is alert and oriented to person, place, and time.   Psychiatric:         Mood and Affect: Mood normal.         Behavior: Behavior normal.             Diagnoses and all orders for this visit:    1. Coronary artery disease involving autologous artery coronary bypass graft without angina pectoris (Primary)    2. Non-seasonal allergic rhinitis, unspecified trigger    3. Laryngopharyngeal reflux    4. Prediabetes    5. Age-related osteoporosis without current pathological fracture  -     DEXA Bone Density Axial; Future    6. Urinary tract infection, recurrent    7. Primary insomnia    8. History of adenomatous polyp of colon    9. Moderate persistent asthma without complication    10. Herpes labialis    11. Routine health maintenance    12. Thrush, oral  -     nystatin (MYCOSTATIN) 100,000 unit/mL suspension; Swish and swallow 5  mL 4 (Four) Times a Day for 7 days.  Dispense: 140 mL; Refill: 0    Other orders  -     pantoprazole (Protonix) 40 MG EC tablet; Take 1 tablet by mouth Daily.  Dispense: 90 tablet; Refill: 3      1. CAD.  On ASA, isosorbide, lisinopril, metoprolol, rosuvastatin.  Per Dr. Rebecca Ocampo.    2. MAILE. Controlled with cetirizine and fluticasone nasal spray.     3. GERD.  Controlled with lansoprazole 30 mg BID and Tums.  Insurance coverage changed so we need to switch to pantoprazole    4. Prediabetes.  A1c stable at 5.8.  Continue lifestyle measures.    5. Osteoporosis.  Didn't tolerate alendronate.  Hold Prolia for now due to muscle cramps.  We will re-evaluate need for medication after upcoming Dexa scan, which is ordered.      6. Recurrent UTI.  She is doing well with vaginal estrogen per Dr. Turcios.  She took daily cephalexin for 3 months.    7. Insomnia.  Continue trazodone.  Doing well with this and lifestyle measures.    8. History of colon polyp.  Had colonoscopy June 2020 by Dr. Coyle.  Next due in 5 years, 6/2025.    9. Asthma, moderate persistent.  Controlled with Advair.  Switching to Symbicort due to insurance coverage.    10. Herpes labialis.  Refilled valacyclovir.    11. Routine health maint.  Pneumococcal vaccines UTD.  Shingrix done at pharmacy.  Mammogram UTD.  Covid-19 vaccine done.    12. Oral thrush.  Rinse after Symbicort.  Treat with nystatin swish and swallow.          Outpatient Medications Prior to Visit   Medication Sig Dispense Refill   • aspirin 81 MG EC tablet Take  by mouth.     • budesonide-formoterol (Symbicort) 160-4.5 MCG/ACT inhaler Inhale 2 puffs 2 (Two) Times a Day. 3 each 3   • Calcium Carb-Cholecalciferol (Calcium 1000 + D) 1000-800 MG-UNIT tablet Daily.     • Cetirizine HCl 10 MG capsule Take  by mouth.     • Cholecalciferol (VITAMIN D3) 5000 UNITS capsule capsule Take 1,000 Units by mouth daily.     • Cobalamin Combinations (B-12) 100-5000 MCG sublingual tablet Daily.      • Denosumab (PROLIA SC) Inject  under the skin into the appropriate area as directed Every 6 (Six) Months.     • estradiol (ESTRACE) 0.1 MG/GM vaginal cream Insert 2 g into the vagina 3 (Three) Times a Week. 42.5 g 3   • famotidine (PEPCID) 20 MG tablet Take 1 tablet by mouth Every Night.     • fluticasone (FLONASE) 50 MCG/ACT nasal spray      • isosorbide mononitrate (IMDUR) 30 MG 24 hr tablet TAKE 1 TABLET EVERY DAY (Patient taking differently: Take 2 tablets by mouth Daily.) 90 tablet 3   • lisinopril (PRINIVIL,ZESTRIL) 2.5 MG tablet TAKE 1 TABLET EVERY DAY 90 tablet 3   • metoprolol tartrate (LOPRESSOR) 25 MG tablet TAKE 1 TABLET TWICE DAILY 180 tablet 3   • Multiple Vitamin (MULTI VITAMIN DAILY PO) Take  by mouth.     • Omega-3 Fatty Acids (FISH OIL) 1200 MG capsule capsule Take  by mouth.     • PROAIR  (90 Base) MCG/ACT inhaler      • Probiotic Product (PROBIOTIC-10) capsule      • rosuvastatin (CRESTOR) 20 MG tablet Take 1 tablet by mouth Every Night. 90 tablet 3   • traZODone (DESYREL) 100 MG tablet TAKE 1 TABLET BY MOUTH EVERY NIGHT. 90 tablet 3   • VALACYCLOVIR HCL PO As Needed.     • lansoprazole (PREVACID) 30 MG capsule TAKE 1 CAPSULE EVERY DAY 90 capsule 3     No facility-administered medications prior to visit.     New Medications Ordered This Visit   Medications   • pantoprazole (Protonix) 40 MG EC tablet     Sig: Take 1 tablet by mouth Daily.     Dispense:  90 tablet     Refill:  3   • nystatin (MYCOSTATIN) 100,000 unit/mL suspension     Sig: Swish and swallow 5 mL 4 (Four) Times a Day for 7 days.     Dispense:  140 mL     Refill:  0     [unfilled]  Medications Discontinued During This Encounter   Medication Reason   • lansoprazole (PREVACID) 30 MG capsule          Return in about 6 months (around 10/10/2023) for Medicare Wellness.      Answers for HPI/ROS submitted by the patient on 4/3/2023  What is the primary reason for your visit?: High Blood Pressure

## 2023-04-11 ENCOUNTER — HOSPITAL ENCOUNTER (OUTPATIENT)
Dept: INFUSION THERAPY | Facility: HOSPITAL | Age: 73
Discharge: HOME | End: 2023-04-11
Payer: MEDICARE

## 2023-04-11 DIAGNOSIS — M81.0 AGE-RELATED OSTEOPOROSIS WITHOUT CURRENT PATHOLOGICAL FRACTURE: Primary | ICD-10-CM

## 2023-04-11 LAB
MAXIMAL PREDICTED HEART RATE: 149 BPM
STRESS TARGET HR: 127 BPM

## 2023-04-21 ENCOUNTER — APPOINTMENT (OUTPATIENT)
Dept: BONE DENSITY | Facility: HOSPITAL | Age: 73
End: 2023-04-21
Payer: MEDICARE

## 2023-04-21 DIAGNOSIS — M81.0 AGE-RELATED OSTEOPOROSIS WITHOUT CURRENT PATHOLOGICAL FRACTURE: ICD-10-CM

## 2023-04-21 PROCEDURE — 77080 DXA BONE DENSITY AXIAL: CPT

## 2023-05-02 ENCOUNTER — HOSPITAL ENCOUNTER (OUTPATIENT)
Dept: CARDIOLOGY | Facility: HOSPITAL | Age: 73
Discharge: HOME | End: 2023-05-02
Admitting: STUDENT IN AN ORGANIZED HEALTH CARE EDUCATION/TRAINING PROGRAM
Payer: MEDICARE

## 2023-05-02 VITALS
SYSTOLIC BLOOD PRESSURE: 110 MMHG | BODY MASS INDEX: 27.31 KG/M2 | HEIGHT: 64 IN | DIASTOLIC BLOOD PRESSURE: 64 MMHG | WEIGHT: 160 LBS

## 2023-05-02 DIAGNOSIS — I25.810 CORONARY ARTERY DISEASE INVOLVING AUTOLOGOUS ARTERY CORONARY BYPASS GRAFT WITHOUT ANGINA PECTORIS: ICD-10-CM

## 2023-05-02 DIAGNOSIS — R06.09 DYSPNEA ON EXERTION: ICD-10-CM

## 2023-05-02 LAB
AORTIC DIMENSIONLESS INDEX: 0.8 (DI)
BH CV ECHO MEAS - ACS: 1.31 CM
BH CV ECHO MEAS - AO MAX PG: 5.2 MMHG
BH CV ECHO MEAS - AO MEAN PG: 3 MMHG
BH CV ECHO MEAS - AO ROOT DIAM: 3.2 CM
BH CV ECHO MEAS - AO V2 MAX: 114 CM/SEC
BH CV ECHO MEAS - AO V2 VTI: 24.9 CM
BH CV ECHO MEAS - AVA(I,D): 2.35 CM2
BH CV ECHO MEAS - EDV(CUBED): 117.6 ML
BH CV ECHO MEAS - EDV(MOD-SP2): 91 ML
BH CV ECHO MEAS - EDV(MOD-SP4): 78 ML
BH CV ECHO MEAS - EF(MOD-BP): 56.5 %
BH CV ECHO MEAS - EF(MOD-SP2): 56 %
BH CV ECHO MEAS - EF(MOD-SP4): 59 %
BH CV ECHO MEAS - ESV(CUBED): 47.6 ML
BH CV ECHO MEAS - ESV(MOD-SP2): 40 ML
BH CV ECHO MEAS - ESV(MOD-SP4): 32 ML
BH CV ECHO MEAS - FS: 26.1 %
BH CV ECHO MEAS - IVS/LVPW: 1.14 CM
BH CV ECHO MEAS - IVSD: 0.8 CM
BH CV ECHO MEAS - LAT PEAK E' VEL: 11.1 CM/SEC
BH CV ECHO MEAS - LV DIASTOLIC VOL/BSA (35-75): 43.8 CM2
BH CV ECHO MEAS - LV MASS(C)D: 120.8 GRAMS
BH CV ECHO MEAS - LV MAX PG: 3.2 MMHG
BH CV ECHO MEAS - LV MEAN PG: 2 MMHG
BH CV ECHO MEAS - LV SYSTOLIC VOL/BSA (12-30): 18 CM2
BH CV ECHO MEAS - LV V1 MAX: 89.1 CM/SEC
BH CV ECHO MEAS - LV V1 VTI: 19.3 CM
BH CV ECHO MEAS - LVIDD: 4.9 CM
BH CV ECHO MEAS - LVIDS: 3.6 CM
BH CV ECHO MEAS - LVOT AREA: 3 CM2
BH CV ECHO MEAS - LVOT DIAM: 1.97 CM
BH CV ECHO MEAS - LVPWD: 0.7 CM
BH CV ECHO MEAS - MED PEAK E' VEL: 8.9 CM/SEC
BH CV ECHO MEAS - MR MAX PG: 59.3 MMHG
BH CV ECHO MEAS - MR MAX VEL: 385 CM/SEC
BH CV ECHO MEAS - MV A MAX VEL: 85.4 CM/SEC
BH CV ECHO MEAS - MV DEC SLOPE: 273.4 CM/SEC2
BH CV ECHO MEAS - MV DEC TIME: 0.25 MSEC
BH CV ECHO MEAS - MV E MAX VEL: 66 CM/SEC
BH CV ECHO MEAS - MV E/A: 0.77
BH CV ECHO MEAS - MV MAX PG: 3.1 MMHG
BH CV ECHO MEAS - MV MEAN PG: 1.58 MMHG
BH CV ECHO MEAS - MV P1/2T: 96.3 MSEC
BH CV ECHO MEAS - MV V2 VTI: 28.5 CM
BH CV ECHO MEAS - MVA(P1/2T): 2.29 CM2
BH CV ECHO MEAS - MVA(VTI): 2.05 CM2
BH CV ECHO MEAS - QP/QS: 0.78
BH CV ECHO MEAS - RAP SYSTOLE: 3 MMHG
BH CV ECHO MEAS - RV MAX PG: 2.37 MMHG
BH CV ECHO MEAS - RV V1 MAX: 77 CM/SEC
BH CV ECHO MEAS - RV V1 VTI: 15.5 CM
BH CV ECHO MEAS - RVOT DIAM: 1.94 CM
BH CV ECHO MEAS - RVSP: 22.6 MMHG
BH CV ECHO MEAS - SI(MOD-SP2): 28.7 ML/M2
BH CV ECHO MEAS - SI(MOD-SP4): 25.9 ML/M2
BH CV ECHO MEAS - SV(LVOT): 58.6 ML
BH CV ECHO MEAS - SV(MOD-SP2): 51 ML
BH CV ECHO MEAS - SV(MOD-SP4): 46 ML
BH CV ECHO MEAS - SV(RVOT): 45.8 ML
BH CV ECHO MEAS - TR MAX PG: 19.6 MMHG
BH CV ECHO MEAS - TR MAX VEL: 221.4 CM/SEC
BH CV ECHO MEASUREMENTS AVERAGE E/E' RATIO: 6.6
BH CV XLRA - TDI S': 9.9 CM/SEC
LEFT ATRIUM VOLUME INDEX: 15.3 ML/M2
MAXIMAL PREDICTED HEART RATE: 149 BPM
SINUS: 2.8 CM
STRESS TARGET HR: 127 BPM

## 2023-05-02 PROCEDURE — 25510000001 PERFLUTREN (DEFINITY) 8.476 MG IN SODIUM CHLORIDE (PF) 0.9 % 10 ML INJECTION: Performed by: FAMILY MEDICINE

## 2023-05-02 PROCEDURE — 93306 TTE W/DOPPLER COMPLETE: CPT | Performed by: INTERNAL MEDICINE

## 2023-05-02 PROCEDURE — 93306 TTE W/DOPPLER COMPLETE: CPT

## 2023-05-02 RX ADMIN — SODIUM CHLORIDE 2 ML: 9 INJECTION INTRAMUSCULAR; INTRAVENOUS; SUBCUTANEOUS at 16:29

## 2023-05-03 ENCOUNTER — TELEPHONE (OUTPATIENT)
Dept: CARDIOLOGY | Facility: CLINIC | Age: 73
End: 2023-05-03
Payer: MEDICARE

## 2023-05-03 NOTE — TELEPHONE ENCOUNTER
Notified patient of results/recommendations. Patient verbalized understanding.    Rose Rodgers RN  Triage [facility]

## 2023-05-03 NOTE — TELEPHONE ENCOUNTER
Please inform patient that her echo show smild age related changes but her heart is strong and she has no concerning valve disease. Her echo is stable for her to follow up with Dr. Bee as scheduled

## 2023-07-13 PROBLEM — F32.9 REACTIVE DEPRESSION: Status: ACTIVE | Noted: 2023-07-13

## 2023-08-17 ENCOUNTER — OFFICE VISIT (OUTPATIENT)
Dept: INTERNAL MEDICINE | Facility: CLINIC | Age: 73
End: 2023-08-17
Payer: MEDICARE

## 2023-08-17 VITALS
TEMPERATURE: 98 F | SYSTOLIC BLOOD PRESSURE: 102 MMHG | DIASTOLIC BLOOD PRESSURE: 64 MMHG | RESPIRATION RATE: 18 BRPM | WEIGHT: 160 LBS | BODY MASS INDEX: 27.31 KG/M2 | HEIGHT: 64 IN | OXYGEN SATURATION: 98 % | HEART RATE: 71 BPM

## 2023-08-17 DIAGNOSIS — F32.9 REACTIVE DEPRESSION: Primary | ICD-10-CM

## 2023-08-17 PROCEDURE — 3074F SYST BP LT 130 MM HG: CPT | Performed by: FAMILY MEDICINE

## 2023-08-17 PROCEDURE — 99213 OFFICE O/P EST LOW 20 MIN: CPT | Performed by: FAMILY MEDICINE

## 2023-08-17 PROCEDURE — 1159F MED LIST DOCD IN RCRD: CPT | Performed by: FAMILY MEDICINE

## 2023-08-17 PROCEDURE — 1160F RVW MEDS BY RX/DR IN RCRD: CPT | Performed by: FAMILY MEDICINE

## 2023-08-17 PROCEDURE — 3078F DIAST BP <80 MM HG: CPT | Performed by: FAMILY MEDICINE

## 2023-08-17 RX ORDER — ESCITALOPRAM OXALATE 20 MG/1
20 TABLET ORAL DAILY
Qty: 90 TABLET | Refills: 1 | Status: SHIPPED | OUTPATIENT
Start: 2023-08-17

## 2023-08-17 NOTE — PROGRESS NOTES
Subjective   Colleen Lovell is a 72 y.o. female presenting today for follow up of   Chief Complaint   Patient presents with    Reactive depression     4 week follow up        History of Present Illness     Pt here for 4 week f/u on reactive depression.  She started escitalopram 10 mg daily.  She reports that she is tolerating this very well.  She states she is feeling much better, but is interested in increasing the dose.  She still has the stress and burden of her life circumstance but is handling it better.  She has support from friends and family.    Patient Active Problem List   Diagnosis    Atopic rhinitis    Herpes simplex type 1 infection    Impaired fasting glucose    Insomnia    Menopausal symptom    Age-related osteoporosis without current pathological fracture    Laryngopharyngeal reflux    Urinary tract infection, recurrent    Routine health maintenance    CAD (coronary artery disease)    Benign essential hypertension    Diastolic dysfunction    History of adenomatous polyp of colon    Nocturia    Prediabetes    Moderate persistent asthma without complication    Herpes labialis    Reactive depression       Current Outpatient Medications on File Prior to Visit   Medication Sig    aspirin 81 MG EC tablet Take  by mouth.    budesonide-formoterol (Symbicort) 160-4.5 MCG/ACT inhaler Inhale 2 puffs 2 (Two) Times a Day.    Calcium Carb-Cholecalciferol (Calcium 1000 + D) 1000-800 MG-UNIT tablet Daily.    Cetirizine HCl 10 MG capsule Take  by mouth.    Cholecalciferol (VITAMIN D3) 5000 UNITS capsule capsule Take 1,000 Units by mouth daily.    estradiol (ESTRACE) 0.1 MG/GM vaginal cream Insert 2 g into the vagina 3 (Three) Times a Week.    famotidine (PEPCID) 20 MG tablet Take 1 tablet by mouth Every Night.    fluticasone (FLONASE) 50 MCG/ACT nasal spray     isosorbide mononitrate (IMDUR) 30 MG 24 hr tablet TAKE 1 TABLET EVERY DAY (Patient taking differently: Take 2 tablets by mouth Daily.)    lisinopril  "(PRINIVIL,ZESTRIL) 2.5 MG tablet TAKE 1 TABLET EVERY DAY    metoprolol tartrate (LOPRESSOR) 25 MG tablet TAKE 1 TABLET TWICE DAILY    Multiple Vitamin (MULTI VITAMIN DAILY PO) Take  by mouth.    Omega-3 Fatty Acids (FISH OIL) 1200 MG capsule capsule Take  by mouth.    pantoprazole (Protonix) 40 MG EC tablet Take 1 tablet by mouth Daily.    PROAIR  (90 Base) MCG/ACT inhaler     Probiotic Product (PROBIOTIC-10) capsule     rosuvastatin (CRESTOR) 20 MG tablet Take 1 tablet by mouth Every Night.    traZODone (DESYREL) 100 MG tablet TAKE 1 TABLET BY MOUTH EVERY NIGHT.    VALACYCLOVIR HCL PO As Needed.    [DISCONTINUED] escitalopram (Lexapro) 10 MG tablet Take 1 tablet by mouth Daily.    [DISCONTINUED] Cobalamin Combinations (B-12) 100-5000 MCG sublingual tablet Daily. (Patient not taking: Reported on 7/13/2023)    [DISCONTINUED] Denosumab (PROLIA SC) Inject  under the skin into the appropriate area as directed Every 6 (Six) Months. (Patient not taking: Reported on 7/13/2023)     No current facility-administered medications on file prior to visit.          The following portions of the patient's history were reviewed and updated as appropriate: allergies, current medications, past family history, past medical history, past social history, past surgical history and problem list.    Review of Systems   Constitutional: Negative.    Respiratory: Negative.     Cardiovascular: Negative.    Psychiatric/Behavioral:  Positive for depressed mood (improved) and stress. Negative for agitation, self-injury and suicidal ideas.      Objective   Vitals:    08/17/23 1129   BP: 102/64   BP Location: Left arm   Patient Position: Sitting   Cuff Size: Adult   Pulse: 71   Resp: 18   Temp: 98 øF (36.7 øC)   TempSrc: Infrared   SpO2: 98%   Weight: 72.6 kg (160 lb)   Height: 162.6 cm (64\")       BP Readings from Last 3 Encounters:   08/17/23 102/64   07/13/23 124/62   05/02/23 110/64        Wt Readings from Last 3 Encounters:   08/17/23 " 72.6 kg (160 lb)   07/13/23 73 kg (161 lb)   05/02/23 72.6 kg (160 lb)        Body mass index is 27.46 kg/mý.  Nursing notes and vitals reviewed.    Physical Exam  Vitals and nursing note reviewed.   Constitutional:       Appearance: Normal appearance. She is well-developed.   HENT:      Head: Normocephalic and atraumatic.      Mouth/Throat:      Mouth: Mucous membranes are moist.   Eyes:      Extraocular Movements: Extraocular movements intact.      Conjunctiva/sclera: Conjunctivae normal.   Neck:      Thyroid: No thyromegaly.   Cardiovascular:      Rate and Rhythm: Normal rate and regular rhythm.   Pulmonary:      Effort: Pulmonary effort is normal. No respiratory distress.   Musculoskeletal:      Cervical back: Neck supple. No rigidity.      Right lower leg: No edema.      Left lower leg: No edema.   Skin:     General: Skin is warm and dry.   Neurological:      General: No focal deficit present.      Mental Status: She is alert and oriented to person, place, and time.   Psychiatric:         Behavior: Behavior normal.         Thought Content: Thought content normal.         Judgment: Judgment normal.       No results found for this or any previous visit (from the past 672 hour(s)).      Assessment & Plan   Diagnoses and all orders for this visit:    1. Reactive depression (Primary)  -     escitalopram (Lexapro) 20 MG tablet; Take 1 tablet by mouth Daily.  Dispense: 90 tablet; Refill: 1      Improved.  Increase escitalopram to 20 mg daily.  F/U 2 months as scheduled.  Anticipatory guidance given.      Medications, including side effects, were discussed with the patient. Patient verbalized understanding.  The plan of care was discussed. All questions were answered. Patient verbalized understanding.      Return for Next scheduled follow up.        Answers submitted by the patient for this visit:  Other (Submitted on 8/10/2023)  Please describe your symptoms.: Depression, anxiety  Have you had these symptoms before?:  Yes  How long have you been having these symptoms?: Greater than 2 weeks  Please list any medications you are currently taking for this condition.: Lexapro 10mg  Please describe any probable cause for these symptoms. : Caring for elderly parents  Primary Reason for Visit (Submitted on 8/10/2023)  What is the primary reason for your visit?: Other

## 2023-08-29 DIAGNOSIS — I25.810 CORONARY ARTERY DISEASE INVOLVING AUTOLOGOUS ARTERY CORONARY BYPASS GRAFT WITHOUT ANGINA PECTORIS: ICD-10-CM

## 2023-08-29 DIAGNOSIS — I10 BENIGN ESSENTIAL HYPERTENSION: ICD-10-CM

## 2023-08-29 RX ORDER — LISINOPRIL 2.5 MG/1
TABLET ORAL
Qty: 90 TABLET | Refills: 3 | Status: SHIPPED | OUTPATIENT
Start: 2023-08-29

## 2023-08-29 NOTE — TELEPHONE ENCOUNTER
Rx Refill Note  Requested Prescriptions     Pending Prescriptions Disp Refills    lisinopril (PRINIVIL,ZESTRIL) 2.5 MG tablet [Pharmacy Med Name: LISINOPRIL 2.5 MG Tablet] 90 tablet 3     Sig: TAKE 1 TABLET EVERY DAY      Last office visit with prescribing clinician: 8/17/2023   Last telemedicine visit with prescribing clinician: Visit date not found   Next office visit with prescribing clinician: 10/19/2023                         Would you like a call back once the refill request has been completed: [] Yes [] No    If the office needs to give you a call back, can they leave a voicemail: [] Yes [] No    Wagner Germain, PCT  08/29/23, 16:06 EDT

## 2023-09-26 ENCOUNTER — TELEPHONE (OUTPATIENT)
Dept: INTERNAL MEDICINE | Facility: CLINIC | Age: 73
End: 2023-09-26
Payer: MEDICARE

## 2023-10-02 DIAGNOSIS — M81.0 AGE-RELATED OSTEOPOROSIS WITHOUT CURRENT PATHOLOGICAL FRACTURE: ICD-10-CM

## 2023-10-02 DIAGNOSIS — E55.9 HYPOVITAMINOSIS D: ICD-10-CM

## 2023-10-02 DIAGNOSIS — Z00.00 ROUTINE HEALTH MAINTENANCE: Primary | ICD-10-CM

## 2023-10-18 ENCOUNTER — OFFICE VISIT (OUTPATIENT)
Dept: CARDIOLOGY | Facility: CLINIC | Age: 73
End: 2023-10-18
Payer: MEDICARE

## 2023-10-18 VITALS
WEIGHT: 155.7 LBS | DIASTOLIC BLOOD PRESSURE: 60 MMHG | HEART RATE: 66 BPM | BODY MASS INDEX: 26.58 KG/M2 | HEIGHT: 64 IN | OXYGEN SATURATION: 97 % | SYSTOLIC BLOOD PRESSURE: 110 MMHG

## 2023-10-18 DIAGNOSIS — G47.33 OSA (OBSTRUCTIVE SLEEP APNEA): ICD-10-CM

## 2023-10-18 DIAGNOSIS — G47.00 INSOMNIA, UNSPECIFIED TYPE: Primary | ICD-10-CM

## 2023-10-18 DIAGNOSIS — I10 BENIGN ESSENTIAL HYPERTENSION: ICD-10-CM

## 2023-10-18 DIAGNOSIS — I25.810 CORONARY ARTERY DISEASE INVOLVING AUTOLOGOUS ARTERY CORONARY BYPASS GRAFT WITHOUT ANGINA PECTORIS: ICD-10-CM

## 2023-10-18 DIAGNOSIS — G47.10 HYPERSOMNOLENCE: ICD-10-CM

## 2023-10-18 PROCEDURE — 1160F RVW MEDS BY RX/DR IN RCRD: CPT | Performed by: STUDENT IN AN ORGANIZED HEALTH CARE EDUCATION/TRAINING PROGRAM

## 2023-10-18 PROCEDURE — 3074F SYST BP LT 130 MM HG: CPT | Performed by: STUDENT IN AN ORGANIZED HEALTH CARE EDUCATION/TRAINING PROGRAM

## 2023-10-18 PROCEDURE — 99214 OFFICE O/P EST MOD 30 MIN: CPT | Performed by: STUDENT IN AN ORGANIZED HEALTH CARE EDUCATION/TRAINING PROGRAM

## 2023-10-18 PROCEDURE — 3078F DIAST BP <80 MM HG: CPT | Performed by: STUDENT IN AN ORGANIZED HEALTH CARE EDUCATION/TRAINING PROGRAM

## 2023-10-18 PROCEDURE — 1159F MED LIST DOCD IN RCRD: CPT | Performed by: STUDENT IN AN ORGANIZED HEALTH CARE EDUCATION/TRAINING PROGRAM

## 2023-10-18 RX ORDER — ISOSORBIDE MONONITRATE 60 MG/1
60 TABLET, EXTENDED RELEASE ORAL DAILY
Qty: 90 TABLET | Refills: 3 | Status: SHIPPED | OUTPATIENT
Start: 2023-10-18 | End: 2024-10-17

## 2023-10-18 NOTE — PATIENT INSTRUCTIONS
Sleep clinic will arrange for a home sleep study to check you for STACIA. Otherwise, your heart is doing great!

## 2023-10-18 NOTE — PROGRESS NOTES
[facility]    Subjective:     Encounter Date:10/18/23      Patient ID: Colleen Lovell is a 73 y.o. female.    Chief Complaint:   Chief Complaint   Patient presents with    Coronary Artery Disease      History of Present Illness    Ms. Lovell is a pleasant 72-year-old lady past medical history CAD status post CABG 1996, hypertension, hyperlipidemia who presents for further evaluation of chest pain.  She initially presented to me to an episode of chest pain, which was likely musculoskeletal due to undetectable high-sensitivity troponins.    She was seen in the ED on March 26, 2023.  She had a cough of productive of clear to white sputum over the last 2 weeks and then she started develop some chest pressure episodes that happen briefly and just for a few seconds and then go away, it was thought to to be musculoskeletal in origin.  She had high sensitive bones that were checked at the time of presentation, and they were 6, and then undetectably low.  Low risk for future cardiac events.    Since her last visit, she has done well.  She has not noted any further the chest pain episodes.  She did have mild dyspnea symptoms at the last evaluation, and she was instructed to increase her Imdur to 60 mg daily, and since that time she has done very well.  She actually has not been noticed any exertional dyspnea and is able to do pickleball without any complaints.    Cardiac history:    CAD status post CABG 1996    She underwent a stress PET at [place] on October 19, 2020.  It showed a medium sized area of moderate to severe anterolateral ischemia with normal LV systolic function.  She subsequently underwent a cardiac catheterization on October 29, 2020.    Patent saphenous graft to LAD and OM, occluded saphenous graft to diagonal  Mild nonobstructive disease of RCA   of LAD  Normal LV systolic function  The abnormal stress test was thought secondary to the occluded diagonal     The following  "portions of the patient's history were reviewed and updated as appropriate: allergies, current medications, past family history, past medical history, past social history, past surgical history and problem list.    Past Medical History:   Diagnosis Date    Abnormal liver function test     Allergic     Arthritis     Asthma     CAD (coronary artery disease)     , s/p CABG, \"-\"PET scan 2011, reversible ischemia on Lexiscan PET perfusion study  Dr.Janet Ocampo    Cancer Skin    Cataract     Cholelithiasis      CT    Chronic UTI     Colon polyp     GERD (gastroesophageal reflux disease)     Heart disease     Herpes simplex type 1 infection     orolabial    Herpes zoster     2010    Hx of bone scan      11: sp -1.8, hip -0.5, fem neck -0.8 14: sp -1.7, hip -0.6, fem neck -1.0    Hyperlipidemia     Hypertension     Incidental lung nodule, less than or equal to 3mm     CTPET 2016 [place], no f/u needed    Insomnia     Internal hemorrhoids     Myocardial infarction     Obstructive sleep apnea     mild     Osteopenia     Dexa 11 sp -1.8 hip -0.5 fem neck -0.8    Papanicolaou smear     2011    Pregnancy         Rotator cuff tendinitis     2004    Tubular adenoma of colon     , , cecal polyp, 2012, , adenoma with mild dysplasia 4 mm ascending colon       Past Surgical History:   Procedure Laterality Date    BLADDER SUSPENSION      BREAST IMPLANT SURGERY      BREAST SURGERY      Implants    CARDIAC CATHETERIZATION   &     CARDIAC SURGERY      COLONOSCOPY        - ascending colon adenoma with mild dysplasia.     COLONOSCOPY  2015    Dr Coyle - patient is to follow up in 2020    CORONARY ARTERY BYPASS GRAFT      COSMETIC SURGERY      EYE SURGERY      Cataract    LAPAROSCOPIC TUBAL LIGATION      ROTATOR CUFF REPAIR Left     TUBAL ABDOMINAL LIGATION  1990         Procedures     " "  Objective:     Vitals:    10/18/23 0927   BP: 110/60   BP Location: Left arm   Pulse: 66   SpO2: 97%   Weight: 70.6 kg (155 lb 11.2 oz)   Height: 162.6 cm (64\")         Constitutional:       Appearance: Healthy appearance. Not in distress.   Neck:      Vascular: JVD normal.   Pulmonary:      Effort: Pulmonary effort is normal.      Breath sounds: Normal breath sounds.   Cardiovascular:      PMI at left midclavicular line. Normal rate. Regular rhythm. Normal S2.       Murmurs: There is no murmur.   Pulses:     Intact distal pulses.   Edema:     Peripheral edema absent.   Skin:     General: Skin is warm and dry.   Neurological:      General: No focal deficit present.      Mental Status: Alert, oriented to person, place, and time and oriented to person, place and time.   Psychiatric:         Mood and Affect: Mood and affect normal.         Lab Review:     Lipid Panel          4/3/2023    10:32 10/5/2023    00:00   Lipid Panel   Total Cholesterol 159  143    Triglycerides 74  77    HDL Cholesterol 64  74    VLDL Cholesterol 14  15    LDL Cholesterol  81  54      BUN   Date Value Ref Range Status   10/05/2023 8 8 - 23 mg/dL Final   03/26/2023 14 8 - 23 mg/dL Final     Creatinine   Date Value Ref Range Status   10/05/2023 0.84 0.57 - 1.00 mg/dL Final   03/26/2023 0.75 0.57 - 1.00 mg/dL Final     Potassium   Date Value Ref Range Status   10/05/2023 4.3 3.5 - 5.2 mmol/L Final   03/26/2023 4.0 3.5 - 5.2 mmol/L Final     ALT (SGPT)   Date Value Ref Range Status   10/05/2023 21 1 - 33 U/L Final   03/26/2023 16 1 - 33 U/L Final     AST (SGOT)   Date Value Ref Range Status   10/05/2023 29 1 - 32 U/L Final   03/26/2023 20 1 - 32 U/L Final     Performed  Holter monitor April 11, 2023:  Chest pain was reported during the monitoring period. Chest pain had no correlations. The predominant rhythm noted during the testing period was sinus rhythm. Premature atrial contractions occured rarely. Premature ventricular contractions occured " rarely. There was one IVR lasting 8 beats. Sinoatrial node conduction was normal. No atrioventricular block noted.         Assessment:          Diagnosis Plan   1. Insomnia, unspecified type        2. Coronary artery disease involving autologous artery coronary bypass graft without angina pectoris  isosorbide mononitrate (IMDUR) 60 MG 24 hr tablet      3. Benign essential hypertension        4. Hypersomnolence  Home Sleep Study      5. STACIA (obstructive sleep apnea)  Home Sleep Study               Plan:         CAD status post CABG: Cardiac anatomy as depicted above  Dyspnea on exertion, her anginal equivalent  Last cath at [place] in 2020, showed an occluded SVG to diagonal, resulting in a positive stress test, medical therapy was advised.  She has no further chest pain episodes.  Her dyspnea is much improved.    With her increased Imdur to 60, her dyspnea has markedly improved.  Continue antianginal therapy.  Target LDL less than 55 given her moderately elevated risk.  LDL 54 on Crestor 20.  Continue  Echo was obtained showing grade 1 diastolic dysfunction but otherwise unremarkable.  EF normal.   Continue beta-blocker and ACE  Aspirin 81 for life  Hyperlipidemia: Controlled per above  Hypertension: Very well controlled today, continue current regimen.  PVCs noted on telemetry while in the ED, on EKG. Holter did not show an elevated burden.  Benign overall.  Metoprolol per above.  Hypersomnolence with nocturnal snoring.  She reports that many years ago she had mild STACIA but was unable to tolerate a CPAP.  She does state that she snores and has some daytime sleepiness occasionally.  We will proceed with home sleep test.  Could be attributing to her diastolic dysfunction     RTC 1 year.  Arranging for home sleep testing in the interim.  Continue antianginal therapy per above.     Gabriel Bee MD  [facility]  10/18/23  10:31 EDT       Current Outpatient Medications:     aspirin 81 MG EC tablet, Take   by mouth., Disp: , Rfl:     budesonide-formoterol (Symbicort) 160-4.5 MCG/ACT inhaler, Inhale 2 puffs 2 (Two) Times a Day., Disp: 3 each, Rfl: 3    Calcium Carb-Cholecalciferol (Calcium 1000 + D) 1000-800 MG-UNIT tablet, Daily., Disp: , Rfl:     Cetirizine HCl 10 MG capsule, Take  by mouth., Disp: , Rfl:     Cholecalciferol (VITAMIN D3) 5000 UNITS capsule capsule, Take 1,000 Units by mouth daily., Disp: , Rfl:     escitalopram (Lexapro) 20 MG tablet, Take 1 tablet by mouth Daily., Disp: 90 tablet, Rfl: 1    estradiol (ESTRACE) 0.1 MG/GM vaginal cream, Insert 2 g into the vagina 3 (Three) Times a Week., Disp: 42.5 g, Rfl: 3    fluticasone (FLONASE) 50 MCG/ACT nasal spray, , Disp: , Rfl:     isosorbide mononitrate (IMDUR) 60 MG 24 hr tablet, Take 1 tablet by mouth Daily., Disp: 90 tablet, Rfl: 3    lisinopril (PRINIVIL,ZESTRIL) 2.5 MG tablet, TAKE 1 TABLET EVERY DAY, Disp: 90 tablet, Rfl: 3    metoprolol tartrate (LOPRESSOR) 25 MG tablet, TAKE 1 TABLET TWICE DAILY, Disp: 180 tablet, Rfl: 3    Multiple Vitamin (MULTI VITAMIN DAILY PO), Take  by mouth., Disp: , Rfl:     Omega-3 Fatty Acids (FISH OIL) 1200 MG capsule capsule, Take  by mouth., Disp: , Rfl:     pantoprazole (Protonix) 40 MG EC tablet, Take 1 tablet by mouth Daily., Disp: 90 tablet, Rfl: 3    PROAIR  (90 Base) MCG/ACT inhaler, , Disp: , Rfl:     rosuvastatin (CRESTOR) 20 MG tablet, Take 1 tablet by mouth Every Night., Disp: 90 tablet, Rfl: 3    traZODone (DESYREL) 100 MG tablet, TAKE 1 TABLET BY MOUTH EVERY NIGHT., Disp: 90 tablet, Rfl: 3    VALACYCLOVIR HCL PO, As Needed., Disp: , Rfl:          Return in about 1 year (around 10/18/2024).      Part of this note may be an electronic transcription/translation of spoken language to printed text using the Dragon Dictation System.

## 2023-10-19 ENCOUNTER — OFFICE VISIT (OUTPATIENT)
Dept: INTERNAL MEDICINE | Facility: CLINIC | Age: 73
End: 2023-10-19
Payer: MEDICARE

## 2023-10-19 VITALS
HEART RATE: 78 BPM | WEIGHT: 154.6 LBS | BODY MASS INDEX: 26.4 KG/M2 | TEMPERATURE: 98 F | DIASTOLIC BLOOD PRESSURE: 64 MMHG | HEIGHT: 64 IN | SYSTOLIC BLOOD PRESSURE: 108 MMHG | OXYGEN SATURATION: 97 %

## 2023-10-19 DIAGNOSIS — Z00.00 MEDICARE ANNUAL WELLNESS VISIT, SUBSEQUENT: Primary | ICD-10-CM

## 2023-10-19 DIAGNOSIS — Z12.31 ENCOUNTER FOR SCREENING MAMMOGRAM FOR MALIGNANT NEOPLASM OF BREAST: ICD-10-CM

## 2023-10-19 PROCEDURE — 3074F SYST BP LT 130 MM HG: CPT | Performed by: FAMILY MEDICINE

## 2023-10-19 PROCEDURE — 3078F DIAST BP <80 MM HG: CPT | Performed by: FAMILY MEDICINE

## 2023-10-19 PROCEDURE — G0439 PPPS, SUBSEQ VISIT: HCPCS | Performed by: FAMILY MEDICINE

## 2023-10-19 PROCEDURE — 1170F FXNL STATUS ASSESSED: CPT | Performed by: FAMILY MEDICINE

## 2023-10-19 PROCEDURE — 1160F RVW MEDS BY RX/DR IN RCRD: CPT | Performed by: FAMILY MEDICINE

## 2023-10-19 PROCEDURE — 1159F MED LIST DOCD IN RCRD: CPT | Performed by: FAMILY MEDICINE

## 2023-10-19 NOTE — PROGRESS NOTES
The ABCs of the Annual Wellness Visit  Subsequent Medicare Wellness Visit    Subjective      Colleen Lovell is a 73 y.o. female who presents for a Subsequent Medicare Wellness Visit.    The following portions of the patient's history were reviewed and   updated as appropriate: allergies, current medications, past family history, past medical history, past social history, past surgical history, and problem list.    Compared to one year ago, the patient feels her physical   health is better.    Compared to one year ago, the patient feels her mental   health is better.    Recent Hospitalizations:  She was not admitted to the hospital during the last year.       Current Medical Providers:  Patient Care Team:  Tracee Curran MD as PCP - General (Family Medicine)    Outpatient Medications Prior to Visit   Medication Sig Dispense Refill    aspirin 81 MG EC tablet Take  by mouth.      budesonide-formoterol (Symbicort) 160-4.5 MCG/ACT inhaler Inhale 2 puffs 2 (Two) Times a Day. 3 each 3    Calcium Carb-Cholecalciferol (Calcium 1000 + D) 1000-800 MG-UNIT tablet Daily.      Cetirizine HCl 10 MG capsule Take  by mouth.      Cholecalciferol (VITAMIN D3) 5000 UNITS capsule capsule Take 1,000 Units by mouth daily.      escitalopram (Lexapro) 20 MG tablet Take 1 tablet by mouth Daily. 90 tablet 1    estradiol (ESTRACE) 0.1 MG/GM vaginal cream Insert 2 g into the vagina 3 (Three) Times a Week. 42.5 g 3    fluticasone (FLONASE) 50 MCG/ACT nasal spray       isosorbide mononitrate (IMDUR) 60 MG 24 hr tablet Take 1 tablet by mouth Daily. 90 tablet 3    lisinopril (PRINIVIL,ZESTRIL) 2.5 MG tablet TAKE 1 TABLET EVERY DAY 90 tablet 3    metoprolol tartrate (LOPRESSOR) 25 MG tablet TAKE 1 TABLET TWICE DAILY 180 tablet 3    Multiple Vitamin (MULTI VITAMIN DAILY PO) Take  by mouth.      Omega-3 Fatty Acids (FISH OIL) 1200 MG capsule capsule Take  by mouth.      pantoprazole (Protonix) 40 MG EC tablet Take 1 tablet by mouth Daily. 90  "tablet 3    PROAIR  (90 Base) MCG/ACT inhaler       rosuvastatin (CRESTOR) 20 MG tablet Take 1 tablet by mouth Every Night. 90 tablet 3    traZODone (DESYREL) 100 MG tablet TAKE 1 TABLET BY MOUTH EVERY NIGHT. 90 tablet 3    VALACYCLOVIR HCL PO As Needed.       No facility-administered medications prior to visit.       No opioid medication identified on active medication list. I have reviewed chart for other potential  high risk medication/s and harmful drug interactions in the elderly.        Aspirin is on active medication list. Aspirin use is indicated based on review of current medical condition/s. Pros and cons of this therapy have been discussed today. Benefits of this medication outweigh potential harm.  Patient has been encouraged to continue taking this medication.  .      Patient Active Problem List   Diagnosis    Atopic rhinitis    Herpes simplex type 1 infection    Impaired fasting glucose    Insomnia    Menopausal symptom    Age-related osteoporosis without current pathological fracture    Laryngopharyngeal reflux    Urinary tract infection, recurrent    Routine health maintenance    CAD (coronary artery disease)    Benign essential hypertension    Diastolic dysfunction    History of adenomatous polyp of colon    Nocturia    Prediabetes    Moderate persistent asthma without complication    Herpes labialis    Reactive depression     Advance Care Planning   Advance Care Planning     Advance Directive is on file.  ACP discussion was held with the patient during this visit. Patient has an advance directive in EMR which is still valid.      Objective    Vitals:    10/19/23 1100   BP: 108/64   BP Location: Left arm   Patient Position: Sitting   Cuff Size: Adult   Pulse: 78   Temp: 98 °F (36.7 °C)   TempSrc: Infrared   SpO2: 97%   Weight: 70.1 kg (154 lb 9.6 oz)   Height: 162.6 cm (64\")   PainSc: 0-No pain     Estimated body mass index is 26.54 kg/m² as calculated from the following:    Height as of this " "encounter: 162.6 cm (64\").    Weight as of this encounter: 70.1 kg (154 lb 9.6 oz).    BMI is >= 25 and <30. (Overweight) The following options were offered after discussion;: exercise counseling/recommendations and nutrition counseling/recommendations      Does the patient have evidence of cognitive impairment?   No    Lab Results   Component Value Date    CHLPL 143 10/05/2023    TRIG 77 10/05/2023    HDL 74 (H) 10/05/2023    LDL 54 10/05/2023    VLDL 15 10/05/2023    HGBA1C 6.00 (H) 10/05/2023          HEALTH RISK ASSESSMENT    Smoking Status:  Social History     Tobacco Use   Smoking Status Never   Smokeless Tobacco Never     Alcohol Consumption:  Social History     Substance and Sexual Activity   Alcohol Use Never     Fall Risk Screen:    KELBER Fall Risk Assessment was completed, and patient is at LOW risk for falls.Assessment completed on:10/19/2023    Depression Screening:      10/19/2023    11:03 AM   PHQ-2/PHQ-9 Depression Screening   Little Interest or Pleasure in Doing Things 0-->not at all   Feeling Down, Depressed or Hopeless 0-->not at all   PHQ-9: Brief Depression Severity Measure Score 0       Health Habits and Functional and Cognitive Screening:      10/19/2023    11:03 AM   Functional & Cognitive Status   Do you have difficulty preparing food and eating? No   Do you have difficulty bathing yourself, getting dressed or grooming yourself? No   Do you have difficulty using the toilet? No   Do you have difficulty moving around from place to place? No   Do you have trouble with steps or getting out of a bed or a chair? No   Current Diet Unhealthy Diet   Dental Exam Up to date   Eye Exam Up to date   Exercise (times per week) 7 times per week   Current Exercises Include Pickleball;Walking        Exercise Comment plays Pickleball 3 days a week   Do you need help using the phone?  No   Are you deaf or do you have serious difficulty hearing?  No   Do you need help to go to places out of walking distance? No "   Do you need help shopping? No   Do you need help preparing meals?  No   Do you need help with housework?  No   Do you need help with laundry? No   Do you need help taking your medications? No   Do you need help managing money? No   Do you ever drive or ride in a car without wearing a seat belt? No   Have you felt unusual stress, anger or loneliness in the last month? No   Who do you live with? Spouse   If you need help, do you have trouble finding someone available to you? No   Have you been bothered in the last four weeks by sexual problems? No   Do you have difficulty concentrating, remembering or making decisions? No       Age-appropriate Screening Schedule:  Refer to the list below for future screening recommendations based on patient's age, sex and/or medical conditions. Orders for these recommended tests are listed in the plan section. The patient has been provided with a written plan.    Health Maintenance   Topic Date Due    BMI FOLLOWUP  Never done    PAP SMEAR  02/06/2020    MAMMOGRAM  11/18/2023    LIPID PANEL  10/05/2024    ANNUAL WELLNESS VISIT  10/19/2024    DXA SCAN  04/21/2025    COLORECTAL CANCER SCREENING  06/22/2030    HEPATITIS C SCREENING  Completed    COVID-19 Vaccine  Completed    Pneumococcal Vaccine 65+  Completed    ZOSTER VACCINE  Addressed    INFLUENZA VACCINE  Discontinued    TDAP/TD VACCINES  Discontinued                  CMS Preventative Services Quick Reference  Risk Factors Identified During Encounter:    Immunizations Discussed/Encouraged: Influenza and COVID19 done at pharmacy.    The above risks/problems have been discussed with the patient.  Pertinent information has been shared with the patient in the After Visit Summary.    Diagnoses and all orders for this visit:    1. Medicare annual wellness visit, subsequent (Primary)    2. Encounter for screening mammogram for malignant neoplasm of breast  -     Mammo screening digital tomosynthesis bilateral w CAD; Future      1. CAD.   On ASA, isosorbide, lisinopril, metoprolol, rosuvastatin.  Per Dr. Bee.     2. MAILE. Controlled with cetirizine and fluticasone nasal spray.      3. GERD.  Controlled with pantoprazole and tums.     4. Prediabetes.  A1c 6.0, up from 5.8.  Continue lifestyle measures.     5. Osteoporosis.  Didn't tolerate alendronate.  Didn't tolerate Prolia due to muscle cramps.  Dexa scan done 4/2023 showed improvement to osteopenia.  Calcium, vitamin D, weightbearing exercise.  Recheck Dexa 4/2025.     6. Recurrent UTI.  She is doing well with vaginal estrogen per Dr. Turicos; I took over this medication for her.  She took daily cephalexin for 3 months.     7. Insomnia.  Continue trazodone.  Doing well with this and lifestyle measures.     8. History of colon polyp.  Had colonoscopy June 2020 by Dr. Coyle.  Next due in 5 years, 6/2025.     9. Asthma, moderate persistent.  Controlled with Symbicort.     10. Herpes labialis.  Prn valacyclovir.     11. Routine health maint.  Pneumococcal vaccines UTD.  Shingrix done at pharmacy.  Mammogram due next month and is ordered.  Covid-19 vaccine done.  Flu vaccine UTD.    12. Reactive depression.  Doing well with escitalopram.    13. Snoring.  Dr. Bee ordered a home sleep study.    Follow Up:   Next Medicare Wellness visit to be scheduled in 1 year.      An After Visit Summary and PPPS were made available to the patient.

## 2023-10-24 ENCOUNTER — TRANSCRIBE ORDERS (OUTPATIENT)
Dept: ADMINISTRATIVE | Facility: HOSPITAL | Age: 73
End: 2023-10-24
Payer: MEDICARE

## 2023-11-02 ENCOUNTER — TELEPHONE (OUTPATIENT)
Dept: CARDIOLOGY | Facility: CLINIC | Age: 73
End: 2023-11-02
Payer: MEDICARE

## 2023-11-02 NOTE — TELEPHONE ENCOUNTER
[phone]    Pt called and lm that she had an appt with EK on 10/18/23 and that he ordered a sleep study.    Patient has yet to hear from scheduling.  Please advise      [facility]

## 2023-11-11 DIAGNOSIS — F51.01 PRIMARY INSOMNIA: ICD-10-CM

## 2023-11-11 DIAGNOSIS — I25.810 CORONARY ARTERY DISEASE INVOLVING AUTOLOGOUS ARTERY CORONARY BYPASS GRAFT WITHOUT ANGINA PECTORIS: ICD-10-CM

## 2023-11-13 RX ORDER — TRAZODONE HYDROCHLORIDE 100 MG/1
100 TABLET ORAL NIGHTLY
Qty: 90 TABLET | Refills: 10 | Status: SHIPPED | OUTPATIENT
Start: 2023-11-13

## 2023-11-20 ENCOUNTER — HOSPITAL ENCOUNTER (OUTPATIENT)
Dept: MAMMOGRAPHY | Facility: HOSPITAL | Age: 73
Discharge: HOME | End: 2023-11-20
Admitting: FAMILY MEDICINE
Payer: MEDICARE

## 2023-11-20 DIAGNOSIS — Z12.31 ENCOUNTER FOR SCREENING MAMMOGRAM FOR MALIGNANT NEOPLASM OF BREAST: ICD-10-CM

## 2023-11-20 PROCEDURE — 77063 BREAST TOMOSYNTHESIS BI: CPT

## 2023-11-20 PROCEDURE — 77067 SCR MAMMO BI INCL CAD: CPT

## 2023-11-27 ENCOUNTER — HOSPITAL ENCOUNTER (OUTPATIENT)
Dept: SLEEP MEDICINE | Facility: HOSPITAL | Age: 73
End: 2023-11-27
Payer: MEDICARE

## 2023-11-27 DIAGNOSIS — G47.33 OSA (OBSTRUCTIVE SLEEP APNEA): ICD-10-CM

## 2023-11-27 DIAGNOSIS — G47.10 HYPERSOMNOLENCE: ICD-10-CM

## 2023-11-27 PROCEDURE — 95806 SLEEP STUDY UNATT&RESP EFFT: CPT

## 2023-12-06 DIAGNOSIS — G47.10 HYPERSOMNOLENCE: ICD-10-CM

## 2023-12-06 DIAGNOSIS — G47.33 OSA (OBSTRUCTIVE SLEEP APNEA): Primary | ICD-10-CM

## 2023-12-07 ENCOUNTER — TELEPHONE (OUTPATIENT)
Dept: SLEEP MEDICINE | Facility: HOSPITAL | Age: 73
End: 2023-12-07
Payer: MEDICARE

## 2023-12-07 ENCOUNTER — TELEPHONE (OUTPATIENT)
Dept: CARDIOLOGY | Facility: CLINIC | Age: 73
End: 2023-12-07
Payer: MEDICARE

## 2023-12-07 NOTE — PROGRESS NOTES
Can you please call patient let her know that her home sleep test did suggest mild sleep apnea.  It sounds like the sleep clinic will be in touch with her regarding the next steps.  I did note that she struggled with the CPAP in the past, we will have to see how she does now, she should be in touch with the sleep clinic to discuss any additional concerns or treatment modalities that might be needed for this.  Thank you for the help

## 2023-12-07 NOTE — TELEPHONE ENCOUNTER
----- Message from Gabriel Bee MD sent at 12/6/2023  9:42 PM EST -----  Can you please call patient let her know that her home sleep test did suggest mild sleep apnea.  It sounds like the sleep clinic will be in touch with her regarding the next steps.  I did note that she struggled with the CPAP in the past, we will have to see how she does now, she should be in touch with the sleep clinic to discuss any additional concerns or treatment modalities that might be needed for this.  Thank you for the help

## 2024-01-10 DIAGNOSIS — F32.9 REACTIVE DEPRESSION: ICD-10-CM

## 2024-01-10 RX ORDER — ESCITALOPRAM OXALATE 20 MG/1
20 TABLET ORAL DAILY
Qty: 90 TABLET | Refills: 3 | Status: SHIPPED | OUTPATIENT
Start: 2024-01-10

## 2024-01-10 NOTE — TELEPHONE ENCOUNTER
Rx Refill Note  Requested Prescriptions     Pending Prescriptions Disp Refills    escitalopram (LEXAPRO) 20 MG tablet [Pharmacy Med Name: ESCITALOPRAM OXALATE 20 MG Tablet] 90 tablet 3     Sig: TAKE 1 TABLET EVERY DAY      Last office visit with prescribing clinician: 10/19/2023   Last telemedicine visit with prescribing clinician: Visit date not found   Next office visit with prescribing clinician: 4/22/2024                         Would you like a call back once the refill request has been completed: [] Yes [] No    If the office needs to give you a call back, can they leave a voicemail: [] Yes [] No    Joana Gaytan CMA  01/10/24, 11:52 EST

## 2024-01-30 DIAGNOSIS — R06.09 DYSPNEA ON EXERTION: ICD-10-CM

## 2024-01-30 DIAGNOSIS — I25.810 CORONARY ARTERY DISEASE INVOLVING AUTOLOGOUS ARTERY CORONARY BYPASS GRAFT WITHOUT ANGINA PECTORIS: ICD-10-CM

## 2024-01-31 RX ORDER — ROSUVASTATIN CALCIUM 20 MG/1
20 TABLET, COATED ORAL NIGHTLY
Qty: 90 TABLET | Refills: 3 | Status: SHIPPED | OUTPATIENT
Start: 2024-01-31 | End: 2025-01-30

## 2024-04-05 RX ORDER — PANTOPRAZOLE SODIUM 40 MG/1
40 TABLET, DELAYED RELEASE ORAL DAILY
Qty: 90 TABLET | Refills: 3 | Status: SHIPPED | OUTPATIENT
Start: 2024-04-05

## 2024-04-05 RX ORDER — BUDESONIDE AND FORMOTEROL FUMARATE DIHYDRATE 160; 4.5 UG/1; UG/1
2 AEROSOL RESPIRATORY (INHALATION) 2 TIMES DAILY
Qty: 3 EACH | Refills: 3 | Status: SHIPPED | OUTPATIENT
Start: 2024-04-05

## 2024-04-11 ENCOUNTER — TELEPHONE (OUTPATIENT)
Dept: SLEEP MEDICINE | Facility: HOSPITAL | Age: 74
End: 2024-04-11
Payer: MEDICARE

## 2024-04-11 ENCOUNTER — OFFICE VISIT (OUTPATIENT)
Dept: SLEEP MEDICINE | Facility: HOSPITAL | Age: 74
End: 2024-04-11
Payer: MEDICARE

## 2024-04-11 VITALS
HEIGHT: 64 IN | BODY MASS INDEX: 24.75 KG/M2 | SYSTOLIC BLOOD PRESSURE: 115 MMHG | OXYGEN SATURATION: 95 % | HEART RATE: 64 BPM | DIASTOLIC BLOOD PRESSURE: 66 MMHG | WEIGHT: 145 LBS

## 2024-04-11 DIAGNOSIS — G47.33 OBSTRUCTIVE SLEEP APNEA, ADULT: Primary | ICD-10-CM

## 2024-04-11 DIAGNOSIS — I10 ESSENTIAL HYPERTENSION: ICD-10-CM

## 2024-04-11 PROCEDURE — G0463 HOSPITAL OUTPT CLINIC VISIT: HCPCS

## 2024-04-11 NOTE — PROGRESS NOTES
[facility]  [address]  [address]  [place] [address]  Phone   Fax     SLEEP CLINIC FOLLOW UP PROGRESS NOTE.    Colleen Lovell  [id number]   1950  73 y.o.  female      PCP: Tracee Curran MD      Date of visit: 4/11/2024    Chief Complaint   Patient presents with    Sleep Apnea     Establish care       Medications and allergies are reviewed by me and documented in the encounter.     SOCIAL (habits pertaining to sleep medicine)  History tobacco use:No   History of alcohol use: 0 per week  Caffeine use: 1 beverages/d    HPI:  This is a 73 y.o. PMHx HTN, depression, CAD (status post CABG in 1996).  Here to establish care for obstructive sleep apnea (SUMANTH 5.2/h on 11/27/2023 HST ordered by her cardiologist).  . Patient is using positive airway pressure therapy and the symptoms of sleep apnea have improved significantly on the therapy. Normally patient goes to bed at 10 PM and wakes up at 7 AM .  The patient wakes up 1x time(s) during the night and has no problem going back to sleep.  Feels refreshed after waking up. Denies UPP/T&A/Nasal surgery.    NEW PATIENT TO [facility]    Overall patient's Impression of their PAP therapy is: Going well  Air pressures can feel too little      Compliance data as reviewed by me with patient room today:  Date range 12/13/2023 - 4/9/2024  Overall use 95%  4-hour yuki 92%  Average days used 6 hours 55 minutes  Device AirSense 10 AutoSet  Settings 8-20 cm H2O, EPR off  95th percentile pressure is 12.8 cm H2O  95th percentile leak is 12 LPM  AHI 0.6  DME: [employer]  Mask: FFM - airtouch f20 full face mask     -Normal BMI  Wt Readings from Last 3 Encounters:   04/11/24 65.8 kg (145 lb)   10/19/23 70.1 kg (154 lb 9.6 oz)   10/18/23 70.6 kg (155 lb 11.2 oz)     Body mass index is 24.89 kg/m².    -CABG in 1996 no other metal in head/neck/chest   No PPM  No AICD    Asthma well controlled   no other known  "pulmonary conditions  Denies past known neurologic disorders/neuromuscular disorder    REVIEW OF SYSTEMS:   Is negative unless otherwise noted in HPI  Aubrey Sleepiness Scale :Total score: 6     Disclaimer History: The above history is based on this sleep physician's in room encounter with the patient. Pre encounter self administered questionnaires are taken into consideration and discussed with patient for any discordance. The above documentation by this sleep physician is the most accurate clinical information determined by in room sleep physician encounter with patient.     PHYSICAL EXAMINATION:  Vitals:    04/11/24 0900   BP: 115/66   Pulse: 64   SpO2: 95%   Weight: 65.8 kg (145 lb)   Height: 162.6 cm (64\")    Body mass index is 24.89 kg/m².   CONSTITUTIONAL: Well appearing, in no overt pain or respiratory distress   ENT: Mallampati I, Macroglossia WITH tongue ridging  RESP SYSTEM: Breath sounds clear bilateral,  No overt respiratory distress, speaks in clear sentences without dyspnea, no accessory muscle use  CARDIOVASULAR: RRR (no rub, no gallop), No edema noted  NEURO: Oriented x 3, no gross focal deficits   Psych: Affect appropriate full range    Compliance data as reviewed by me with patient room today:  Date range 12/13/2023 - 4/9/2024  Overall use 95%  4-hour yuki 92%  Average days used 6 hours 55 minutes  Device AirSense 10 AutoSet  Settings 8-20 cm H2O, EPR off  95th percentile pressure is 12.8 cm H2O  95th percentile leak is 12 LPM  AHI 0.6  DME: [employer]  Mask: FFM - airtouch f20 full face mask  - no leak symptoms    ASSESSMENT AND PLAN:  Obstructive sleep apnea ( G 47.33).    -Specific Changes made by me today:  I. After review of compliance data, in visit clinical correlation, and through shared decision making:    Raise min pressure to 9, add EPR 2, add ramp as below  AutoCPAP 9-20, EPR 2, Ramp 9 cm H2O FOR 20 minutes   II.  Through shared decision making we will follow up at 6 months or therapy " review - patient preferred interval for close follow up.  Counseled may request sooner follow-up to sleep clinic anytime the patient feels necessary.  The symptoms of sleep apnea have improved with the device and the treatment.  Patient's compliance with the device is excellent for treatment of sleep apnea.  I have independently reviewed the smart card down load and discussed with the patient the download data and encouarged the patient to continue to use the device.The residual AHI is acceptable. The device is benefiting the patient and the device is medically necessary.  Without proper control of sleep apnea and good compliance there is a increased risk for hypertension, diabetes mellitus and nonrestorative sleep with hypersomnia which can increase risk for motor vehicle accidents.  Untreated sleep apnea is also a risk factor for development of atrial fibrillation, pulmonary hypertension, insulin resistance and stroke. The patient is also instructed to get the supplies from the [employer] and and change them on a regular basis.  A prescription for supplies has been sent to the [employer].  I have also discussed the good sleep hygiene habits and adequate amount of sleep needed for good health.  Normal BMI with BMI is Body mass index is 24.89 kg/m².. Weight loss not indicated. Health lifestyle modification encouraged. Follow up with PCP to review preventative care.  Essential hypertension [I10], Compliant wit home therapies reviewed.  Counseled patient PAP therapy compliance for treatment of obstructive sleep apnea may be beneficial for this comorbid condition.  Follow-up with PCP as previous for hypertension       Follow up in 6 months . Patient's questions were answered.        EMR Dragon/Transcription disclaimer:   Much of this encounter note is an electronic transcription/translation of spoken language to printed text. The electronic translation of spoken language may permit erroneous, or at times, nonsensical  words or phrases to be inadvertently transcribed; Although I have reviewed the note for such errors, some may still exist.       NPI #: [id number]    Cedric Gomes, [facility] Medicine  [facility]  04/11/24

## 2024-04-11 NOTE — TELEPHONE ENCOUNTER
Received pressure change from Dr. Gomes,   Made changes in [employer]     04/11/2024, 07:38 AM    by Ivan Cordova    Settings sent to device    Set Mode to AutoSet  Set Min Pressure to 9.0 cmH2O  Set Max Pressure to 20.0 cmH2O  Set Response to Soft  Set EPR to Fulltime  Set EPR level to 2  Set Ramp enable to On  Set Ramp time to 20 min  Set Start pressure to 9.0 cmH2O  Previous Settings    Set Mode to AutoSet  Set Min Pressure to 8.0 cmH2O  Set Max Pressure to 20.0 cmH2O  Set Response to Soft  Set EPR to Off  Set EPR level to 1  Set Ramp enable to Off

## 2024-04-17 ENCOUNTER — TELEPHONE (OUTPATIENT)
Dept: INTERNAL MEDICINE | Facility: CLINIC | Age: 74
End: 2024-04-17
Payer: MEDICARE

## 2024-04-17 DIAGNOSIS — I25.810 CORONARY ARTERY DISEASE INVOLVING AUTOLOGOUS ARTERY CORONARY BYPASS GRAFT WITHOUT ANGINA PECTORIS: ICD-10-CM

## 2024-04-17 DIAGNOSIS — Z86.010 HISTORY OF ADENOMATOUS POLYP OF COLON: ICD-10-CM

## 2024-04-17 DIAGNOSIS — R73.03 PREDIABETES: Primary | ICD-10-CM

## 2024-04-17 DIAGNOSIS — E55.9 HYPOVITAMINOSIS D: ICD-10-CM

## 2024-04-17 DIAGNOSIS — I10 BENIGN ESSENTIAL HYPERTENSION: ICD-10-CM

## 2024-04-23 ENCOUNTER — DOCUMENTATION (OUTPATIENT)
Dept: SLEEP MEDICINE | Facility: CLINIC | Age: 74
End: 2024-04-23
Payer: MEDICARE

## 2024-04-23 ENCOUNTER — TELEPHONE (OUTPATIENT)
Dept: SLEEP MEDICINE | Facility: HOSPITAL | Age: 74
End: 2024-04-23
Payer: MEDICARE

## 2024-04-23 NOTE — TELEPHONE ENCOUNTER
Spoke with patient and she advised that the pressure is forcefully. Advised the pressures was not like this before.  Download has been placed in [employer] for Dr. Gomes to review.

## 2024-04-23 NOTE — PROGRESS NOTES
4/23/2024    Informed by sleep staff today patient called and states pressures too much on PAP.    4/11/2024 Clinic notes reviewed      Plan:    One time pressure changes instructed to sleep staff as follows:    -AutoCPAP 8-20 cm H2O, Ramp at 8 cm H2O for 30 minutes, EPR 3  -If patient continues to encounter PAP therapy concerns can offer sooner office visit for clinical correlation no further setting changes without being seen      NPI #: [id number]    Cedric Gomes, DO  Sleep Medicine  [facility]  04/23/24

## 2024-04-29 ENCOUNTER — OFFICE VISIT (OUTPATIENT)
Dept: INTERNAL MEDICINE | Facility: CLINIC | Age: 74
End: 2024-04-29
Payer: MEDICARE

## 2024-04-29 ENCOUNTER — DOCUMENTATION (OUTPATIENT)
Dept: SLEEP MEDICINE | Facility: HOSPITAL | Age: 74
End: 2024-04-29
Payer: MEDICARE

## 2024-04-29 VITALS
OXYGEN SATURATION: 97 % | SYSTOLIC BLOOD PRESSURE: 128 MMHG | DIASTOLIC BLOOD PRESSURE: 68 MMHG | HEIGHT: 64 IN | BODY MASS INDEX: 25.44 KG/M2 | WEIGHT: 149 LBS | TEMPERATURE: 97.8 F | HEART RATE: 57 BPM

## 2024-04-29 DIAGNOSIS — R73.03 PREDIABETES: ICD-10-CM

## 2024-04-29 DIAGNOSIS — G47.00 INSOMNIA, UNSPECIFIED TYPE: ICD-10-CM

## 2024-04-29 DIAGNOSIS — N39.0 URINARY TRACT INFECTION, RECURRENT: ICD-10-CM

## 2024-04-29 DIAGNOSIS — B00.1 HERPES LABIALIS: ICD-10-CM

## 2024-04-29 DIAGNOSIS — J30.89 NON-SEASONAL ALLERGIC RHINITIS, UNSPECIFIED TRIGGER: ICD-10-CM

## 2024-04-29 DIAGNOSIS — J45.40 MODERATE PERSISTENT ASTHMA WITHOUT COMPLICATION: ICD-10-CM

## 2024-04-29 DIAGNOSIS — Z00.00 ROUTINE HEALTH MAINTENANCE: ICD-10-CM

## 2024-04-29 DIAGNOSIS — N81.4 CYSTOCELE WITH PROLAPSE: ICD-10-CM

## 2024-04-29 DIAGNOSIS — Z86.010 HISTORY OF ADENOMATOUS POLYP OF COLON: ICD-10-CM

## 2024-04-29 DIAGNOSIS — K21.9 LARYNGOPHARYNGEAL REFLUX: ICD-10-CM

## 2024-04-29 DIAGNOSIS — E55.9 HYPOVITAMINOSIS D: ICD-10-CM

## 2024-04-29 DIAGNOSIS — M81.0 AGE-RELATED OSTEOPOROSIS WITHOUT CURRENT PATHOLOGICAL FRACTURE: ICD-10-CM

## 2024-04-29 DIAGNOSIS — I25.810 CORONARY ARTERY DISEASE INVOLVING AUTOLOGOUS ARTERY CORONARY BYPASS GRAFT WITHOUT ANGINA PECTORIS: Primary | ICD-10-CM

## 2024-04-29 DIAGNOSIS — F32.9 REACTIVE DEPRESSION: ICD-10-CM

## 2024-04-29 PROCEDURE — 99214 OFFICE O/P EST MOD 30 MIN: CPT | Performed by: FAMILY MEDICINE

## 2024-04-29 PROCEDURE — 3074F SYST BP LT 130 MM HG: CPT | Performed by: FAMILY MEDICINE

## 2024-04-29 PROCEDURE — 1159F MED LIST DOCD IN RCRD: CPT | Performed by: FAMILY MEDICINE

## 2024-04-29 PROCEDURE — 1160F RVW MEDS BY RX/DR IN RCRD: CPT | Performed by: FAMILY MEDICINE

## 2024-04-29 PROCEDURE — 3078F DIAST BP <80 MM HG: CPT | Performed by: FAMILY MEDICINE

## 2024-04-29 NOTE — PROGRESS NOTES
Colleen Lovell is a 73 y.o. female, who presents with a chief complaint of   Chief Complaint   Patient presents with    Reactive depression     F/U    Prediabetes       Coronary Artery Disease  Risk factors include hyperlipidemia.   Hypertension    Hyperlipidemia    Insomnia  Associated symptoms include arthralgias.      1.  CAD.  She had CABG X 3 at age 46.  Coronary catheterization 10/2020 showed occlusion of LAD diagonal, with the plan to treat medically. Tolerating rosuvastatin, isosorbide, ASA, beta blocker, ace-inhibitor.  She sees Dr. Rebecca Ocampo.    2. Depression.  Pt reports she is still doing well with escitalopram.    3. Asthma.  Pt tolerating Symbicort and denies symptoms of a flare.    The following portions of the patient's history were reviewed and updated as appropriate: allergies, current medications, past family history, past medical history, past social history, past surgical history and problem list.    Allergies: Patient has no known allergies.    Review of Systems   Constitutional: Negative.    Eyes: Negative.    Respiratory: Negative.     Cardiovascular: Negative.    Gastrointestinal: Negative.    Endocrine: Negative.    Genitourinary:  Positive for frequency.   Musculoskeletal:  Positive for arthralgias.   Skin: Negative.    Allergic/Immunologic: Positive for environmental allergies.   Neurological: Negative.    Hematological: Negative.    Psychiatric/Behavioral:  Positive for sleep disturbance. The patient has insomnia.              Wt Readings from Last 3 Encounters:   04/29/24 67.6 kg (149 lb)   04/11/24 65.8 kg (145 lb)   10/19/23 70.1 kg (154 lb 9.6 oz)     Temp Readings from Last 3 Encounters:   04/29/24 97.8 °F (36.6 °C) (Infrared)   10/19/23 98 °F (36.7 °C) (Infrared)   08/17/23 98 °F (36.7 °C) (Infrared)     BP Readings from Last 3 Encounters:   04/29/24 128/68   04/11/24 115/66   10/19/23 108/64     Pulse Readings from Last 3 Encounters:   04/29/24 57   04/11/24 64    10/19/23 78     Body mass index is 25.58 kg/m².  @LASTSAO2(3)@    Physical Exam  Vitals and nursing note reviewed.   Constitutional:       Appearance: Normal appearance.   HENT:      Head: Normocephalic and atraumatic.      Nose: Nose normal.      Mouth/Throat:      Mouth: Mucous membranes are moist.   Eyes:      Extraocular Movements: Extraocular movements intact.      Conjunctiva/sclera: Conjunctivae normal.   Cardiovascular:      Rate and Rhythm: Normal rate and regular rhythm.      Heart sounds: No murmur heard.  Pulmonary:      Effort: Pulmonary effort is normal.      Breath sounds: Normal breath sounds.   Abdominal:      Palpations: Abdomen is soft.      Tenderness: There is no abdominal tenderness.   Musculoskeletal:      Cervical back: Neck supple. No rigidity.      Right lower leg: No edema.      Left lower leg: No edema.   Skin:     General: Skin is warm and dry.   Neurological:      General: No focal deficit present.      Mental Status: She is alert and oriented to person, place, and time.   Psychiatric:         Mood and Affect: Mood normal.         Behavior: Behavior normal.             Diagnoses and all orders for this visit:    1. Coronary artery disease involving autologous artery coronary bypass graft without angina pectoris (Primary)  -     Lipid Panel With / Chol / HDL Ratio; Future    2. Non-seasonal allergic rhinitis, unspecified trigger    3. Laryngopharyngeal reflux  -     CBC Auto Differential; Future    4. Prediabetes  -     Comprehensive Metabolic Panel; Future  -     Hemoglobin A1c; Future  -     Vitamin B12; Future    5. Age-related osteoporosis without current pathological fracture    6. Urinary tract infection, recurrent    7. Insomnia, unspecified type    8. History of adenomatous polyp of colon    9. Moderate persistent asthma without complication    10. Herpes labialis    11. Reactive depression  -     TSH; Future    12. Routine health maintenance    13. Cystocele with prolapse  -      Ambulatory Referral to Gynecology    14. Hypovitaminosis D  -     Vitamin D,25-Hydroxy; Future      1. CAD.  On ASA, isosorbide, lisinopril, metoprolol, rosuvastatin.  Per Dr. Bee.     2. MAILE. Controlled with cetirizine and fluticasone nasal spray.      3. GERD.  Controlled with pantoprazole and tums.     4. Prediabetes.  A1c 6.0, up from 5.8.  Continue lifestyle measures.     5. Osteoporosis.  Didn't tolerate alendronate.  Didn't tolerate Prolia due to muscle cramps.  Dexa scan done 4/2023 showed improvement to osteopenia.  Calcium, vitamin D, weightbearing exercise.  Recheck Dexa 4/2025.     6. Recurrent UTI.  She is using vaginal estrogen per Dr. Turcios; I took over this medication for her.  She took daily cephalexin for 3 months.  She reports bladder falling and would like to see gyn.     7. Insomnia.  Continue trazodone.  Doing well with this and lifestyle measures.     8. History of colon polyp.  Had colonoscopy June 2020 by Dr. Coyle.  Next due in 5 years, 6/2025.     9. Asthma, moderate persistent.  Controlled with Symbicort.     10. Herpes labialis.  Prn valacyclovir.    11. Reactive depression.  Doing well with escitalopram.     12. Routine health maint.  Pneumococcal vaccines UTD.  Shingrix done at pharmacy.  Mammogram UTD.  Covid-19 vaccine done.  Flu vaccine UTD.         Outpatient Medications Prior to Visit   Medication Sig Dispense Refill    aspirin 81 MG EC tablet Take  by mouth.      Calcium Carb-Cholecalciferol (Calcium 1000 + D) 1000-800 MG-UNIT tablet Daily.      Cetirizine HCl 10 MG capsule Take  by mouth.      Cholecalciferol (VITAMIN D3) 5000 UNITS capsule capsule Take 1,000 Units by mouth daily.      escitalopram (LEXAPRO) 20 MG tablet TAKE 1 TABLET EVERY DAY 90 tablet 3    estradiol (ESTRACE) 0.1 MG/GM vaginal cream Insert 2 g into the vagina 3 (Three) Times a Week. 42.5 g 3    fluticasone (FLONASE) 50 MCG/ACT nasal spray       isosorbide mononitrate (IMDUR) 60 MG 24 hr tablet  Take 1 tablet by mouth Daily. 90 tablet 3    lisinopril (PRINIVIL,ZESTRIL) 2.5 MG tablet TAKE 1 TABLET EVERY DAY 90 tablet 3    metoprolol tartrate (LOPRESSOR) 25 MG tablet TAKE 1 TABLET TWICE DAILY 180 tablet 10    Multiple Vitamin (MULTI VITAMIN DAILY PO) Take  by mouth.      Omega-3 Fatty Acids (FISH OIL) 1200 MG capsule capsule Take  by mouth.      pantoprazole (PROTONIX) 40 MG EC tablet TAKE 1 TABLET EVERY DAY 90 tablet 3    PROAIR  (90 Base) MCG/ACT inhaler       rosuvastatin (CRESTOR) 20 MG tablet Take 1 tablet by mouth Every Night. 90 tablet 3    Symbicort 160-4.5 MCG/ACT inhaler INHALE 2 PUFFS TWICE DAILY 3 each 3    traZODone (DESYREL) 100 MG tablet TAKE 1 TABLET BY MOUTH EVERY NIGHT. 90 tablet 10    VALACYCLOVIR HCL PO As Needed.       No facility-administered medications prior to visit.     No orders of the defined types were placed in this encounter.    [unfilled]  There are no discontinued medications.        Return in about 6 months (around 10/29/2024) for Medicare Wellness.      Answers for HPI/ROS submitted by the patient on 4/3/2023  What is the primary reason for your visit?: High Blood Pressure

## 2024-04-29 NOTE — PROGRESS NOTES
Date 4/29/2024      Following conveyed to me by sleep staff today:    Shanti Godinez Ali, DO  When I called patient to advise the pressure changes, she advised she figured out it was her own mistake, she didn't have her water tank pushed all the way in causing the high pressures. Now that she has realized this error. She does not want her pressures changed. She is fine with the pressures that its at without the pressure change.      ----    No change to original plan  Follow up as previously scheduled 10/10/2024.       NPI #: [id number]    Cedric Gomes DO  Sleep Medicine  [facility]  04/29/24

## 2024-05-23 ENCOUNTER — OFFICE VISIT (OUTPATIENT)
Dept: OBSTETRICS AND GYNECOLOGY | Facility: CLINIC | Age: 74
End: 2024-05-23
Payer: MEDICARE

## 2024-05-23 VITALS
WEIGHT: 149 LBS | HEIGHT: 64 IN | BODY MASS INDEX: 25.44 KG/M2 | SYSTOLIC BLOOD PRESSURE: 122 MMHG | DIASTOLIC BLOOD PRESSURE: 76 MMHG

## 2024-05-23 DIAGNOSIS — N94.10 FEMALE DYSPAREUNIA: ICD-10-CM

## 2024-05-23 DIAGNOSIS — L90.0 LICHEN SCLEROSUS ET ATROPHICUS: Primary | ICD-10-CM

## 2024-05-23 DIAGNOSIS — N95.2 VAGINAL ATROPHY: ICD-10-CM

## 2024-05-23 PROCEDURE — 99214 OFFICE O/P EST MOD 30 MIN: CPT | Performed by: NURSE PRACTITIONER

## 2024-05-23 PROCEDURE — 1159F MED LIST DOCD IN RCRD: CPT | Performed by: NURSE PRACTITIONER

## 2024-05-23 PROCEDURE — 3074F SYST BP LT 130 MM HG: CPT | Performed by: NURSE PRACTITIONER

## 2024-05-23 PROCEDURE — 3078F DIAST BP <80 MM HG: CPT | Performed by: NURSE PRACTITIONER

## 2024-05-23 PROCEDURE — 1160F RVW MEDS BY RX/DR IN RCRD: CPT | Performed by: NURSE PRACTITIONER

## 2024-05-23 RX ORDER — ESTRADIOL 4 UG/1
1 INSERT VAGINAL 2 TIMES WEEKLY
Qty: 2 EACH | Refills: 0 | COMMUNITY
Start: 2024-05-23

## 2024-05-23 RX ORDER — CLOBETASOL PROPIONATE 0.5 MG/G
1 OINTMENT TOPICAL 2 TIMES DAILY
Qty: 14 G | Refills: 0 | Status: SHIPPED | OUTPATIENT
Start: 2024-05-23 | End: 2024-05-30

## 2024-05-23 NOTE — PROGRESS NOTES
"New GYN Exam    CC- Here for pain with sex.     Colleen Lovell is a 73 y.o. female new patient who presents for pain with sex.     OB History          1    Para   1    Term   1            AB        Living             SAB        IAB        Ectopic        Molar        Multiple        Live Births                Reports pain in vaginal area with insertion; feels dry. Using estradiol cream twice weekly to help with UTI's but has not been consistent with applying it.  Noticed a small spot of blood last week.  Hx of TVT in her 40's.  Denies stress or urge UI.       Menarche: 16 y.o.  Menopause: 46 y.o.  HRT: only for a short period due to heart surgery  Current contraception: post menopausal status  History of abnormal Pap smear:  unk  History of abnormal mammogram: no  Family history of uterine, colon or ovarian cancer: no  Family history of breast cancer: yes - mom dx'd in later 's; maternal aunt x 2 with breast cancer  STD's: unk  Last pap smear: - NIL  Last mammogram: 2023- neg  Last DEXA: 2023-osteopenia; infusion caused leg cramps  Last colonoscopy: polyps  MAGY: none      Health Maintenance   Topic Date Due    RSV Vaccine - Adults (1 - 1-dose 60+ series) Never done    PAP SMEAR  2020    COVID-19 Vaccine (2023- season) 2024    ANNUAL WELLNESS VISIT  10/19/2024    BMI FOLLOWUP  10/19/2024    MAMMOGRAM  2024    DXA SCAN  2025    LIPID PANEL  2025    COLORECTAL CANCER SCREENING  2030    HEPATITIS C SCREENING  Completed    Pneumococcal Vaccine 65+  Completed    ZOSTER VACCINE  Addressed    INFLUENZA VACCINE  Discontinued    TDAP/TD VACCINES  Discontinued       Past Medical History:   Diagnosis Date    Abnormal liver function test     Allergic     Anxiety     Arthritis     Asthma     CAD (coronary artery disease)     , s/p CABG, \"-\"PET scan 2011, reversible ischemia on Lexiscan PET perfusion study  Dr.Janet Ocampo    Cancer Skin    " Cataract     Cholelithiasis      CT    Chronic UTI     Colon polyp     Depression     GERD (gastroesophageal reflux disease)     Heart disease     Herpes simplex type 1 infection     orolabial    Herpes zoster     2010    Hx of bone scan      11: sp -1.8, hip -0.5, fem neck -0.8 14: sp -1.7, hip -0.6, fem neck -1.0    Hyperlipidemia     Hypertension     Incidental lung nodule, less than or equal to 3mm     CTPET 2016 [place], no f/u needed    Insomnia     Internal hemorrhoids     Myocardial infarction     Obstructive sleep apnea     mild     Osteopenia     Dexa 11 sp -1.8 hip -0.5 fem neck -0.8    Papanicolaou smear     2011    Pregnancy         Rotator cuff tendinitis         Tubular adenoma of colon     , , cecal polyp, 2012, , adenoma with mild dysplasia 4 mm ascending colon       Past Surgical History:   Procedure Laterality Date    BLADDER SUSPENSION      BREAST IMPLANT SURGERY      BREAST SURGERY      Implants    CARDIAC CATHETERIZATION   &     CARDIAC SURGERY      COLONOSCOPY        - ascending colon adenoma with mild dysplasia.     COLONOSCOPY  2015    Dr Coyle - patient is to follow up in 2020    CORONARY ARTERY BYPASS GRAFT      COSMETIC SURGERY      EYE SURGERY      Cataract    LAPAROSCOPIC TUBAL LIGATION      ROTATOR CUFF REPAIR Left     TUBAL ABDOMINAL LIGATION           Current Outpatient Medications:     Cyanocobalamin (B-12) 1000 MCG capsule, , Disp: , Rfl:     aspirin 81 MG EC tablet, Take  by mouth., Disp: , Rfl:     Calcium Carb-Cholecalciferol (Calcium 1000 + D) 1000-800 MG-UNIT tablet, Daily., Disp: , Rfl:     Cetirizine HCl 10 MG capsule, Take  by mouth., Disp: , Rfl:     Cholecalciferol (VITAMIN D3) 5000 UNITS capsule capsule, Take 1,000 Units by mouth daily., Disp: , Rfl:     clobetasol (TEMOVATE) 0.05 % ointment, Apply 1 Application topically to  the appropriate area as directed 2 (Two) Times a Day for 7 days., Disp: 14 g, Rfl: 0    escitalopram (LEXAPRO) 20 MG tablet, TAKE 1 TABLET EVERY DAY, Disp: 90 tablet, Rfl: 3    Estradiol (Imvexxy Maintenance Pack) 4 MCG insert, Insert 1 capsule into the vagina 2 (Two) Times a Week., Disp: 2 each, Rfl: 0    fluticasone (FLONASE) 50 MCG/ACT nasal spray, , Disp: , Rfl:     isosorbide mononitrate (IMDUR) 60 MG 24 hr tablet, Take 1 tablet by mouth Daily., Disp: 90 tablet, Rfl: 3    lisinopril (PRINIVIL,ZESTRIL) 2.5 MG tablet, TAKE 1 TABLET EVERY DAY, Disp: 90 tablet, Rfl: 3    metoprolol tartrate (LOPRESSOR) 25 MG tablet, TAKE 1 TABLET TWICE DAILY, Disp: 180 tablet, Rfl: 10    Multiple Vitamin (MULTI VITAMIN DAILY PO), Take  by mouth., Disp: , Rfl:     Omega-3 Fatty Acids (FISH OIL) 1200 MG capsule capsule, Take  by mouth., Disp: , Rfl:     pantoprazole (PROTONIX) 40 MG EC tablet, TAKE 1 TABLET EVERY DAY, Disp: 90 tablet, Rfl: 3    PROAIR  (90 Base) MCG/ACT inhaler, , Disp: , Rfl:     rosuvastatin (CRESTOR) 20 MG tablet, Take 1 tablet by mouth Every Night., Disp: 90 tablet, Rfl: 3    Symbicort 160-4.5 MCG/ACT inhaler, INHALE 2 PUFFS TWICE DAILY, Disp: 3 each, Rfl: 3    traZODone (DESYREL) 100 MG tablet, TAKE 1 TABLET BY MOUTH EVERY NIGHT., Disp: 90 tablet, Rfl: 10    VALACYCLOVIR HCL PO, As Needed., Disp: , Rfl:     No Known Allergies    Social History     Tobacco Use    Smoking status: Never    Smokeless tobacco: Never   Vaping Use    Vaping status: Never Used   Substance Use Topics    Alcohol use: Never    Drug use: Never       Family History   Problem Relation Age of Onset    Brain cancer Father     Cancer Father         Brain cancer    Osteoporosis Mother     Breast cancer Mother 89        ductal    Arthritis Mother     Cancer Mother         Breast cancer    Hearing loss Mother     Hyperlipidemia Mother     Miscarriages / Stillbirths Mother     Diabetes Paternal Grandmother     Heart disease Maternal Aunt      "Breast cancer Maternal Aunt     Colon polyps Maternal Aunt     Hyperlipidemia Maternal Aunt     Hip fracture Maternal Aunt     Heart disease Maternal Aunt     Hyperlipidemia Maternal Aunt     Hyperlipidemia Maternal Aunt     Heart disease Maternal Uncle     Heart disease Maternal Uncle     Uterine cancer Neg Hx     Colon cancer Neg Hx     Ovarian cancer Neg Hx          Review of Systems   Genitourinary:  Positive for dyspareunia, vaginal bleeding and vaginal pain. Negative for dysuria and urinary incontinence.        /76   Ht 162.6 cm (64\")   Wt 67.6 kg (149 lb)   BMI 25.58 kg/m²     Physical Exam  Vitals reviewed.   Constitutional:       General: She is awake. She is not in acute distress.     Appearance: She is not ill-appearing.   Eyes:      Conjunctiva/sclera: Conjunctivae normal.   Pulmonary:      Effort: Pulmonary effort is normal. No respiratory distress.   Genitourinary:     Exam position: Supine.      Labia:         Right: Rash (erythematous/white scale patches that extends how to perianal area) present. No lesion.         Left: Rash (erythematous/white scale patches that extend down to perianal area) present. No lesion.       Vagina: Prolapsed vaginal walls present. No vaginal discharge, erythema, tenderness, bleeding or lesions.          Comments: Vaginal atrophy noted at introitus  Musculoskeletal:      Cervical back: Neck supple. No rigidity.   Skin:     General: Skin is warm and dry.      Capillary Refill: Capillary refill takes less than 2 seconds.   Neurological:      Mental Status: She is alert and oriented to person, place, and time.   Psychiatric:         Mood and Affect: Mood and affect normal.         Behavior: Behavior normal.            Assessment/Plan  1) Lichen sclerosis- ERX Clobetasol ointment; will need to biopsy area if no improvement  2) vaginal atrophy- apply Imvexxy vaginal suppository twice weekly  3) GYN HM: no pap indicated    4) STD screening: declines   5) Bone health: " Weight bearing exercise, dietary calcium recommendations and vitamin D reviewed.   6) Colon Health: recommend screening if not up to date  7) Body mass index is 25.58 kg/m². Diet and Exercise discussed  8) Smoking Status: No  9) MMG: UTD 11/2023  10) DEXA-UTD 4/2023  11)C scope-UTD      Diagnoses and all orders for this visit:    1. Lichen sclerosus et atrophicus (Primary)  -     clobetasol (TEMOVATE) 0.05 % ointment; Apply 1 Application topically to the appropriate area as directed 2 (Two) Times a Day for 7 days.  Dispense: 14 g; Refill: 0    2. Vaginal atrophy  -     Estradiol (Imvexxy Maintenance Pack) 4 MCG insert; Insert 1 capsule into the vagina 2 (Two) Times a Week.  Dispense: 2 each; Refill: 0    3. Female dyspareunia        Follow up prn and 2 weeks for re-evaluation    I spent 25 minutes on this encounter, before, during and after the visit, evaluating the patient, reviewing records and writing orders.      Rupali Bonilla, APRN  05/23/2024    08:34 EDT

## 2024-06-06 ENCOUNTER — OFFICE VISIT (OUTPATIENT)
Dept: OBSTETRICS AND GYNECOLOGY | Facility: CLINIC | Age: 74
End: 2024-06-06
Payer: MEDICARE

## 2024-06-06 VITALS
HEIGHT: 64 IN | WEIGHT: 146 LBS | SYSTOLIC BLOOD PRESSURE: 120 MMHG | DIASTOLIC BLOOD PRESSURE: 78 MMHG | BODY MASS INDEX: 24.92 KG/M2

## 2024-06-06 DIAGNOSIS — L90.0 LICHEN SCLEROSUS ET ATROPHICUS: ICD-10-CM

## 2024-06-06 DIAGNOSIS — N95.2 VAGINAL ATROPHY: Primary | ICD-10-CM

## 2024-06-06 NOTE — PROGRESS NOTES
"Subjective     Chief Complaint   Patient presents with    Follow-up       Colleen Lovell is a 73 y.o.  whose LMP is No LMP recorded. Patient is postmenopausal.. This patient is new to me- no.  She presents with f/u LS/vaginal atrophy    HPI    Started using Imvexxy 4mg 2 weeks ago for vaginal atrophy noted at introitus; applied twice a week with no improvement.  Continues to have pain with sex/ insertion.  Was applying estradiol cream twice weekly to outer vaginal area to help with UTI's but was not consistent with applying it.  Given RX for steroid cream to outer area twice weekly x 1 week- notices an improvement but rash is still present.  Denies stress or urge UI; hx of TVT in her 40's.        The following portions of the patient's history were reviewed and updated as appropriate:vital signs, allergies, current medications, past medical history, past social history, past surgical history, and problem list      Review of Systems     Review of Systems   Genitourinary:  Positive for dyspareunia. Negative for urinary incontinence and vaginal pain.       Objective      /78   Ht 162.6 cm (64\")   Wt 66.2 kg (146 lb)   BMI 25.06 kg/m²     Physical Exam    Physical Exam  Vitals reviewed.   Constitutional:       General: She is awake. She is not in acute distress.     Appearance: She is not ill-appearing.   Eyes:      Conjunctiva/sclera: Conjunctivae normal.   Pulmonary:      Effort: Pulmonary effort is normal. No respiratory distress.   Genitourinary:     Exam position: Supine.      Labia:         Right: Rash present. No lesion.         Left: Rash present. No lesion.       Vagina: Prolapsed vaginal walls present. No erythema or bleeding.      Comments: Improved erythema/rash around both labias that extend down perineum  Continues to have vaginal atrophy noted at introitus  Musculoskeletal:      Cervical back: Neck supple. No rigidity.   Skin:     General: Skin is warm and dry.      Capillary Refill: " Capillary refill takes less than 2 seconds.   Neurological:      Mental Status: She is alert and oriented to person, place, and time.   Psychiatric:         Mood and Affect: Mood and affect normal.         Behavior: Behavior normal.           Lab Review   Labs: No data reviewed     Imaging: No data reviewed      Assessment/Plan  Diagnoses and all orders for this visit:    1. Vaginal atrophy (Primary)  -     Estradiol 7.5 MCG/24HR ring; Insert 1 applicator into the vagina Every 3 (Three) Months.  Dispense: 1 each; Refill: 3    2. Lichen sclerosus et atrophicus    Improvement noted with Clobetasol cream; rec to continue to apply BID for another 7 days.  Switch to vaginal e-string for atrophy.  Discussed how to apply.      Return in about 2 months (around 8/6/2024).    I spent 15 minutes caring for Colleen on this date of service. This time includes time spent by me in the following activities: preparing for the visit, obtaining and/or reviewing a separately obtained history, performing a medically appropriate examination and/or evaluation, counseling and educating the patient/family/caregiver, ordering medications, tests, or procedures, and documenting information in the medical record.     Rupali Bonilla, KYLAH  6/7/2024  13:04 EDT

## 2024-07-23 ENCOUNTER — PATIENT ROUNDING (BHMG ONLY) (OUTPATIENT)
Dept: URGENT CARE | Facility: CLINIC | Age: 74
End: 2024-07-23
Payer: MEDICARE

## 2024-07-23 NOTE — ED NOTES
Thank you for letting us care for you in your recent visit to our urgent care center. We would love to hear about your experience with us. Was this the first time you have visited our location?    We’re always looking for ways to make our patients’ experiences even better. Do you have any recommendations on ways we may improve?     I appreciate you taking the time to respond. Please be on the lookout for a survey about your recent visit from [employer] via text or email. We would greatly appreciate if you could fill that out and turn it back in. We want your voice to be heard and we value your feedback.   Thank you for choosing [facility] for your healthcare needs.

## 2024-08-05 ENCOUNTER — OFFICE VISIT (OUTPATIENT)
Dept: OBSTETRICS AND GYNECOLOGY | Facility: CLINIC | Age: 74
End: 2024-08-05
Payer: MEDICARE

## 2024-08-05 VITALS
HEIGHT: 64 IN | DIASTOLIC BLOOD PRESSURE: 60 MMHG | WEIGHT: 152 LBS | BODY MASS INDEX: 25.95 KG/M2 | SYSTOLIC BLOOD PRESSURE: 122 MMHG

## 2024-08-05 DIAGNOSIS — N94.10 FEMALE DYSPAREUNIA: ICD-10-CM

## 2024-08-05 DIAGNOSIS — L90.0 LICHEN SCLEROSUS ET ATROPHICUS: ICD-10-CM

## 2024-08-05 DIAGNOSIS — N95.2 VAGINAL ATROPHY: Primary | ICD-10-CM

## 2024-08-05 PROCEDURE — 3078F DIAST BP <80 MM HG: CPT | Performed by: NURSE PRACTITIONER

## 2024-08-05 PROCEDURE — 3074F SYST BP LT 130 MM HG: CPT | Performed by: NURSE PRACTITIONER

## 2024-08-05 PROCEDURE — 1159F MED LIST DOCD IN RCRD: CPT | Performed by: NURSE PRACTITIONER

## 2024-08-05 PROCEDURE — 1160F RVW MEDS BY RX/DR IN RCRD: CPT | Performed by: NURSE PRACTITIONER

## 2024-08-05 PROCEDURE — 99213 OFFICE O/P EST LOW 20 MIN: CPT | Performed by: NURSE PRACTITIONER

## 2024-08-05 RX ORDER — ESTRADIOL 0.1 MG/G
2 CREAM VAGINAL 2 TIMES WEEKLY
Qty: 42.5 G | Refills: 6 | Status: SHIPPED | OUTPATIENT
Start: 2024-08-05

## 2024-08-05 NOTE — PROGRESS NOTES
"Subjective     Chief Complaint   Patient presents with    Follow-up       Colleen Lovell is a 73 y.o.  whose LMP is No LMP recorded. Patient is postmenopausal.. This patient is new to me- no.  She presents with f/u vaginal atrophy    HPI    Switched to Estring from Estrace cream 2 months ago.  Was applying estrogen cream to outer vaginal area but was not consistent with it.  Currently has Estring in right now; unable to remove- says the opening to vagina is narrow.  Denies itching or burning.  Has not had sexual activity since the e-string is in place.  Went to pelvic floor therapy in the past; gave her a dilator but didn't need it anymore, so threw it away.        The following portions of the patient's history were reviewed and updated as appropriate:vital signs, allergies, current medications, past medical history, past social history, past surgical history, and problem list      Review of Systems     Review of Systems   Genitourinary:  Negative for vaginal bleeding and vaginal pain.       Objective      /60   Ht 162.6 cm (64\")   Wt 68.9 kg (152 lb)   Breastfeeding No   BMI 26.09 kg/m²     Physical Exam    Physical Exam  Vitals reviewed.   Constitutional:       General: She is awake. She is not in acute distress.     Appearance: She is not ill-appearing.   Eyes:      Conjunctiva/sclera: Conjunctivae normal.   Pulmonary:      Effort: Pulmonary effort is normal. No respiratory distress.   Genitourinary:     Exam position: Supine.      Labia:         Right: No rash.         Left: No rash.       Vagina: Foreign body (estring in posterior vaginal vault) present. Prolapsed vaginal walls present. No vaginal discharge, erythema, tenderness or bleeding.          Comments: Erythematous rash in perineal area  Vaginal atrophy noted at vaginal introitus  Musculoskeletal:      Cervical back: Neck supple. No rigidity.   Skin:     General: Skin is warm and dry.      Capillary Refill: Capillary refill takes " less than 2 seconds.   Neurological:      Mental Status: She is alert and oriented to person, place, and time.   Psychiatric:         Mood and Affect: Mood and affect normal.         Behavior: Behavior normal.           Lab Review   Labs: Urine pregnancy test     Imaging: No data reviewed      Assessment/Plan  Diagnoses and all orders for this visit:    1. Vaginal atrophy (Primary)  -     POC Urinalysis Dipstick  -     estradiol (ESTRACE) 0.1 MG/GM vaginal cream; Insert 2 g into the vagina 2 (Two) Times a Week.  Dispense: 42.5 g; Refill: 6  -     Ambulatory Referral to Physical Therapy for Evaluation & Treatment    2. Lichen sclerosus et atrophicus    3. Female dyspareunia  -     Ambulatory Referral to Physical Therapy for Evaluation & Treatment      Estring removed without difficulty using forceps.  No tearing or lesions noted on inside of vaginal canal.  Rec to switch back to Estrace but use vaginal suppositories.  Referred to pelvic floor therapy.        Return if symptoms worsen or fail to improve.    I spent 15 minutes caring for Colleen on this date of service. This time includes time spent by me in the following activities: preparing for the visit, reviewing tests, performing a medically appropriate examination and/or evaluation, counseling and educating the patient/family/caregiver, referring and communicating with other health care professionals, documenting information in the medical record, and ordering medications.     Rupali Bonilla, APRN  8/5/2024  12:46 EDT

## 2024-08-07 DIAGNOSIS — I25.810 CORONARY ARTERY DISEASE INVOLVING AUTOLOGOUS ARTERY CORONARY BYPASS GRAFT WITHOUT ANGINA PECTORIS: ICD-10-CM

## 2024-08-07 RX ORDER — ISOSORBIDE MONONITRATE 60 MG/1
60 TABLET, EXTENDED RELEASE ORAL DAILY
Qty: 90 TABLET | Refills: 3 | Status: SHIPPED | OUTPATIENT
Start: 2024-08-07

## 2024-08-09 DIAGNOSIS — L90.0 LICHEN SCLEROSUS ET ATROPHICUS: ICD-10-CM

## 2024-08-13 RX ORDER — CLOBETASOL PROPIONATE 0.5 MG/G
OINTMENT TOPICAL
Qty: 15 G | Refills: 0 | Status: SHIPPED | OUTPATIENT
Start: 2024-08-13

## 2024-08-29 DIAGNOSIS — I25.810 CORONARY ARTERY DISEASE INVOLVING AUTOLOGOUS ARTERY CORONARY BYPASS GRAFT WITHOUT ANGINA PECTORIS: ICD-10-CM

## 2024-08-29 DIAGNOSIS — I10 BENIGN ESSENTIAL HYPERTENSION: ICD-10-CM

## 2024-08-29 RX ORDER — LISINOPRIL 2.5 MG/1
TABLET ORAL
Qty: 90 TABLET | Refills: 3 | Status: SHIPPED | OUTPATIENT
Start: 2024-08-29

## 2024-09-26 ENCOUNTER — OFFICE VISIT (OUTPATIENT)
Dept: INTERNAL MEDICINE | Facility: CLINIC | Age: 74
End: 2024-09-26
Payer: MEDICARE

## 2024-09-26 VITALS
OXYGEN SATURATION: 97 % | SYSTOLIC BLOOD PRESSURE: 128 MMHG | WEIGHT: 160.8 LBS | DIASTOLIC BLOOD PRESSURE: 74 MMHG | HEART RATE: 70 BPM | BODY MASS INDEX: 27.45 KG/M2 | HEIGHT: 64 IN | TEMPERATURE: 97.8 F

## 2024-09-26 DIAGNOSIS — M25.562 ACUTE PAIN OF LEFT KNEE: Primary | ICD-10-CM

## 2024-09-26 DIAGNOSIS — J30.89 NON-SEASONAL ALLERGIC RHINITIS, UNSPECIFIED TRIGGER: ICD-10-CM

## 2024-09-26 DIAGNOSIS — H65.93 MIDDLE EAR EFFUSION, BILATERAL: ICD-10-CM

## 2024-09-26 DIAGNOSIS — B00.1 HERPES LABIALIS: ICD-10-CM

## 2024-09-26 PROCEDURE — 1126F AMNT PAIN NOTED NONE PRSNT: CPT | Performed by: FAMILY MEDICINE

## 2024-09-26 PROCEDURE — 99214 OFFICE O/P EST MOD 30 MIN: CPT | Performed by: FAMILY MEDICINE

## 2024-09-26 PROCEDURE — 1159F MED LIST DOCD IN RCRD: CPT | Performed by: FAMILY MEDICINE

## 2024-09-26 PROCEDURE — 3074F SYST BP LT 130 MM HG: CPT | Performed by: FAMILY MEDICINE

## 2024-09-26 PROCEDURE — 1160F RVW MEDS BY RX/DR IN RCRD: CPT | Performed by: FAMILY MEDICINE

## 2024-09-26 PROCEDURE — 3078F DIAST BP <80 MM HG: CPT | Performed by: FAMILY MEDICINE

## 2024-09-26 RX ORDER — CELECOXIB 200 MG/1
200 CAPSULE ORAL DAILY
Qty: 30 CAPSULE | Refills: 1 | Status: SHIPPED | OUTPATIENT
Start: 2024-09-26

## 2024-09-26 RX ORDER — VALACYCLOVIR HYDROCHLORIDE 1 G/1
2000 TABLET, FILM COATED ORAL 2 TIMES DAILY
Qty: 32 TABLET | Refills: 2 | Status: SHIPPED | OUTPATIENT
Start: 2024-09-26 | End: 2024-09-27

## 2024-10-01 ENCOUNTER — OFFICE VISIT (OUTPATIENT)
Dept: ORTHOPEDIC SURGERY | Facility: CLINIC | Age: 74
End: 2024-10-01
Payer: MEDICARE

## 2024-10-01 VITALS
WEIGHT: 160 LBS | HEART RATE: 60 BPM | BODY MASS INDEX: 27.31 KG/M2 | HEIGHT: 64 IN | SYSTOLIC BLOOD PRESSURE: 110 MMHG | DIASTOLIC BLOOD PRESSURE: 68 MMHG

## 2024-10-01 DIAGNOSIS — M25.462 EFFUSION OF LEFT KNEE: ICD-10-CM

## 2024-10-01 DIAGNOSIS — M25.562 LEFT KNEE PAIN, UNSPECIFIED CHRONICITY: ICD-10-CM

## 2024-10-01 DIAGNOSIS — M94.262 CHONDROMALACIA, LEFT KNEE: Primary | ICD-10-CM

## 2024-10-01 RX ORDER — LIDOCAINE HYDROCHLORIDE 10 MG/ML
8 INJECTION, SOLUTION EPIDURAL; INFILTRATION; INTRACAUDAL; PERINEURAL
Status: COMPLETED | OUTPATIENT
Start: 2024-10-01 | End: 2024-10-01

## 2024-10-01 RX ORDER — VALACYCLOVIR HYDROCHLORIDE 1 G/1
TABLET, FILM COATED ORAL
COMMUNITY
Start: 2024-09-26

## 2024-10-01 RX ORDER — TRIAMCINOLONE ACETONIDE 40 MG/ML
80 INJECTION, SUSPENSION INTRA-ARTICULAR; INTRAMUSCULAR
Status: COMPLETED | OUTPATIENT
Start: 2024-10-01 | End: 2024-10-01

## 2024-10-01 RX ADMIN — LIDOCAINE HYDROCHLORIDE 8 ML: 10 INJECTION, SOLUTION EPIDURAL; INFILTRATION; INTRACAUDAL; PERINEURAL at 10:50

## 2024-10-01 RX ADMIN — TRIAMCINOLONE ACETONIDE 80 MG: 40 INJECTION, SUSPENSION INTRA-ARTICULAR; INTRAMUSCULAR at 10:50

## 2024-10-01 NOTE — PROGRESS NOTES
"Subjective:     Patient ID: Colleen Lovell is a 74 y.o. female.    Chief Complaint:  Left knee pain, new patient to examiner  History of Present Illness  History of Present Illness  The patient is a 74-year-old female who presents to the clinic today for evaluation of her left knee.    She began experiencing pain in her left knee on 09/05/2024. Despite her routine of walking 3 miles a day for about an hour, she had to reduce this activity due to the pain. An attempt to resume her usual routine at the start of the month led to a significant increase in pain, particularly on the inner side of the knee. She reports no known injury and describes the pain as stabbing, rating it a 1 out of 10. The pain intensifies when she ices the knee, descends stairs, or walks. Resting did not alleviate the pain, and resuming activity was unsuccessful due to the discomfort.    She has been taking Celebrex, which has somewhat improved her symptoms, but the knee pain persists. She reports no locking, catching, or giving way of the knee, and is unaware of any swelling. She has no other concerns at present.       Social History     Occupational History    Not on file   Tobacco Use    Smoking status: Never     Passive exposure: Never    Smokeless tobacco: Never   Vaping Use    Vaping status: Never Used   Substance and Sexual Activity    Alcohol use: Never    Drug use: Never    Sexual activity: Yes     Partners: Male     Birth control/protection: Tubal ligation, Surgical      Past Medical History:   Diagnosis Date    Abnormal liver function test     Allergic     Ankle sprain     Anxiety 2023    Arthritis     Asthma     Bursitis of hip     CAD (coronary artery disease)     1997, s/p CABG, \"-\"PET scan 06/2011, reversible ischemia on Lexiscan PET perfusion study 12/16 Dr.Janet Ocampo    Cancer Skin    Cataract     Cholelithiasis     5/13 CT    Chronic UTI     Colon polyp     Depression 2023    Frozen shoulder     GERD (gastroesophageal reflux " disease)     Heart disease     Herpes simplex type 1 infection     orolabial    Herpes zoster     2010    Hx of bone scan      11: sp -1.8, hip -0.5, fem neck -0.8 14: sp -1.7, hip -0.6, fem neck -1.0    Hyperlipidemia     Hypertension     Incidental lung nodule, less than or equal to 3mm     CTPET 2016 [place], no f/u needed    Insomnia     Internal hemorrhoids     Myocardial infarction     Neck strain     Obstructive sleep apnea     mild     Osteopenia     Dexa 11 sp -1.8 hip -0.5 fem neck -0.8    Papanicolaou smear     2011    Pregnancy         Rotator cuff tendinitis         Tubular adenoma of colon     , , cecal polyp, 2012, , adenoma with mild dysplasia 4 mm ascending colon     Past Surgical History:   Procedure Laterality Date    BLADDER SUSPENSION      BREAST IMPLANT SURGERY      BREAST SURGERY      Implants    CARDIAC CATHETERIZATION   &     CARDIAC SURGERY      COLONOSCOPY        - ascending colon adenoma with mild dysplasia.     COLONOSCOPY  2015    Dr Coyle - patient is to follow up in 2020    CORONARY ARTERY BYPASS GRAFT      COSMETIC SURGERY      EYE SURGERY      Cataract    LAPAROSCOPIC TUBAL LIGATION      ROTATOR CUFF REPAIR Left     TUBAL ABDOMINAL LIGATION         Family History   Problem Relation Age of Onset    Brain cancer Father     Cancer Father         Brain cancer    Osteoporosis Mother     Breast cancer Mother 89        ductal    Arthritis Mother     Cancer Mother         Breast cancer    Hearing loss Mother     Hyperlipidemia Mother     Miscarriages / Stillbirths Mother     Diabetes Paternal Grandmother     Heart disease Maternal Aunt     Breast cancer Maternal Aunt     Colon polyps Maternal Aunt     Hyperlipidemia Maternal Aunt     Hip fracture Maternal Aunt     Heart disease Maternal Aunt     Hyperlipidemia Maternal Aunt     Hyperlipidemia Maternal  "Aunt     Heart disease Maternal Uncle     Heart disease Maternal Uncle     Uterine cancer Neg Hx     Colon cancer Neg Hx     Ovarian cancer Neg Hx                Objective:  Physical Exam    Vital signs reviewed.   General: No acute distress.  Eyes: conjunctiva clear; pupils equally round and reactive  ENT: external ears and nose atraumatic; oropharynx clear  CV: no peripheral edema  Resp: normal respiratory effort  Skin: no rashes or wounds; normal turgor  Psych: mood and affect appropriate; recent and remote memory intact    Vitals:    10/01/24 1012   BP: 110/68   Pulse: 60   Weight: 72.6 kg (160 lb)   Height: 162.6 cm (64\")         10/01/24  1012   Weight: 72.6 kg (160 lb)     Body mass index is 27.46 kg/m².      Ortho Exam     Physical Exam  Examination of the left knee shows a range of motion from 0 to 125 degrees. It is stable to varus and valgus stress at 0 degrees and 30 degrees. There is 1+ anterior Lachman exam. Anterior and posterior drawer exams are negative. Medial and lateral Carito's exams are negative. Moderate swelling is present, but no effusion. Palpable swelling is noted in the posterior joint line. Active patellar compression test is positive. Crepitus is positive throughout the arc of motion. Quad strength is 4+ out of 5. Sensation to light touch is positive in all distributions of the left lower extremity.    Imaging:  Left Knee X-Ray  Indication: Pain    AP, Lateral, and [place] views    Findings:  No fracture  No bony lesion  Normal soft tissues  Mild tricompartmental narrowing no evidence of advanced arthritis no other acute osseous abnormality Denti-Foam x-ray imaging    No prior studies were available for comparison.    Assessment:        1. Left knee pain, unspecified chronicity    2. Chondromalacia, left knee    3. Effusion of left knee         Assessment & Plan  1. Left knee pain.  The pain began on 9/5/2024 and has been exacerbated by resuming walking activities. The pain is " stabbing in nature and worsens with icing, descending stairs, and walking. She reports no known injury, locking, catching, or giving way of the knee. Examination revealed moderate swelling, positive active patellar compression test, positive crepitus throughout the arc of motion, and quad strength of 4+ out of 5.   She will continue taking Celebrex. A corticosteroid injection in the left knee was administered, which may take 3 to 7 days for significant symptom resolution. Ice is recommended at the injection site this evening. She should hold off on walking for the next 3 days and avoid walking for exercise for the next 3 weeks. Quad and hamstring strengthening exercises were discussed. IT band stretches were also recommended due to her history of greater trochanteric bursitis irritation.      Large Joint Arthrocentesis: L knee  Date/Time: 10/1/2024 10:50 AM  Consent given by: patient  Site marked: site marked  Timeout: Immediately prior to procedure a time out was called to verify the correct patient, procedure, equipment, support staff and site/side marked as required   Supporting Documentation  Indications: pain   Procedure Details  Location: knee - L knee  Preparation: Patient was prepped and draped in the usual sterile fashion  Needle size: 22 G  Approach: anterolateral  Medications administered: 8 mL lidocaine PF 1% 1 %; 80 mg triamcinolone acetonide 40 MG/ML  Patient tolerance: patient tolerated the procedure well with no immediate complications          Orders:  Orders Placed This Encounter   Procedures    Large Joint Arthrocentesis: L knee    XR Knee 3 View Left     No orders of the defined types were placed in this encounter.          Dragon dictation utilized          Patient or patient representative verbalized consent for the use of Ambient Listening during the visit with  KYLAH Baeza for chart documentation. 10/1/2024  12:14 EDT

## 2024-10-07 ENCOUNTER — PATIENT ROUNDING (BHMG ONLY) (OUTPATIENT)
Dept: ORTHOPEDIC SURGERY | Facility: CLINIC | Age: 74
End: 2024-10-07
Payer: MEDICARE

## 2024-10-23 ENCOUNTER — OFFICE VISIT (OUTPATIENT)
Dept: CARDIOLOGY | Facility: CLINIC | Age: 74
End: 2024-10-23
Payer: MEDICARE

## 2024-10-23 ENCOUNTER — TRANSCRIBE ORDERS (OUTPATIENT)
Dept: ADMINISTRATIVE | Facility: HOSPITAL | Age: 74
End: 2024-10-23
Payer: MEDICARE

## 2024-10-23 VITALS
HEART RATE: 77 BPM | HEIGHT: 64 IN | BODY MASS INDEX: 26.69 KG/M2 | SYSTOLIC BLOOD PRESSURE: 124 MMHG | OXYGEN SATURATION: 97 % | DIASTOLIC BLOOD PRESSURE: 72 MMHG | WEIGHT: 156.3 LBS

## 2024-10-23 DIAGNOSIS — I10 PRIMARY HYPERTENSION: ICD-10-CM

## 2024-10-23 DIAGNOSIS — E78.2 MIXED HYPERLIPIDEMIA: ICD-10-CM

## 2024-10-23 DIAGNOSIS — I10 BENIGN ESSENTIAL HYPERTENSION: ICD-10-CM

## 2024-10-23 DIAGNOSIS — Z12.31 SCREENING MAMMOGRAM, ENCOUNTER FOR: Primary | ICD-10-CM

## 2024-10-23 DIAGNOSIS — I20.89 CHRONIC STABLE ANGINA: Primary | ICD-10-CM

## 2024-10-23 DIAGNOSIS — I25.810 CORONARY ARTERY DISEASE INVOLVING AUTOLOGOUS ARTERY CORONARY BYPASS GRAFT WITHOUT ANGINA PECTORIS: ICD-10-CM

## 2024-10-23 NOTE — PROGRESS NOTES
[facility]    Subjective:     Encounter Date:10/23/24      Patient ID: Colleen Lovell is a 74 y.o. female.    Chief Complaint:   Chief Complaint   Patient presents with    Follow-up      History of Present Illness    Ms. Lovell is a pleasant 74 y.o. lady past medical history CAD status post CABG 1996, hypertension, hyperlipidemia who presents for further evaluation of chest pain.  She initially presented to me to an episode of chest pain, which was likely musculoskeletal due to undetectable high-sensitivity troponins.    She was seen in the ED on March 26, 2023.  She had a cough of productive of clear to white sputum over the last 2 weeks and then she started develop some chest pressure episodes that happen briefly and just for a few seconds and then go away, it was thought to to be musculoskeletal in origin.  She had high sensitivity troponins that were checked at the time of presentation, and they were 6, and then undetectably low.  Low risk for future cardiac events.    Since her last visit, she has done well.  She has not noted any further the chest pain episodes.  She did have mild dyspnea symptoms at the last evaluation, and she was instructed to increase her Imdur to 60 mg daily, and since that time she has done very well.  She continues to do well on this regimen.  She has no life-limiting angina.  She does have some knee pain which holds her back a bit but she still tries to walk several miles per day.  She is not modifying her activities.    Cardiac history:    CAD status post CABG 1996    She underwent a stress PET at [facility] on October 19, 2020.  It showed a medium sized area of moderate to severe anterolateral ischemia with normal LV systolic function.  She subsequently underwent a cardiac catheterization on October 29, 2020.    Patent saphenous graft to LAD and OM, occluded saphenous graft to diagonal  Mild nonobstructive disease of RCA   of LAD  Normal LV systolic  "function  The abnormal stress test was thought secondary to the occluded diagonal     The following portions of the patient's history were reviewed and updated as appropriate: allergies, current medications, past family history, past medical history, past social history, past surgical history and problem list.    Past Medical History:   Diagnosis Date    Abnormal ECG 3/26/23    Abnormal liver function test     Allergic     Ankle sprain     Anxiety     Arthritis     Asthma     Bursitis of hip     CAD (coronary artery disease)     , s/p CABG, \"-\"PET scan 2011, reversible ischemia on Lexiscan PET perfusion study  Dr.Janet Ocampo    Cancer Skin    Cataract     Cholelithiasis      CT    Chronic UTI     Colon polyp     Depression     Frozen shoulder     GERD (gastroesophageal reflux disease)     Heart disease     Herpes simplex type 1 infection     orolabial    Herpes zoster     2010    Hx of bone scan      11: sp -1.8, hip -0.5, fem neck -0.8 14: sp -1.7, hip -0.6, fem neck -1.0    Hyperlipidemia     Hypertension     Incidental lung nodule, less than or equal to 3mm     CTPET 2016 [place], no f/u needed    Insomnia     Internal hemorrhoids     Myocardial infarction     Neck strain     Obstructive sleep apnea     mild     Osteopenia     Dexa 11 sp -1.8 hip -0.5 fem neck -0.8    Papanicolaou smear     2011    Pregnancy         Rotator cuff tendinitis     2004    Tubular adenoma of colon     , , cecal polyp, 2012, , adenoma with mild dysplasia 4 mm ascending colon       Past Surgical History:   Procedure Laterality Date    BLADDER SUSPENSION      BREAST IMPLANT SURGERY      BREAST SURGERY  1984    Implants    CARDIAC CATHETERIZATION   &     CARDIAC SURGERY      COLONOSCOPY        - ascending colon adenoma with mild dysplasia.     COLONOSCOPY  2015    Dr Coyle - patient is to follow up in March " "2020    CORONARY ARTERY BYPASS GRAFT  1997    COSMETIC SURGERY  1984    EYE SURGERY  2010    Cataract    LAPAROSCOPIC TUBAL LIGATION      ROTATOR CUFF REPAIR Left     TUBAL ABDOMINAL LIGATION  1990           ECG 12 Lead    Date/Time: 10/23/2024 9:40 AM  Performed by: Gabriel Bee MD    Authorized by: Gabriel Bee MD  Comparison: compared with previous ECG from 4/5/2023  Similar to previous ECG  Rhythm: sinus rhythm  Rate: normal  Conduction: conduction normal  ST Segments: ST segments normal  T inversion: I and aVL  QRS axis: normal  Other findings: non-specific ST-T wave changes    Clinical impression: non-specific ECG             Objective:     Vitals:    10/23/24 0902   BP: 124/72   Pulse: 77   SpO2: 97%   Weight: 70.9 kg (156 lb 4.8 oz)   Height: 162.6 cm (64\")         Constitutional:       Appearance: Healthy appearance. Not in distress.   Neck:      Vascular: JVD normal.   Pulmonary:      Effort: Pulmonary effort is normal.      Breath sounds: Normal breath sounds.   Cardiovascular:      PMI at left midclavicular line. Normal rate. Regular rhythm. Normal S2.       Murmurs: There is no murmur.   Pulses:     Intact distal pulses.   Edema:     Peripheral edema absent.   Skin:     General: Skin is warm and dry.   Neurological:      General: No focal deficit present.      Mental Status: Alert, oriented to person, place, and time and oriented to person, place and time.   Psychiatric:         Mood and Affect: Mood and affect normal.         Lab Review:     Lipid Panel          4/22/2024    11:19   Lipid Panel   Total Cholesterol 162    Triglycerides 52    HDL Cholesterol 76    VLDL Cholesterol 11    LDL Cholesterol  75      BUN   Date Value Ref Range Status   04/22/2024 12 8 - 23 mg/dL Final   03/26/2023 14 8 - 23 mg/dL Final   10/29/2020 13 7 - 20 mg/dL Final     Creatinine   Date Value Ref Range Status   04/22/2024 0.99 0.57 - 1.00 mg/dL Final   03/26/2023 0.75 0.57 - 1.00 mg/dL Final   10/29/2020 0.66 (L) 0.7 " - 1.5 mg/dL Final     Potassium   Date Value Ref Range Status   04/22/2024 4.1 3.5 - 5.2 mmol/L Final   03/26/2023 4.0 3.5 - 5.2 mmol/L Final   10/29/2020 3.9 3.5 - 5.1 mmol/L Final     ALT (SGPT)   Date Value Ref Range Status   04/22/2024 16 1 - 33 U/L Final   03/26/2023 16 1 - 33 U/L Final     AST (SGOT)   Date Value Ref Range Status   04/22/2024 20 1 - 32 U/L Final   03/26/2023 20 1 - 32 U/L Final     Performed  Holter monitor April 11, 2023:  Chest pain was reported during the monitoring period. Chest pain had no correlations. The predominant rhythm noted during the testing period was sinus rhythm. Premature atrial contractions occured rarely. Premature ventricular contractions occured rarely. There was one IVR lasting 8 beats. Sinoatrial node conduction was normal. No atrioventricular block noted.         Assessment:          Diagnosis Plan   1. Chronic stable angina        2. Coronary artery disease involving autologous artery coronary bypass graft without angina pectoris        3. Benign essential hypertension        4. Mixed hyperlipidemia        5. Primary hypertension                   Plan:         CAD status post CABG: Cardiac anatomy as depicted above  Dyspnea on exertion, her anginal equivalent.  Chronic stable angina  Last cath at [facility] in 2020, showed an occluded SVG to diagonal, resulting in a positive stress test, medical therapy was advised.  She has no further chest pain episodes.  Her dyspnea is much improved on Imdur 60.  She has no life-limiting symptoms.  She has nitroglycerin at home for as needed symptoms.  I did explain to her the difference between stable and unstable ischemic heart disease.  She knows when to call with any escalating symptoms.    LDL 54 -70 on Crestor 20.  Continue.  She may need Zetia if it continues to remain higher than 70.  Echo was obtained showing grade 1 diastolic dysfunction but otherwise unremarkable.  EF normal.   Continue beta-blocker and ACE  inhibitor  Aspirin 81 for life  Hyperlipidemia: Controlled per above  Hypertension: Very well controlled today, continue current regimen.   Hypersomnolence with nocturnal snoring.  She follows with the sleep clinic.     RTC 1 year.       Gabriel Bee MD  [facility]  10/23/24  10:31 EDT       Current Outpatient Medications:     aspirin 81 MG EC tablet, Take  by mouth., Disp: , Rfl:     Calcium Carb-Cholecalciferol (Calcium 1000 + D) 1000-800 MG-UNIT tablet, Daily., Disp: , Rfl:     celecoxib (CeleBREX) 200 MG capsule, Take 1 capsule by mouth Daily., Disp: 30 capsule, Rfl: 1    Cetirizine HCl 10 MG capsule, Take  by mouth., Disp: , Rfl:     Cholecalciferol (VITAMIN D3) 5000 UNITS capsule capsule, Take 1,000 Units by mouth daily., Disp: , Rfl:     Cyanocobalamin (B-12) 1000 MCG capsule, , Disp: , Rfl:     escitalopram (LEXAPRO) 20 MG tablet, TAKE 1 TABLET EVERY DAY, Disp: 90 tablet, Rfl: 3    estradiol (ESTRACE) 0.1 MG/GM vaginal cream, Insert 2 g into the vagina 2 (Two) Times a Week., Disp: 42.5 g, Rfl: 6    fluticasone (FLONASE) 50 MCG/ACT nasal spray, , Disp: , Rfl:     isosorbide mononitrate (IMDUR) 60 MG 24 hr tablet, TAKE 1 TABLET EVERY DAY, Disp: 90 tablet, Rfl: 3    lisinopril (PRINIVIL,ZESTRIL) 2.5 MG tablet, TAKE 1 TABLET EVERY DAY, Disp: 90 tablet, Rfl: 3    metoprolol tartrate (LOPRESSOR) 25 MG tablet, TAKE 1 TABLET TWICE DAILY, Disp: 180 tablet, Rfl: 10    Multiple Vitamin (MULTI VITAMIN DAILY PO), Take  by mouth., Disp: , Rfl:     Omega-3 Fatty Acids (FISH OIL) 1200 MG capsule capsule, Take  by mouth., Disp: , Rfl:     pantoprazole (PROTONIX) 40 MG EC tablet, TAKE 1 TABLET EVERY DAY, Disp: 90 tablet, Rfl: 3    PROAIR  (90 Base) MCG/ACT inhaler, , Disp: , Rfl:     rosuvastatin (CRESTOR) 20 MG tablet, Take 1 tablet by mouth Every Night., Disp: 90 tablet, Rfl: 3    Symbicort 160-4.5 MCG/ACT inhaler, INHALE 2 PUFFS TWICE DAILY, Disp: 3 each, Rfl: 3    traZODone (DESYREL) 100 MG  tablet, TAKE 1 TABLET BY MOUTH EVERY NIGHT., Disp: 90 tablet, Rfl: 10    valACYclovir (VALTREX) 1000 MG tablet, TAKE 2 TABLETS BY MOUTH TWICE DAILY AT ONSET OF COLD SORE, Disp: , Rfl:          Return in about 1 year (around 10/23/2025).      Part of this note may be an electronic transcription/translation of spoken language to printed text using the Dragon Dictation System.

## 2024-10-24 ENCOUNTER — OFFICE VISIT (OUTPATIENT)
Dept: SLEEP MEDICINE | Facility: HOSPITAL | Age: 74
End: 2024-10-24
Payer: MEDICARE

## 2024-10-24 VITALS
HEART RATE: 72 BPM | HEIGHT: 64 IN | SYSTOLIC BLOOD PRESSURE: 137 MMHG | WEIGHT: 155 LBS | OXYGEN SATURATION: 97 % | BODY MASS INDEX: 26.46 KG/M2 | DIASTOLIC BLOOD PRESSURE: 74 MMHG

## 2024-10-24 DIAGNOSIS — E66.3 OVERWEIGHT WITH BODY MASS INDEX (BMI) OF 26 TO 26.9 IN ADULT: ICD-10-CM

## 2024-10-24 DIAGNOSIS — G47.33 OBSTRUCTIVE SLEEP APNEA, ADULT: Primary | ICD-10-CM

## 2024-10-24 DIAGNOSIS — I10 ESSENTIAL HYPERTENSION: ICD-10-CM

## 2024-10-24 PROCEDURE — G0463 HOSPITAL OUTPT CLINIC VISIT: HCPCS

## 2024-10-24 NOTE — PROGRESS NOTES
[facility]  [address]  [address]  [place] [address]  Phone   Fax     SLEEP CLINIC FOLLOW UP PROGRESS NOTE.    Colleen Lovell  [id number]   1950  74 y.o.  female      PCP: Tracee Curran MD      Date of visit: 10/24/2024    Chief Complaint   Patient presents with    Sleep Apnea       Medications and allergies are reviewed by me and documented in the encounter.     SOCIAL (habits pertaining to sleep medicine)  History tobacco use:No   History of alcohol use: 0 per week  Caffeine use: 1 beverages/d    HPI:  This is a 74 y.o. PMHx HTN, depression, CAD (status post CABG in 1996).  Here to establish care for obstructive sleep apnea (SUMANTH 5.2/h on 11/27/2023 HST ordered by her cardiologist).. Patient is using positive airway pressure therapy and the symptoms of sleep apnea have improved significantly on the therapy. Normally patient goes to bed at 10 PM and wakes up at 7 AM .  The patient wakes up 1 time(s) during the night and has no problem going back to sleep.  Feels refreshed after waking up.     Overall patient's Impression of their PAP therapy is: Not well  Mask uncomfortable current is airfit F20  - also uses a dreamwear nasal but prefers an FFM   Airtouch F20 didn't work out well see below  No air pressure issues       Compliance data as reviewed by me with patient room today:  Date range 7/23/2024 - 10/20/2024  Overall use 93%  4 hour yuki 90%  average days used 6 hours 54 minutes  Device AirSense 10 AutoSet  Settings 9-20 cm H2O  EPR 2 full-time  95th percentile pressure 11.8 cm H2O  95th percentile leak is 23.4 LPM  AHI 0.6  DME: [facility]  Mask used: AirFit F20 - uncomfortable alternates between this an what sounds like a dreamwear nasal   Prior mask used:   AirTouch F20 FFM -  cause what sounds like like contact dermatitis - resolved after stopped      -Last seen in sleep clinic~6 months ago on 4/11/2024 to establish care after HST,  "cardiologist.  AHI was 0.6, DME [facility], AirTouch F20 FFM.  She felt air pressures too little.  95th% pressure was 12.8 cm H2O, EPR off.  Raise min pressure to 9 cm H2O added a ramp for 20 minutes with same pressure, and turned EPR on to 2.  -4/23/2024 patient called stating pressures too much, instructed sleep staff to: Decrease min pressure back down to 8 cm H2O and added ramp for 30 minutes with same pressure  -4/29/2024 sleep staff called the patient to tell her of my potential pressure change, the patient at that time stated she figured out her own mistake as she realized water tank was not pushed all the way and causing higher pressures, and requested no pressure change.    REVIEW OF SYSTEMS:   Is negative unless otherwise noted in HPI  [place] Sleepiness Scale :Total score: 3     Disclaimer History: The above history is based on this sleep physician's in room encounter with the patient. Pre encounter self administered questionnaires are taken into consideration and discussed with patient for any discordance. The above documentation by this sleep physician is the most accurate clinical information determined by in room sleep physician encounter with patient.     PHYSICAL EXAMINATION:  Vitals:    10/24/24 1500   BP: 137/74   Pulse: 72   SpO2: 97%   Weight: 70.3 kg (155 lb)   Height: 162.6 cm (64.02\")    Body mass index is 26.59 kg/m².   CONSTITUTIONAL: Well appearing, in no overt pain or respiratory distress   NOSE: No septal defect  RESP SYSTEM:  No overt respiratory distress, speaks in clear sentences without dyspnea, no accessory muscle use  CARDIOVASULAR: No edema noted  NEURO: Oriented x 3, no gross focal deficits         Compliance data as reviewed by me with patient room today:  Date range 7/23/2024 - 10/20/2024  Overall use 93%  4 hour yuki 90%  average days used 6 hours 54 minutes  Device AirSense 10 AutoSet  Settings 9-20 cm H2O  EPR 2 full-time  95th percentile pressure 11.8 cm H2O  95th percentile " leak is 23.4 LPM  AHI 0.6  DME: [facility]  Mask used: AirFit F20 - uncomfortable alternates between this an what sounds like a dreamwear nasal   Prior mask used:   AirTouch F20 FFM -  cause what sounds like like contact dermatitis - resolved after stopped    ASSESSMENT AND PLAN:  Obstructive sleep apnea ( G 47.33).    -Specific Changes made by me today:  I. After review of compliance data, in visit clinical correlation, and through shared decision making: will not make any changes to PAP therapy settings.  II.  Order placed for mask fitting with AirFit F40 Full face mask (she can choose any mask she wants)  III.  Offered sooner follow up she asked for annual - her AHI is at goal so no reason to pravin her otherwise - she will need RTO visit for any PAP therapy concerns to be addressed made her aware.  Counseled may request sooner follow-up to sleep clinic anytime the patient feels necessary.  The symptoms of sleep apnea have improved with the device and the treatment.  Patient's compliance with the device is excellent for treatment of sleep apnea.  I have independently reviewed the smart card down load and discussed with the patient the download data and encouarged the patient to continue to use the device.The residual AHI is acceptable. The device is benefiting the patient and the device is medically necessary.  Without proper control of sleep apnea and good compliance there is a increased risk for hypertension, diabetes mellitus and nonrestorative sleep with hypersomnia which can increase risk for motor vehicle accidents.  Untreated sleep apnea is also a risk factor for development of atrial fibrillation, pulmonary hypertension, insulin resistance and stroke. The patient is also instructed to get the supplies from the [facility] and and change them on a regular basis.  A prescription for supplies has been sent to the [facility].  I have also discussed the good sleep hygiene habits and adequate amount of sleep  needed for good health.  Overweight, with BMI is Body mass index is 26.59 kg/m².. Counseled weight loss will be beneficial for reduction in severity of sleep apnea, healthy diet/exercise to achieve same, follow up with primary care physician for serial monitoring and to further guide management.  Essential hypertension [I10], Compliant wit home therapies reviewed.  Counseled patient PAP therapy compliance for treatment of obstructive sleep apnea may be beneficial for this comorbid condition.  Follow-up with PCP as previous for hypertension       Follow up in 1 year at patient [facility] . Patient's questions were answered.        EMR Dragon/Transcription disclaimer:   Much of this encounter note is an electronic transcription/translation of spoken language to printed text. The electronic translation of spoken language may permit erroneous, or at times, nonsensical words or phrases to be inadvertently transcribed; Although I have reviewed the note for such errors, some may still exist.       NPI #: [id number]    Cedric Gomes, DO  Sleep Medicine  [facility]  10/24/24

## 2024-10-30 DIAGNOSIS — F32.9 REACTIVE DEPRESSION: ICD-10-CM

## 2024-10-31 ENCOUNTER — OFFICE VISIT (OUTPATIENT)
Dept: INTERNAL MEDICINE | Facility: CLINIC | Age: 74
End: 2024-10-31
Payer: MEDICARE

## 2024-10-31 VITALS
TEMPERATURE: 97.7 F | DIASTOLIC BLOOD PRESSURE: 72 MMHG | HEART RATE: 63 BPM | OXYGEN SATURATION: 97 % | SYSTOLIC BLOOD PRESSURE: 128 MMHG | HEIGHT: 64 IN | BODY MASS INDEX: 27.11 KG/M2 | WEIGHT: 158.8 LBS

## 2024-10-31 DIAGNOSIS — J30.89 NON-SEASONAL ALLERGIC RHINITIS, UNSPECIFIED TRIGGER: ICD-10-CM

## 2024-10-31 DIAGNOSIS — K21.9 LARYNGOPHARYNGEAL REFLUX: ICD-10-CM

## 2024-10-31 DIAGNOSIS — Z00.00 MEDICARE ANNUAL WELLNESS VISIT, SUBSEQUENT: Primary | ICD-10-CM

## 2024-10-31 DIAGNOSIS — F32.9 REACTIVE DEPRESSION: ICD-10-CM

## 2024-10-31 DIAGNOSIS — Z23 ENCOUNTER FOR ADMINISTRATION OF VACCINE: ICD-10-CM

## 2024-10-31 DIAGNOSIS — M81.0 AGE-RELATED OSTEOPOROSIS WITHOUT CURRENT PATHOLOGICAL FRACTURE: ICD-10-CM

## 2024-10-31 DIAGNOSIS — Z00.00 ROUTINE HEALTH MAINTENANCE: ICD-10-CM

## 2024-10-31 DIAGNOSIS — Z78.0 POSTMENOPAUSAL: ICD-10-CM

## 2024-10-31 DIAGNOSIS — Z86.0101 HISTORY OF ADENOMATOUS POLYP OF COLON: ICD-10-CM

## 2024-10-31 DIAGNOSIS — I25.810 CORONARY ARTERY DISEASE INVOLVING AUTOLOGOUS ARTERY CORONARY BYPASS GRAFT WITHOUT ANGINA PECTORIS: ICD-10-CM

## 2024-10-31 DIAGNOSIS — N39.0 URINARY TRACT INFECTION, RECURRENT: ICD-10-CM

## 2024-10-31 DIAGNOSIS — B00.1 HERPES LABIALIS: ICD-10-CM

## 2024-10-31 DIAGNOSIS — R73.03 PREDIABETES: ICD-10-CM

## 2024-10-31 DIAGNOSIS — G47.00 INSOMNIA, UNSPECIFIED TYPE: ICD-10-CM

## 2024-10-31 DIAGNOSIS — J45.40 MODERATE PERSISTENT ASTHMA WITHOUT COMPLICATION: ICD-10-CM

## 2024-10-31 PROBLEM — Z85.828 HISTORY OF BASAL CELL CARCINOMA (BCC) OF SKIN: Status: ACTIVE | Noted: 2024-10-31

## 2024-10-31 PROBLEM — G47.33 OSA ON CPAP: Status: ACTIVE | Noted: 2024-10-31

## 2024-10-31 NOTE — PROGRESS NOTES
Subjective   The ABCs of the Annual Wellness Visit  Medicare Wellness Visit      Colleen Lovell is a 74 y.o. patient who presents for a Medicare Wellness Visit.    The following portions of the patient's history were reviewed and   updated as appropriate: allergies, current medications, past family history, past medical history, past social history, past surgical history, and problem list.    Compared to one year ago, the patient's physical   health is the same.  Compared to one year ago, the patient's mental   health is the same.    Recent Hospitalizations:  She was not admitted to the hospital during the last year.     Current Medical Providers:  Patient Care Team:  Tracee Curran MD as PCP - General (Family Medicine)  Rupali Bonilla APRN as Nurse Practitioner (Obstetrics and Gynecology)  Gabriel Bee MD as Consulting Physician (Cardiology)  Cedric Gomes DO as Consulting Physician (Sleep Medicine)  Nadia Bonilla APRN as Nurse Practitioner (Nurse Practitioner)  Audelia Bolton MD (Dermatology)    Outpatient Medications Prior to Visit   Medication Sig Dispense Refill    aspirin 81 MG EC tablet Take  by mouth.      Calcium Carb-Cholecalciferol (Calcium 1000 + D) 1000-800 MG-UNIT tablet Daily.      Cetirizine HCl 10 MG capsule Take  by mouth.      Cholecalciferol (VITAMIN D3) 5000 UNITS capsule capsule Take 1,000 Units by mouth daily.      Cyanocobalamin (B-12) 1000 MCG capsule       escitalopram (LEXAPRO) 20 MG tablet TAKE 1 TABLET EVERY DAY 90 tablet 3    estradiol (ESTRACE) 0.1 MG/GM vaginal cream Insert 2 g into the vagina 2 (Two) Times a Week. 42.5 g 6    fluticasone (FLONASE) 50 MCG/ACT nasal spray       isosorbide mononitrate (IMDUR) 60 MG 24 hr tablet TAKE 1 TABLET EVERY DAY 90 tablet 3    lisinopril (PRINIVIL,ZESTRIL) 2.5 MG tablet TAKE 1 TABLET EVERY DAY 90 tablet 3    Magnesium 400 MG capsule       metoprolol tartrate (LOPRESSOR) 25 MG tablet TAKE 1 TABLET TWICE DAILY 180 tablet 10     Multiple Vitamin (MULTI VITAMIN DAILY PO) Take  by mouth.      Omega-3 Fatty Acids (FISH OIL) 1200 MG capsule capsule Take  by mouth.      pantoprazole (PROTONIX) 40 MG EC tablet TAKE 1 TABLET EVERY DAY 90 tablet 3    PROAIR  (90 Base) MCG/ACT inhaler       rosuvastatin (CRESTOR) 20 MG tablet Take 1 tablet by mouth Every Night. 90 tablet 3    Symbicort 160-4.5 MCG/ACT inhaler INHALE 2 PUFFS TWICE DAILY 3 each 3    traZODone (DESYREL) 100 MG tablet TAKE 1 TABLET BY MOUTH EVERY NIGHT. 90 tablet 10    valACYclovir (VALTREX) 1000 MG tablet TAKE 2 TABLETS BY MOUTH TWICE DAILY AT ONSET OF COLD SORE      celecoxib (CeleBREX) 200 MG capsule Take 1 capsule by mouth Daily. 30 capsule 1     No facility-administered medications prior to visit.     No opioid medication identified on active medication list. I have reviewed chart for other potential  high risk medication/s and harmful drug interactions in the elderly.      Aspirin is on active medication list. Aspirin use is indicated based on review of current medical condition/s. Pros and cons of this therapy have been discussed today. Benefits of this medication outweigh potential harm.  Patient has been encouraged to continue taking this medication.  .      Patient Active Problem List   Diagnosis    Atopic rhinitis    Herpes simplex type 1 infection    Impaired fasting glucose    Insomnia    Menopausal symptom    Age-related osteoporosis without current pathological fracture    Laryngopharyngeal reflux    Urinary tract infection, recurrent    Routine health maintenance    CAD (coronary artery disease)    Benign essential hypertension    Diastolic dysfunction    History of adenomatous polyp of colon    Nocturia    Prediabetes    Moderate persistent asthma without complication    Herpes labialis    Reactive depression    STACIA on CPAP     Advance Care Planning Advance Directive is on file.  ACP discussion was held with the patient during this visit. Patient has an advance  "directive in EMR which is still valid.             Objective   Vitals:    10/31/24 1016   BP: 128/72   BP Location: Left arm   Patient Position: Sitting   Cuff Size: Adult   Pulse: 63   Temp: 97.7 °F (36.5 °C)   TempSrc: Infrared   SpO2: 97%   Weight: 72 kg (158 lb 12.8 oz)   Height: 162.6 cm (64\")   PainSc: 0-No pain       Estimated body mass index is 27.26 kg/m² as calculated from the following:    Height as of this encounter: 162.6 cm (64\").    Weight as of this encounter: 72 kg (158 lb 12.8 oz).            Does the patient have evidence of cognitive impairment? No  Lab Results   Component Value Date    CHLPL 170 10/24/2024    TRIG 79 10/24/2024    HDL 79 (H) 10/24/2024    LDL 76 10/24/2024    VLDL 15 10/24/2024    HGBA1C 6.00 (H) 10/24/2024                                                                                                Health  Risk Assessment    Smoking Status:  Social History     Tobacco Use   Smoking Status Never    Passive exposure: Never   Smokeless Tobacco Never     Alcohol Consumption:  Social History     Substance and Sexual Activity   Alcohol Use Never       Fall Risk Screen  STEADI Fall Risk Assessment was completed, and patient is at LOW risk for falls.Assessment completed on:10/31/2024    Depression Screening:      10/31/2024    10:22 AM   PHQ-2/PHQ-9 Depression Screening   Little interest or pleasure in doing things Not at all   Feeling down, depressed, or hopeless Not at all     Health Habits and Functional and Cognitive Screening:      10/24/2024    12:04 PM   Functional & Cognitive Status   Do you have difficulty preparing food and eating? No    Do you have difficulty bathing yourself, getting dressed or grooming yourself? No    Do you have difficulty using the toilet? No    Do you have difficulty moving around from place to place? No    Do you have trouble with steps or getting out of a bed or a chair? No    Current Diet Frequent Junk Food    Dental Exam Up to date    Eye Exam Up " to date    Exercise (times per week) 4 times per week    Current Exercises Include Walking    Do you need help using the phone?  No    Are you deaf or do you have serious difficulty hearing?  No    Do you need help to go to places out of walking distance? No    Do you need help shopping? No    Do you need help preparing meals?  No    Do you need help with housework?  Yes    Do you need help with laundry? No    Do you need help taking your medications? No    Do you need help managing money? No    Do you ever drive or ride in a car without wearing a seat belt? No    Have you felt unusual stress, anger or loneliness in the last month? No    Who do you live with? Spouse    If you need help, do you have trouble finding someone available to you? No    Have you been bothered in the last four weeks by sexual problems? No    Do you have difficulty concentrating, remembering or making decisions? No        Patient-reported           Age-appropriate Screening Schedule:  Refer to the list below for future screening recommendations based on patient's age, sex and/or medical conditions. Orders for these recommended tests are listed in the plan section. The patient has been provided with a written plan.    Health Maintenance List  Health Maintenance   Topic Date Due    PAP SMEAR  02/06/2020    COVID-19 Vaccine (5 - 2023-24 season) 09/01/2024    MAMMOGRAM  11/20/2024    DXA SCAN  04/21/2025    BMI FOLLOWUP  08/05/2025    LIPID PANEL  10/24/2025    ANNUAL WELLNESS VISIT  10/31/2025    COLORECTAL CANCER SCREENING  06/22/2030    HEPATITIS C SCREENING  Completed    Pneumococcal Vaccine 65+  Completed    ZOSTER VACCINE  Addressed    INFLUENZA VACCINE  Discontinued    TDAP/TD VACCINES  Discontinued                                                                                                                                                CMS Preventative Services Quick Reference  Risk Factors Identified During Encounter  Immunizations  "Discussed/Encouraged: Influenza    The above risks/problems have been discussed with the patient.  Pertinent information has been shared with the patient in the After Visit Summary.  An After Visit Summary and PPPS were made available to the patient.    Follow Up:   Next Medicare Wellness visit to be scheduled in 1 year.         Additional E&M Note during same encounter follows:  Patient has additional, significant, and separately identifiable condition(s)/problem(s) that require work above and beyond the Medicare Wellness Visit     Chief Complaint  Medicare Wellness-subsequent and Hypertension    Subjective   HPI  Colleen is also being seen today for additional medical problem/s.    Review of Systems   Constitutional: Negative.    HENT: Negative.     Eyes: Negative.    Respiratory: Negative.     Cardiovascular: Negative.    Gastrointestinal: Negative.    Neurological: Negative.    Psychiatric/Behavioral: Negative.                Objective   Vital Signs:  /72 (BP Location: Left arm, Patient Position: Sitting, Cuff Size: Adult)   Pulse 63   Temp 97.7 °F (36.5 °C) (Infrared)   Ht 162.6 cm (64\")   Wt 72 kg (158 lb 12.8 oz)   SpO2 97%   BMI 27.26 kg/m²   Physical Exam  Vitals and nursing note reviewed.   Constitutional:       Appearance: Normal appearance. She is well-developed.   HENT:      Head: Normocephalic and atraumatic.      Right Ear: External ear normal.      Left Ear: External ear normal.      Nose: Nose normal.   Eyes:      General: No scleral icterus.     Conjunctiva/sclera: Conjunctivae normal.      Pupils: Pupils are equal, round, and reactive to light.   Neck:      Thyroid: No thyromegaly.   Cardiovascular:      Rate and Rhythm: Normal rate and regular rhythm.      Heart sounds: Normal heart sounds. No murmur heard.     No friction rub. No gallop.   Pulmonary:      Effort: Pulmonary effort is normal. No respiratory distress.      Breath sounds: Normal breath sounds. No wheezing or rales. "   Abdominal:      General: Bowel sounds are normal.      Palpations: Abdomen is soft.   Musculoskeletal:         General: No deformity.      Cervical back: Normal range of motion and neck supple.   Lymphadenopathy:      Cervical: No cervical adenopathy.   Skin:     General: Skin is warm and dry.      Findings: No rash.   Neurological:      Mental Status: She is alert and oriented to person, place, and time.      Cranial Nerves: No cranial nerve deficit.      Motor: No abnormal muscle tone.      Coordination: Coordination normal.      Deep Tendon Reflexes: Reflexes are normal and symmetric. Reflexes normal.   Psychiatric:         Behavior: Behavior normal.         Thought Content: Thought content normal.         Judgment: Judgment normal.                 Assessment and Plan               Medicare annual wellness visit, subsequent    Coronary artery disease involving autologous artery coronary bypass graft without angina pectoris    Non-seasonal allergic rhinitis, unspecified trigger    Laryngopharyngeal reflux    Prediabetes    Age-related osteoporosis without current pathological fracture    Urinary tract infection, recurrent    Insomnia, unspecified type    History of adenomatous polyp of colon    Moderate persistent asthma without complication          Herpes labialis    Reactive depression    Routine health maintenance    Postmenopausal    Encounter for administration of vaccine      Orders Placed This Encounter   Procedures    DEXA Bone Density Axial     Standing Status:   Future     Standing Expiration Date:   10/31/2025     Order Specific Question:   Reason for Exam:     Answer:   screening     Order Specific Question:   Release to patient     Answer:   Routine Release [[id number]]     Order Specific Question:   Reason for Exam:     Answer:   osteoporosis     Order Specific Question:   Does this patient have a diabetic monitoring/medication delivering device on?     Answer:   No     Order Specific Question:    Is patient taking or have taken long term Glucocorticoid (steroids)?     Answer:   No     Order Specific Question:   Does the patient have rheumatoid arthritis?     Answer:   No     Order Specific Question:   Does the patient have secondary osteoporosis?     Answer:   No    Fluzone High-Dose 65+yrs    Comprehensive Metabolic Panel     Standing Status:   Future     Standing Expiration Date:   11/1/2025     Order Specific Question:   Release to patient     Answer:   Routine Release [[id number]]    Hemoglobin A1c     Standing Status:   Future     Standing Expiration Date:   11/1/2025     Order Specific Question:   Release to patient     Answer:   Routine Release [[id number]]    Lipid Panel With / Chol / HDL Ratio     Standing Status:   Future     Standing Expiration Date:   11/1/2025     Order Specific Question:   Release to patient     Answer:   Routine Release [[id number]]    TSH     Standing Status:   Future     Standing Expiration Date:   11/1/2025     Order Specific Question:   Release to patient     Answer:   Routine Release [[id number]]    Vitamin B12     Standing Status:   Future     Standing Expiration Date:   11/1/2025     Order Specific Question:   Release to patient     Answer:   Routine Release [[id number]]    CBC (No Diff)     Standing Status:   Future     Standing Expiration Date:   10/31/2025     Order Specific Question:   Release to patient     Answer:   Routine Release [[id number]]       1. CAD.  On ASA, isosorbide, lisinopril, metoprolol, rosuvastatin.  Per Dr. Bee.     2. MAILE. Controlled with cetirizine and fluticasone nasal spray.      3. GERD.  Controlled with pantoprazole and tums.     4. Prediabetes.  A1c stable at 6.0.  Continue lifestyle measures.     5. Osteoporosis.  Didn't tolerate alendronate.  Didn't tolerate Prolia due to muscle cramps.  Dexa scan done 4/2023 showed improvement to osteopenia.  Calcium, vitamin D, weightbearing exercise.  Recheck Dexa 4/2025.     6. Recurrent UTI.  She  is using vaginal estrogen gyn.  She took daily cephalexin for 3 months.       7. Insomnia.  Continue trazodone.  Doing well with this and lifestyle measures.     8. History of colon polyp.  Had colonoscopy June 2020 by Dr. Coyle.  Next due in 5 years, 6/2025.     9. Asthma, moderate persistent.  Controlled with Symbicort.     10. Herpes labialis.  Prn valacyclovir.     11. Reactive depression.  Doing well with escitalopram.     12. Routine health maint.  Pneumococcal vaccines UTD.  Prevnar 20 next time.  Shingrix done at pharmacy.  Mammogram UTD.  Flu vaccine today.  RSV and Covid-19 vaccines at pharmacy.    13. STACIA.  Using and benefiting from CPAP.  Per Dr. Gomes.          Follow Up   Return in about 6 months (around 4/30/2025).  Patient was given instructions and counseling regarding her condition or for health maintenance advice. Please see specific information pulled into the AVS if appropriate.

## 2024-11-04 DIAGNOSIS — F32.9 REACTIVE DEPRESSION: ICD-10-CM

## 2024-11-04 RX ORDER — ESCITALOPRAM OXALATE 20 MG/1
20 TABLET ORAL DAILY
Qty: 90 TABLET | Refills: 3 | Status: SHIPPED | OUTPATIENT
Start: 2024-11-04

## 2024-11-04 RX ORDER — ESCITALOPRAM OXALATE 20 MG/1
20 TABLET ORAL DAILY
Qty: 90 TABLET | Refills: 3 | OUTPATIENT
Start: 2024-11-04

## 2024-11-04 NOTE — TELEPHONE ENCOUNTER
Rx Refill Note  Requested Prescriptions     Pending Prescriptions Disp Refills    escitalopram (LEXAPRO) 20 MG tablet [Pharmacy Med Name: Escitalopram Oxalate Oral Tablet 20 MG] 90 tablet 3     Sig: TAKE 1 TABLET EVERY DAY      Last office visit with prescribing clinician: 9/26/2024   Last telemedicine visit with prescribing clinician: Visit date not found   Next office visit with prescribing clinician: 10/31/2024                         Would you like a call back once the refill request has been completed: [] Yes [] No    If the office needs to give you a call back, can they leave a voicemail: [] Yes [] No    Eli Elizabeth MA  11/04/24, 13:19 EST

## 2024-11-20 DIAGNOSIS — I25.810 CORONARY ARTERY DISEASE INVOLVING AUTOLOGOUS ARTERY CORONARY BYPASS GRAFT WITHOUT ANGINA PECTORIS: ICD-10-CM

## 2024-11-20 DIAGNOSIS — R06.09 DYSPNEA ON EXERTION: ICD-10-CM

## 2024-11-20 RX ORDER — ROSUVASTATIN CALCIUM 20 MG/1
20 TABLET, COATED ORAL NIGHTLY
Qty: 90 TABLET | Refills: 3 | Status: SHIPPED | OUTPATIENT
Start: 2024-11-20

## 2024-12-11 ENCOUNTER — HOSPITAL ENCOUNTER (OUTPATIENT)
Dept: MAMMOGRAPHY | Facility: HOSPITAL | Age: 74
Discharge: HOME | End: 2024-12-11
Admitting: FAMILY MEDICINE
Payer: MEDICARE

## 2024-12-11 DIAGNOSIS — Z12.31 SCREENING MAMMOGRAM, ENCOUNTER FOR: ICD-10-CM

## 2024-12-11 PROCEDURE — 77063 BREAST TOMOSYNTHESIS BI: CPT | Performed by: RADIOLOGY

## 2024-12-11 PROCEDURE — 77067 SCR MAMMO BI INCL CAD: CPT

## 2024-12-11 PROCEDURE — 77063 BREAST TOMOSYNTHESIS BI: CPT

## 2024-12-11 PROCEDURE — 77067 SCR MAMMO BI INCL CAD: CPT | Performed by: RADIOLOGY

## 2025-01-22 DIAGNOSIS — I25.810 CORONARY ARTERY DISEASE INVOLVING AUTOLOGOUS ARTERY CORONARY BYPASS GRAFT WITHOUT ANGINA PECTORIS: ICD-10-CM

## 2025-01-22 DIAGNOSIS — F51.01 PRIMARY INSOMNIA: ICD-10-CM

## 2025-01-22 RX ORDER — TRAZODONE HYDROCHLORIDE 100 MG/1
100 TABLET ORAL NIGHTLY
Qty: 90 TABLET | Refills: 3 | Status: SHIPPED | OUTPATIENT
Start: 2025-01-22

## 2025-01-22 RX ORDER — BUDESONIDE AND FORMOTEROL FUMARATE DIHYDRATE 160; 4.5 UG/1; UG/1
2 AEROSOL RESPIRATORY (INHALATION) 2 TIMES DAILY
Qty: 3 EACH | Refills: 3 | Status: SHIPPED | OUTPATIENT
Start: 2025-01-22

## 2025-01-22 RX ORDER — PANTOPRAZOLE SODIUM 40 MG/1
40 TABLET, DELAYED RELEASE ORAL DAILY
Qty: 90 TABLET | Refills: 3 | Status: SHIPPED | OUTPATIENT
Start: 2025-01-22

## 2025-01-22 RX ORDER — METOPROLOL TARTRATE 25 MG/1
TABLET, FILM COATED ORAL
Qty: 180 TABLET | Refills: 3 | Status: SHIPPED | OUTPATIENT
Start: 2025-01-22

## 2025-01-22 NOTE — TELEPHONE ENCOUNTER
No osteoporosis diagnosis on problem list   Rx Refill Note  Requested Prescriptions     Pending Prescriptions Disp Refills    pantoprazole (PROTONIX) 40 MG EC tablet [Pharmacy Med Name: Pantoprazole Sodium Oral Tablet Delayed Release 40 MG] 90 tablet 3     Sig: TAKE 1 TABLET EVERY DAY     Signed Prescriptions Disp Refills    Symbicort 160-4.5 MCG/ACT inhaler 3 each 3     Sig: INHALE 2 PUFFS TWICE DAILY     Authorizing Provider: ASHLEE BUCIO     Ordering User: LEELEE DAVENPORT    traZODone (DESYREL) 100 MG tablet 90 tablet 3     Sig: TAKE 1 TABLET EVERY NIGHT     Authorizing Provider: ASHLEE BUCIO     Ordering User: LEELEE DAVENPORT    metoprolol tartrate (LOPRESSOR) 25 MG tablet 180 tablet 3     Sig: TAKE 1 TABLET TWICE DAILY     Authorizing Provider: ASHLEE BUCIO     Ordering User: LEELEE DAVENPORT      Last office visit with prescribing clinician: 10/31/2024   Last telemedicine visit with prescribing clinician: Visit date not found   Next office visit with prescribing clinician: 5/7/2025                         Would you like a call back once the refill request has been completed: [] Yes [] No    If the office needs to give you a call back, can they leave a voicemail: [] Yes [] No    Leelee Davenport MA  01/22/25, 10:45 EST

## 2025-02-14 ENCOUNTER — TELEPHONE (OUTPATIENT)
Dept: INTERNAL MEDICINE | Facility: CLINIC | Age: 75
End: 2025-02-14

## 2025-02-14 NOTE — TELEPHONE ENCOUNTER
Caller: Bradley Lovell    Relationship: Emergency Contact    Best call back number: [phone]    Who are you requesting to speak with (clinical staff, provider,  specific staff member): DR BUCIO       What was the call regarding: TRYING TO GET OXYGEN EQUIPMENT PICKED UP. PATIENT IS CURRENTLY IN [place], HAD AN ACCIDENT, WENT TO EMERGENCY ROOM. PATIENT WAS SENT HOME WITH OXYGEN. SPOUSE STATES OXYGEN IS NOT NEEDED, BUT THE COMPANY WILL NOT  EQUIPMENT WITHOUT A DOCTORS ORDER.   PLEASE ADVISE

## 2025-02-14 NOTE — TELEPHONE ENCOUNTER
Would you be okay with writing a prescription or the D/C of the oxygen so the tanks could be picked up?

## 2025-02-19 ENCOUNTER — APPOINTMENT (OUTPATIENT)
Dept: URBAN - METROPOLITAN AREA CLINIC 83 | Facility: CLINIC | Age: 75
Setting detail: DERMATOLOGY
End: 2025-02-19

## 2025-02-19 DIAGNOSIS — D49.2 NEOPLASM OF UNSPECIFIED BEHAVIOR OF BONE, SOFT TISSUE, AND SKIN: ICD-10-CM

## 2025-02-19 PROCEDURE — ? COUNSELING

## 2025-02-19 PROCEDURE — ? BIOPSY BY SHAVE METHOD

## 2025-02-19 PROCEDURE — 11102 TANGNTL BX SKIN SINGLE LES: CPT

## 2025-02-19 ASSESSMENT — LOCATION ZONE DERM: LOCATION ZONE: LEG

## 2025-02-19 ASSESSMENT — LOCATION DETAILED DESCRIPTION DERM: LOCATION DETAILED: LEFT PROXIMAL PRETIBIAL REGION

## 2025-02-19 ASSESSMENT — LOCATION SIMPLE DESCRIPTION DERM: LOCATION SIMPLE: LEFT PRETIBIAL REGION

## 2025-02-25 ENCOUNTER — APPOINTMENT (OUTPATIENT)
Dept: URBAN - METROPOLITAN AREA CLINIC 83 | Facility: CLINIC | Age: 75
Setting detail: DERMATOLOGY
End: 2025-02-25

## 2025-02-25 DIAGNOSIS — Z01.818 ENCOUNTER FOR OTHER PREPROCEDURAL EXAMINATION: ICD-10-CM

## 2025-02-25 PROCEDURE — ? ADDITIONAL NOTES

## 2025-03-28 ENCOUNTER — APPOINTMENT (OUTPATIENT)
Dept: URBAN - METROPOLITAN AREA CLINIC 83 | Facility: CLINIC | Age: 75
Setting detail: DERMATOLOGY
End: 2025-03-28

## 2025-03-28 PROBLEM — C44.729 SQUAMOUS CELL CARCINOMA OF SKIN OF LEFT LOWER LIMB, INCLUDING HIP: Status: ACTIVE | Noted: 2025-03-28

## 2025-03-28 PROCEDURE — ? REPAIR NOTE

## 2025-03-28 PROCEDURE — ? COUNSELING

## 2025-03-28 PROCEDURE — 13121 CMPLX RPR S/A/L 2.6-7.5 CM: CPT

## 2025-03-28 PROCEDURE — ? MOHS SURGERY

## 2025-03-28 PROCEDURE — 17313 MOHS 1 STAGE T/A/L: CPT

## 2025-03-28 NOTE — PROCEDURE: MOHS SURGERY
Mohs Case Number: [id number]
Date Of Previous Biopsy (Optional): 2-
Previous Accession (Optional): [id number]
Biopsy Photograph Reviewed: Yes
Referring Physician (Optional): Dr. Elliott
Consent Type: Consent 1 (Standard)
Eye Shield Used: No
Surgeon Performing Repair (Optional): TIKA Hernandez
Initial Size Of Lesion: 1.9
X Size Of Lesion In Cm (Optional): 1.4
Number Of Stages: 1
Primary Defect Length In Cm (Final Defect Size - Required For Flaps/Grafts): 2
Primary Defect Width In Cm (Final Defect Size - Required For Flaps/Grafts): 1.5
Primary Defect Depth In Cm (Optional But Required For Some Insurers): 0
Repair Type: Referred to mid-level for closure
Which Instrument Did You Use For Dermabrasion?: Wire Brush
Which Eyelid Repair Cpt Are You Using?: 97155
Oculoplastic Surgeon Procedure Text (A): After obtaining clear surgical margins the patient was sent to oculoplastics for surgical repair.  The patient understands they will receive post-surgical care and follow-up from the referring physician's office.
Otolaryngologist Procedure Text (A): After obtaining clear surgical margins the patient was sent to otolaryngology for surgical repair.  The patient understands they will receive post-surgical care and follow-up from the referring physician's office.
Plastic Surgeon Procedure Text (A): After obtaining clear surgical margins the patient was sent to plastics for surgical repair.  The patient understands they will receive post-surgical care and follow-up from the referring physician's office.
Which Mid-Level Are You Referring To?: A
Mid-Level (A): TIKA GARZA
Mid-Level Procedure Text (A): After obtaining clear surgical margins the patient was sent to a mid-level provider for surgical repair.  The patient understands they will receive post-surgical care and follow-up from the mid-level provider.
Provider Procedure Text (A): After obtaining clear surgical margins the defect was repaired by another provider.
Asc Procedure Text (A): After obtaining clear surgical margins the patient was sent to an ASC for surgical repair.  The patient understands they will receive post-surgical care and follow-up from the ASC physician.
Suturegard Retention Suture: 2-0 Nylon
Retention Suture Bite Size: 3 mm
Length To Time In Minutes Device Was In Place: 10
Undermining Type: Entire Wound
Debridement Text: The wound edges were debrided prior to proceeding with the closure to facilitate wound healing.
Helical Rim Text: The closure involved the helical rim.
Vermilion Border Text: The closure involved the vermilion border.
Nostril Rim Text: The closure involved the nostril rim.
Retention Suture Text: Retention sutures were placed to support the closure and prevent dehiscence.
Location Indication Override (Is Already Calculated Based On Selected Body Location): Area L
Area H Indication Text: Tumors in this location are included in Area H (eyelids, eyebrows, nose, lips, chin, ear, pre-auricular, post-auricular, temple, genitalia, hands, feet, ankles and areola).  Tissue conservation is critical in these anatomic locations.
Area M Indication Text: Tumors in this location are included in Area M (cheek, forehead, scalp, neck, jawline and pretibial skin).  Mohs surgery is indicated for tumors in these anatomic locations.
Area L Indication Text: Tumors in this location are included in Area L (trunk and extremities).  Mohs surgery is indicated for larger tumors, or tumors with aggressive histologic features, in these anatomic locations.
Tumor Debulked?: not debulked
Depth Of Tumor Invasion (For Histology): tumor not visualized
Perineural Invasion (For Histology - Be Specific If Possible): absent
Surgical Defect Length In Cm (Optional): 2.0
Special Stains Stage 1 - Results: Base On Clearance Noted Above
Stage 2: Additional Anesthesia Type: 1% lidocaine with epinephrine
Staging Info: By selecting yes to the question above you will include information on AJCC 8 tumor staging in your Mohs note. Information on tumor staging will be automatically added for SCCs on the head and neck. AJCC 8 includes tumor size, tumor depth, perineural involvement and bone invasion.
Tumor Depth: Less than 6mm from granular layer and no invasion beyond the subcutaneous fat
Was The Patient On Physician Recommended Anticoagulation Therapy?: Please Select the Appropriate Response
Medical Necessity Statement: Based on my medical judgement, Mohs surgery is the most appropriate treatment for this cancer compared to other treatments.
Alternatives Discussed Intro (Do Not Add Period): I discussed alternative treatments to Mohs surgery and specifically discussed the risks and benefits of
Consent 1/Introductory Paragraph: The rationale for Mohs was explained to the patient and consent was obtained. The risks, benefits and alternatives to therapy were discussed in detail. Specifically, the risks of infection, scarring, bleeding, prolonged wound healing, incomplete removal, allergy to anesthesia, nerve injury and recurrence were addressed. Prior to the procedure, the treatment site was clearly identified and confirmed by the patient. All components of Universal Protocol/PAUSE Rule completed.
Consent 2/Introductory Paragraph: Mohs surgery was explained to the patient and consent was obtained. The risks, benefits and alternatives to therapy were discussed in detail. Specifically, the risks of infection, scarring, bleeding, prolonged wound healing, incomplete removal, allergy to anesthesia, nerve injury and recurrence were addressed. Prior to the procedure, the treatment site was clearly identified and confirmed by the patient. All components of Universal Protocol/PAUSE Rule completed.
Consent 3/Introductory Paragraph: I gave the patient a chance to ask questions they had about the procedure.  Following this I explained the Mohs procedure and consent was obtained. The risks, benefits and alternatives to therapy were discussed in detail. Specifically, the risks of infection, scarring, bleeding, prolonged wound healing, incomplete removal, allergy to anesthesia, nerve injury and recurrence were addressed. Prior to the procedure, the treatment site was clearly identified and confirmed by the patient. All components of Universal Protocol/PAUSE Rule completed.
Consent (Temporal Branch)/Introductory Paragraph: The rationale for Mohs was explained to the patient and consent was obtained. The risks, benefits and alternatives to therapy were discussed in detail. Specifically, the risks of damage to the temporal branch of the facial nerve, infection, scarring, bleeding, prolonged wound healing, incomplete removal, allergy to anesthesia, and recurrence were addressed. Prior to the procedure, the treatment site was clearly identified and confirmed by the patient. All components of Universal Protocol/PAUSE Rule completed.
Consent (Marginal Mandibular)/Introductory Paragraph: The rationale for Mohs was explained to the patient and consent was obtained. The risks, benefits and alternatives to therapy were discussed in detail. Specifically, the risks of damage to the marginal mandibular branch of the facial nerve, infection, scarring, bleeding, prolonged wound healing, incomplete removal, allergy to anesthesia, and recurrence were addressed. Prior to the procedure, the treatment site was clearly identified and confirmed by the patient. All components of Universal Protocol/PAUSE Rule completed.
Consent (Spinal Accessory)/Introductory Paragraph: The rationale for Mohs was explained to the patient and consent was obtained. The risks, benefits and alternatives to therapy were discussed in detail. Specifically, the risks of damage to the spinal accessory nerve, infection, scarring, bleeding, prolonged wound healing, incomplete removal, allergy to anesthesia, and recurrence were addressed. Prior to the procedure, the treatment site was clearly identified and confirmed by the patient. All components of Universal Protocol/PAUSE Rule completed.
Consent (Near Eyelid Margin)/Introductory Paragraph: The rationale for Mohs was explained to the patient and consent was obtained. The risks, benefits and alternatives to therapy were discussed in detail. Specifically, the risks of ectropion or eyelid deformity, infection, scarring, bleeding, prolonged wound healing, incomplete removal, allergy to anesthesia, nerve injury and recurrence were addressed. Prior to the procedure, the treatment site was clearly identified and confirmed by the patient. All components of Universal Protocol/PAUSE Rule completed.
Consent (Ear)/Introductory Paragraph: The rationale for Mohs was explained to the patient and consent was obtained. The risks, benefits and alternatives to therapy were discussed in detail. Specifically, the risks of ear deformity, infection, scarring, bleeding, prolonged wound healing, incomplete removal, allergy to anesthesia, nerve injury and recurrence were addressed. Prior to the procedure, the treatment site was clearly identified and confirmed by the patient. All components of Universal Protocol/PAUSE Rule completed.
Consent (Nose)/Introductory Paragraph: The rationale for Mohs was explained to the patient and consent was obtained. The risks, benefits and alternatives to therapy were discussed in detail. Specifically, the risks of nasal deformity, changes in the flow of air through the nose, infection, scarring, bleeding, prolonged wound healing, incomplete removal, allergy to anesthesia, nerve injury and recurrence were addressed. Prior to the procedure, the treatment site was clearly identified and confirmed by the patient. All components of Universal Protocol/PAUSE Rule completed.
Consent (Lip)/Introductory Paragraph: The rationale for Mohs was explained to the patient and consent was obtained. The risks, benefits and alternatives to therapy were discussed in detail. Specifically, the risks of lip deformity, changes in the oral aperture, infection, scarring, bleeding, prolonged wound healing, incomplete removal, allergy to anesthesia, nerve injury and recurrence were addressed. Prior to the procedure, the treatment site was clearly identified and confirmed by the patient. All components of Universal Protocol/PAUSE Rule completed.
Consent (Scalp)/Introductory Paragraph: The rationale for Mohs was explained to the patient and consent was obtained. The risks, benefits and alternatives to therapy were discussed in detail. Specifically, the risks of changes in hair growth pattern secondary to repair, infection, scarring, bleeding, prolonged wound healing, incomplete removal, allergy to anesthesia, nerve injury and recurrence were addressed. Prior to the procedure, the treatment site was clearly identified and confirmed by the patient. All components of Universal Protocol/PAUSE Rule completed.
Detail Level: Detailed
Postop Diagnosis: same
Anesthesia Volume In Cc: 5
Hemostasis: Electrocautery
Estimated Blood Loss (Cc): minimal
Repair Anesthesia Method: local infiltration
Anesthesia Volume In Cc: 6
Brow Lift Text: A midfrontal incision was made medially to the defect to allow access to the tissues just superior to the left eyebrow. Following careful dissection inferiorly in a supraperiosteal plane to the level of the left eyebrow, several 3-0 monocryl sutures were used to resuspend the eyebrow orbicularis oculi muscular unit to the superior frontal bone periosteum. This resulted in an appropriate reapproximation of static eyebrow symmetry and correction of the left brow ptosis.
Epidermal Closure: simple interrupted
Suturegard Intro: Intraoperative tissue expansion was performed, utilizing the SUTUREGARD device, in order to reduce wound tension.
Suturegard Body: The suture ends were repeatedly re-tightened and re-clamped to achieve the desired tissue expansion.
Hemigard Intro: Due to skin fragility and wound tension, it was decided to use HEMIGARD adhesive retention suture devices to permit a linear closure. The skin was cleaned and dried for a 6cm distance away from the wound. Excessive hair, if present, was removed to allow for adhesion.
Hemigard Postcare Instructions: The HEMIGARD strips are to remain completely dry for at least 5-7 days.
Donor Site Anesthesia Type: same as repair anesthesia
Graft Donor Site Bandage (Optional-Leave Blank If You Don't Want In Note): Steri-strips and a pressure bandage were applied to the donor site.
Closure 2 Information: This tab is for additional flaps and grafts, including complex repair and grafts and complex repair and flaps. You can also specify a different location for the additional defect, if the location is the same you do not need to select a new one. We will insert the automated text for the repair you select below just as we do for solitary flaps and grafts. Please note that at this time if you select a location with a different insurance zone you will need to override the ICD10 and CPT if appropriate.
Closure 3 Information: This tab is for additional flaps and grafts above and beyond our usual structured repairs.  Please note if you enter information here it will not currently bill and you will need to add the billing information manually.
Wound Care: Aquaphor
Dressing: pressure dressing
Wound Care (No Sutures): Petrolatum
Dressing (No Sutures): dry sterile dressing
Unna Boot Text: An Unna boot was placed to help immobilize the limb and facilitate more rapid healing.
Post-Care Instructions: I reviewed with the patient in detail post-care instructions. Patient is not to engage in any heavy lifting, exercise, or swimming for the next 14 days. Should the patient develop any fevers, chills, bleeding, severe pain patient will contact the office immediately.
Pain Refusal Text: I offered to prescribe pain medication but the patient refused to take this medication.
Mauc Instructions: By selecting yes to the question below the MAUC number will be added into the note.  This will be calculated automatically based on the diagnosis chosen, the size entered, the body zone selected (H,M,L) and the specific indications you chose. You will also have the option to override the Mohs AUC if you disagree with the automatically calculated number and this option is found in the Case Summary tab.
Where Do You Want The Question To Include Opioid Counseling Located?: Case Summary Tab
Eye Protection Verbiage: Before proceeding with the stage, a plastic scleral shield was inserted. The globe was anesthetized with a few drops of 1% lidocaine with 1:100,000 epinephrine. Then, an appropriate sized scleral shield was chosen and coated with lacrilube ointment. The shield was gently inserted and left in place for the duration of each stage. After the stage was completed, the shield was gently removed.
Mohs Method Verbiage: An incision at a 45 degree angle following the standard Mohs approach was done and the specimen was harvested as a microscopic controlled layer.
Surgeon/Pathologist Verbiage (Will Incorporate Name Of Surgeon From Intro If Not Blank): operated in two distinct and integrated capacities as the surgeon and pathologist.
Mohs Histo Method Verbiage: Each section was then chromacoded and processed in the Mohs lab using the Mohs protocol and submitted for frozen section.
Subsequent Stages Histo Method Verbiage: Using a similar technique to that described above, a thin layer of tissue was removed from all areas where tumor was visible on the previous stage.  The tissue was again oriented, mapped, dyed, and processed as above.
Mohs Rapid Report Verbiage: The area of clinically evident tumor was marked with skin marking ink and appropriately hatched.  The initial incision was made following the Mohs approach through the skin.  The specimen was taken to the lab, divided into the necessary number of pieces, chromacoded and processed according to the Mohs protocol.  This was repeated in successive stages until a tumor free defect was achieved.
Complex Repair Preamble Text (Leave Blank If You Do Not Want): Extensive wide undermining was performed.
Intermediate Repair Preamble Text (Leave Blank If You Do Not Want): Undermining was performed with blunt dissection.
Graft Cartilage Fenestration Text: The cartilage was fenestrated with a 2mm punch biopsy to help facilitate graft survival and healing.
Non-Graft Cartilage Fenestration Text: The cartilage was fenestrated with a 2mm punch biopsy to help facilitate healing.
Secondary Intention Text (Leave Blank If You Do Not Want): The defect will heal with secondary intention.
No Repair - Repaired With Adjacent Surgical Defect Text (Leave Blank If You Do Not Want): After obtaining clear surgical margins the defect was repaired concurrently with another surgical defect which was in close approximation.
Adjacent Tissue Transfer Text: The defect edges were debeveled with a #15 scalpel blade.  Given the location of the defect and the proximity to free margins an adjacent tissue transfer was deemed most appropriate.  Using a sterile surgical marker, an appropriate flap was drawn incorporating the defect and placing the expected incisions within the relaxed skin tension lines where possible.    The area thus outlined was incised deep to adipose tissue with a #15 scalpel blade.  The skin margins were undermined to an appropriate distance in all directions utilizing iris scissors.
Advancement Flap (Single) Text: The defect edges were debeveled with a #15 scalpel blade.  Given the location of the defect and the proximity to free margins a single advancement flap was deemed most appropriate.  Using a sterile surgical marker, an appropriate advancement flap was drawn incorporating the defect and placing the expected incisions within the relaxed skin tension lines where possible.    The area thus outlined was incised deep to adipose tissue with a #15 scalpel blade.  The skin margins were undermined to an appropriate distance in all directions utilizing iris scissors.
Advancement Flap (Double) Text: The defect edges were debeveled with a #15 scalpel blade.  Given the location of the defect and the proximity to free margins a double advancement flap was deemed most appropriate.  Using a sterile surgical marker, the appropriate advancement flaps were drawn incorporating the defect and placing the expected incisions within the relaxed skin tension lines where possible.    The area thus outlined was incised deep to adipose tissue with a #15 scalpel blade.  The skin margins were undermined to an appropriate distance in all directions utilizing iris scissors.
Advancement-Rotation Flap Text: The defect edges were debeveled with a #15 scalpel blade.  Given the location of the defect, shape of the defect and the proximity to free margins an advancement-rotation flap was deemed most appropriate.  Using a sterile surgical marker, an appropriate flap was drawn incorporating the defect and placing the expected incisions within the relaxed skin tension lines where possible. The area thus outlined was incised deep to adipose tissue with a #15 scalpel blade.  The skin margins were undermined to an appropriate distance in all directions utilizing iris scissors.
Alar Island Pedicle Flap Text: The defect edges were debeveled with a #15 scalpel blade.  Given the location of the defect, shape of the defect and the proximity to the alar rim an island pedicle advancement flap was deemed most appropriate.  Using a sterile surgical marker, an appropriate advancement flap was drawn incorporating the defect, outlining the appropriate donor tissue and placing the expected incisions within the nasal ala running parallel to the alar rim. The area thus outlined was incised with a #15 scalpel blade.  The skin margins were undermined minimally to an appropriate distance in all directions around the primary defect and laterally outward around the island pedicle utilizing iris scissors.  There was minimal undermining beneath the pedicle flap.
A-T Advancement Flap Text: The defect edges were debeveled with a #15 scalpel blade.  Given the location of the defect, shape of the defect and the proximity to free margins an A-T advancement flap was deemed most appropriate.  Using a sterile surgical marker, an appropriate advancement flap was drawn incorporating the defect and placing the expected incisions within the relaxed skin tension lines where possible.    The area thus outlined was incised deep to adipose tissue with a #15 scalpel blade.  The skin margins were undermined to an appropriate distance in all directions utilizing iris scissors.
Banner Transposition Flap Text: The defect edges were debeveled with a #15 scalpel blade.  Given the location of the defect and the proximity to free margins a Banner transposition flap was deemed most appropriate.  Using a sterile surgical marker, an appropriate flap drawn around the defect. The area thus outlined was incised deep to adipose tissue with a #15 scalpel blade.  The skin margins were undermined to an appropriate distance in all directions utilizing iris scissors.
Bilateral Helical Rim Advancement Flap Text: The defect edges were debeveled with a #15 blade scalpel.  Given the location of the defect and the proximity to free margins (helical rim) a bilateral helical rim advancement flap was deemed most appropriate.  Using a sterile surgical marker, the appropriate advancement flaps were drawn incorporating the defect and placing the expected incisions between the helical rim and antihelix where possible.  The area thus outlined was incised through and through with a #15 scalpel blade.  With a skin hook and iris scissors, the flaps were gently and sharply undermined and freed up.
Bilateral Rotation Flap Text: The defect edges were debeveled with a #15 scalpel blade. Given the location of the defect, shape of the defect and the proximity to free margins a bilateral rotation flap was deemed most appropriate. Using a sterile surgical marker, an appropriate rotation flap was drawn incorporating the defect and placing the expected incisions within the relaxed skin tension lines where possible. The area thus outlined was incised deep to adipose tissue with a #15 scalpel blade. The skin margins were undermined to an appropriate distance in all directions utilizing iris scissors. Following this, the designed flap was carried over into the primary defect and sutured into place.
Bilobed Flap Text: The defect edges were debeveled with a #15 scalpel blade.  Given the location of the defect and the proximity to free margins a bilobe flap was deemed most appropriate.  Using a sterile surgical marker, an appropriate bilobe flap drawn around the defect.    The area thus outlined was incised deep to adipose tissue with a #15 scalpel blade.  The skin margins were undermined to an appropriate distance in all directions utilizing iris scissors.
Bilobed Transposition Flap Text: The defect edges were debeveled with a #15 scalpel blade.  Given the location of the defect and the proximity to free margins a bilobed transposition flap was deemed most appropriate.  Using a sterile surgical marker, an appropriate bilobe flap drawn around the defect.    The area thus outlined was incised deep to adipose tissue with a #15 scalpel blade.  The skin margins were undermined to an appropriate distance in all directions utilizing iris scissors.
Bi-Rhombic Flap Text: The defect edges were debeveled with a #15 scalpel blade.  Given the location of the defect and the proximity to free margins a bi-rhombic flap was deemed most appropriate.  Using a sterile surgical marker, an appropriate rhombic flap was drawn incorporating the defect. The area thus outlined was incised deep to adipose tissue with a #15 scalpel blade.  The skin margins were undermined to an appropriate distance in all directions utilizing iris scissors.
Burow's Advancement Flap Text: The defect edges were debeveled with a #15 scalpel blade.  Given the location of the defect and the proximity to free margins a Burow's advancement flap was deemed most appropriate.  Using a sterile surgical marker, the appropriate advancement flap was drawn incorporating the defect and placing the expected incisions within the relaxed skin tension lines where possible.    The area thus outlined was incised deep to adipose tissue with a #15 scalpel blade.  The skin margins were undermined to an appropriate distance in all directions utilizing iris scissors.
Chonodrocutaneous Helical Advancement Flap Text: The defect edges were debeveled with a #15 scalpel blade.  Given the location of the defect and the proximity to free margins a chondrocutaneous helical advancement flap was deemed most appropriate.  Using a sterile surgical marker, the appropriate advancement flap was drawn incorporating the defect and placing the expected incisions within the relaxed skin tension lines where possible.    The area thus outlined was incised deep to adipose tissue with a #15 scalpel blade.  The skin margins were undermined to an appropriate distance in all directions utilizing iris scissors.
Crescentic Advancement Flap Text: The defect edges were debeveled with a #15 scalpel blade.  Given the location of the defect and the proximity to free margins a crescentic advancement flap was deemed most appropriate.  Using a sterile surgical marker, the appropriate advancement flap was drawn incorporating the defect and placing the expected incisions within the relaxed skin tension lines where possible.    The area thus outlined was incised deep to adipose tissue with a #15 scalpel blade.  The skin margins were undermined to an appropriate distance in all directions utilizing iris scissors.
Dorsal Nasal Flap Text: The defect edges were debeveled with a #15 scalpel blade.  Given the location of the defect and the proximity to free margins a dorsal nasal flap was deemed most appropriate.  Using a sterile surgical marker, an appropriate dorsal nasal flap was drawn around the defect.    The area thus outlined was incised deep to adipose tissue with a #15 scalpel blade.  The skin margins were undermined to an appropriate distance in all directions utilizing iris scissors.
Double Island Pedicle Flap Text: The defect edges were debeveled with a #15 scalpel blade.  Given the location of the defect, shape of the defect and the proximity to free margins a double island pedicle advancement flap was deemed most appropriate.  Using a sterile surgical marker, an appropriate advancement flap was drawn incorporating the defect, outlining the appropriate donor tissue and placing the expected incisions within the relaxed skin tension lines where possible.    The area thus outlined was incised deep to adipose tissue with a #15 scalpel blade.  The skin margins were undermined to an appropriate distance in all directions around the primary defect and laterally outward around the island pedicle utilizing iris scissors.  There was minimal undermining beneath the pedicle flap.
Double O-Z Flap Text: The defect edges were debeveled with a #15 scalpel blade.  Given the location of the defect, shape of the defect and the proximity to free margins a Double O-Z flap was deemed most appropriate.  Using a sterile surgical marker, an appropriate transposition flap was drawn incorporating the defect and placing the expected incisions within the relaxed skin tension lines where possible. The area thus outlined was incised deep to adipose tissue with a #15 scalpel blade.  The skin margins were undermined to an appropriate distance in all directions utilizing iris scissors.
Double O-Z Plasty Text: The defect edges were debeveled with a #15 scalpel blade.  Given the location of the defect, shape of the defect and the proximity to free margins a Double O-Z plasty (double transposition flap) was deemed most appropriate.  Using a sterile surgical marker, the appropriate transposition flaps were drawn incorporating the defect and placing the expected incisions within the relaxed skin tension lines where possible. The area thus outlined was incised deep to adipose tissue with a #15 scalpel blade.  The skin margins were undermined to an appropriate distance in all directions utilizing iris scissors.  Hemostasis was achieved with electrocautery.  The flaps were then transposed into place, one clockwise and the other counterclockwise, and anchored with interrupted buried subcutaneous sutures.
Double Z Plasty Text: The lesion was extirpated to the level of the fat with a #15 scalpel blade. Given the location of the defect, shape of the defect and the proximity to free margins a double Z-plasty was deemed most appropriate for repair. Using a sterile surgical marker, the appropriate transposition arms of the double Z-plasty were drawn incorporating the defect and placing the expected incisions within the relaxed skin tension lines where possible. The area thus outlined was incised deep to adipose tissue with a #15 scalpel blade. The skin margins were undermined to an appropriate distance in all directions utilizing iris scissors. The opposing transposition arms were then transposed and carried over into place in opposite direction and anchored with interrupted buried subcutaneous sutures.
Ear Star Wedge Flap Text: The defect edges were debeveled with a #15 blade scalpel.  Given the location of the defect and the proximity to free margins (helical rim) an ear star wedge flap was deemed most appropriate.  Using a sterile surgical marker, the appropriate flap was drawn incorporating the defect and placing the expected incisions between the helical rim and antihelix where possible.  The area thus outlined was incised through and through with a #15 scalpel blade.
Flip-Flop Flap Text: The defect edges were debeveled with a #15 blade scalpel.  Given the location of the defect and the proximity to free margins a flip-flop flap was deemed most appropriate. Using a sterile surgical marker, the appropriate flap was drawn incorporating the defect and placing the expected incisions between the helical rim and antihelix where possible.  The area thus outlined was incised through and through with a #15 scalpel blade. Following this, the designed flap was carried over into the primary defect and sutured into place.
Hatchet Flap Text: The defect edges were debeveled with a #15 scalpel blade.  Given the location of the defect, shape of the defect and the proximity to free margins a hatchet flap was deemed most appropriate.  Using a sterile surgical marker, an appropriate hatchet flap was drawn incorporating the defect and placing the expected incisions within the relaxed skin tension lines where possible.    The area thus outlined was incised deep to adipose tissue with a #15 scalpel blade.  The skin margins were undermined to an appropriate distance in all directions utilizing iris scissors.
Helical Rim Advancement Flap Text: The defect edges were debeveled with a #15 blade scalpel.  Given the location of the defect and the proximity to free margins (helical rim) a double helical rim advancement flap was deemed most appropriate.  Using a sterile surgical marker, the appropriate advancement flaps were drawn incorporating the defect and placing the expected incisions between the helical rim and antihelix where possible.  The area thus outlined was incised through and through with a #15 scalpel blade.  With a skin hook and iris scissors, the flaps were gently and sharply undermined and freed up.
H Plasty Text: Given the location of the defect, shape of the defect and the proximity to free margins a H-plasty was deemed most appropriate for repair.  Using a sterile surgical marker, the appropriate advancement arms of the H-plasty were drawn incorporating the defect and placing the expected incisions within the relaxed skin tension lines where possible. The area thus outlined was incised deep to adipose tissue with a #15 scalpel blade. The skin margins were undermined to an appropriate distance in all directions utilizing iris scissors.  The opposing advancement arms were then advanced into place in opposite direction and anchored with interrupted buried subcutaneous sutures.
Island Pedicle Flap Text: The defect edges were debeveled with a #15 scalpel blade.  Given the location of the defect, shape of the defect and the proximity to free margins an island pedicle advancement flap was deemed most appropriate.  Using a sterile surgical marker, an appropriate advancement flap was drawn incorporating the defect, outlining the appropriate donor tissue and placing the expected incisions within the relaxed skin tension lines where possible.    The area thus outlined was incised deep to adipose tissue with a #15 scalpel blade.  The skin margins were undermined to an appropriate distance in all directions around the primary defect and laterally outward around the island pedicle utilizing iris scissors.  There was minimal undermining beneath the pedicle flap.
Island Pedicle Flap With Canthal Suspension Text: The defect edges were debeveled with a #15 scalpel blade.  Given the location of the defect, shape of the defect and the proximity to free margins an island pedicle advancement flap was deemed most appropriate.  Using a sterile surgical marker, an appropriate advancement flap was drawn incorporating the defect, outlining the appropriate donor tissue and placing the expected incisions within the relaxed skin tension lines where possible. The area thus outlined was incised deep to adipose tissue with a #15 scalpel blade.  The skin margins were undermined to an appropriate distance in all directions around the primary defect and laterally outward around the island pedicle utilizing iris scissors.  There was minimal undermining beneath the pedicle flap. A suspension suture was placed in the canthal tendon to prevent tension and prevent ectropion.
Island Pedicle Flap-Requiring Vessel Identification Text: The defect edges were debeveled with a #15 scalpel blade.  Given the location of the defect, shape of the defect and the proximity to free margins an island pedicle advancement flap was deemed most appropriate.  Using a sterile surgical marker, an appropriate advancement flap was drawn, based on the axial vessel mentioned above, incorporating the defect, outlining the appropriate donor tissue and placing the expected incisions within the relaxed skin tension lines where possible.    The area thus outlined was incised deep to adipose tissue with a #15 scalpel blade.  The skin margins were undermined to an appropriate distance in all directions around the primary defect and laterally outward around the island pedicle utilizing iris scissors.  There was minimal undermining beneath the pedicle flap.
Keystone Flap Text: The defect edges were debeveled with a #15 scalpel blade.  Given the location of the defect, shape of the defect a keystone flap was deemed most appropriate.  Using a sterile surgical marker, an appropriate keystone flap was drawn incorporating the defect, outlining the appropriate donor tissue and placing the expected incisions within the relaxed skin tension lines where possible. The area thus outlined was incised deep to adipose tissue with a #15 scalpel blade.  The skin margins were undermined to an appropriate distance in all directions around the primary defect and laterally outward around the flap utilizing iris scissors.
Melolabial Transposition Flap Text: The defect edges were debeveled with a #15 scalpel blade.  Given the location of the defect and the proximity to free margins a melolabial flap was deemed most appropriate.  Using a sterile surgical marker, an appropriate melolabial transposition flap was drawn incorporating the defect.    The area thus outlined was incised deep to adipose tissue with a #15 scalpel blade.  The skin margins were undermined to an appropriate distance in all directions utilizing iris scissors.
Mercedes Flap Text: The defect edges were debeveled with a #15 scalpel blade.  Given the location of the defect, shape of the defect and the proximity to free margins a Mercedes flap was deemed most appropriate.  Using a sterile surgical marker, an appropriate advancement flap was drawn incorporating the defect and placing the expected incisions within the relaxed skin tension lines where possible. The area thus outlined was incised deep to adipose tissue with a #15 scalpel blade.  The skin margins were undermined to an appropriate distance in all directions utilizing iris scissors.
Modified Advancement Flap Text: The defect edges were debeveled with a #15 scalpel blade.  Given the location of the defect, shape of the defect and the proximity to free margins a modified advancement flap was deemed most appropriate.  Using a sterile surgical marker, an appropriate advancement flap was drawn incorporating the defect and placing the expected incisions within the relaxed skin tension lines where possible.    The area thus outlined was incised deep to adipose tissue with a #15 scalpel blade.  The skin margins were undermined to an appropriate distance in all directions utilizing iris scissors.
Mucosal Advancement Flap Text: Given the location of the defect, shape of the defect and the proximity to free margins a mucosal advancement flap was deemed most appropriate. Incisions were made with a 15 blade scalpel in the appropriate fashion along the cutaneous vermilion border and the mucosal lip. The remaining actinically damaged mucosal tissue was excised.  The mucosal advancement flap was then elevated to the gingival sulcus with care taken to preserve the neurovascular structures and advanced into the primary defect. Care was taken to ensure that precise realignment of the vermilion border was achieved.
Muscle Hinge Flap Text: The defect edges were debeveled with a #15 scalpel blade.  Given the size, depth and location of the defect and the proximity to free margins a muscle hinge flap was deemed most appropriate.  Using a sterile surgical marker, an appropriate hinge flap was drawn incorporating the defect. The area thus outlined was incised with a #15 scalpel blade.  The skin margins were undermined to an appropriate distance in all directions utilizing iris scissors.
Mustarde Flap Text: The defect edges were debeveled with a #15 scalpel blade.  Given the size, depth and location of the defect and the proximity to free margins a Mustarde flap was deemed most appropriate.  Using a sterile surgical marker, an appropriate flap was drawn incorporating the defect. The area thus outlined was incised with a #15 scalpel blade.  The skin margins were undermined to an appropriate distance in all directions utilizing iris scissors.
Nasal Turnover Hinge Flap Text: The defect edges were debeveled with a #15 scalpel blade.  Given the size, depth, location of the defect and the defect being full thickness a nasal turnover hinge flap was deemed most appropriate.  Using a sterile surgical marker, an appropriate hinge flap was drawn incorporating the defect. The area thus outlined was incised with a #15 scalpel blade. The flap was designed to recreate the nasal mucosal lining and the alar rim. The skin margins were undermined to an appropriate distance in all directions utilizing iris scissors.
Nasalis-Muscle-Based Myocutaneous Island Pedicle Flap Text: Using a #15 blade, an incision was made around the donor flap to the level of the nasalis muscle. Wide lateral undermining was then performed in both the subcutaneous plane above the nasalis muscle, and in a submuscular plane just above periosteum. This allowed the formation of a free nasalis muscle axial pedicle (based on the angular artery) which was still attached to the actual cutaneous flap, increasing its mobility and vascular viability. Hemostasis was obtained with pinpoint electrocoagulation. The flap was mobilized into position and the pivotal anchor points positioned and stabilized with buried interrupted sutures. Subcutaneous and dermal tissues were closed in a multilayered fashion with sutures. Tissue redundancies were excised, and the epidermal edges were apposed without significant tension and sutured with sutures.
Nasalis Myocutaneous Flap Text: Using a #15 blade, an incision was made around the donor flap to the level of the nasalis muscle. Wide lateral undermining was then performed in both the subcutaneous plane above the nasalis muscle, and in a submuscular plane just above periosteum. This allowed the formation of a free nasalis muscle axial pedicle which was still attached to the actual cutaneous flap, increasing its mobility and vascular viability. Hemostasis was obtained with pinpoint electrocoagulation. The flap was mobilized into position and the pivotal anchor points positioned and stabilized with buried interrupted sutures. Subcutaneous and dermal tissues were closed in a multilayered fashion with sutures. Tissue redundancies were excised, and the epidermal edges were apposed without significant tension and sutured with sutures.
Nasolabial Transposition Flap Text: The defect edges were debeveled with a #15 scalpel blade.  Given the size, depth and location of the defect and the proximity to free margins a nasolabial transposition flap was deemed most appropriate. Using a sterile surgical marker, an appropriate flap was drawn incorporating the defect. The area thus outlined was incised with a #15 scalpel blade. The skin margins were undermined to an appropriate distance in all directions utilizing iris scissors. Following this, the designed flap was carried into the primary defect and sutured into place.
Orbicularis Oris Muscle Flap Text: The defect edges were debeveled with a #15 scalpel blade.  Given that the defect affected the competency of the oral sphincter an orbicularis oris muscle flap was deemed most appropriate to restore this competency and normal muscle function.  Using a sterile surgical marker, an appropriate flap was drawn incorporating the defect. The area thus outlined was incised with a #15 scalpel blade.
O-T Advancement Flap Text: The defect edges were debeveled with a #15 scalpel blade.  Given the location of the defect, shape of the defect and the proximity to free margins an O-T advancement flap was deemed most appropriate.  Using a sterile surgical marker, an appropriate advancement flap was drawn incorporating the defect and placing the expected incisions within the relaxed skin tension lines where possible.    The area thus outlined was incised deep to adipose tissue with a #15 scalpel blade.  The skin margins were undermined to an appropriate distance in all directions utilizing iris scissors.
O-T Plasty Text: The defect edges were debeveled with a #15 scalpel blade.  Given the location of the defect, shape of the defect and the proximity to free margins an O-T plasty was deemed most appropriate.  Using a sterile surgical marker, an appropriate O-T plasty was drawn incorporating the defect and placing the expected incisions within the relaxed skin tension lines where possible.    The area thus outlined was incised deep to adipose tissue with a #15 scalpel blade.  The skin margins were undermined to an appropriate distance in all directions utilizing iris scissors.
O-L Flap Text: The defect edges were debeveled with a #15 scalpel blade.  Given the location of the defect, shape of the defect and the proximity to free margins an O-L flap was deemed most appropriate.  Using a sterile surgical marker, an appropriate advancement flap was drawn incorporating the defect and placing the expected incisions within the relaxed skin tension lines where possible.    The area thus outlined was incised deep to adipose tissue with a #15 scalpel blade.  The skin margins were undermined to an appropriate distance in all directions utilizing iris scissors.
O-Z Flap Text: The defect edges were debeveled with a #15 scalpel blade.  Given the location of the defect, shape of the defect and the proximity to free margins an O-Z flap was deemed most appropriate.  Using a sterile surgical marker, an appropriate transposition flap was drawn incorporating the defect and placing the expected incisions within the relaxed skin tension lines where possible. The area thus outlined was incised deep to adipose tissue with a #15 scalpel blade.  The skin margins were undermined to an appropriate distance in all directions utilizing iris scissors.
O-Z Plasty Text: The defect edges were debeveled with a #15 scalpel blade.  Given the location of the defect, shape of the defect and the proximity to free margins an O-Z plasty (double transposition flap) was deemed most appropriate.  Using a sterile surgical marker, the appropriate transposition flaps were drawn incorporating the defect and placing the expected incisions within the relaxed skin tension lines where possible.    The area thus outlined was incised deep to adipose tissue with a #15 scalpel blade.  The skin margins were undermined to an appropriate distance in all directions utilizing iris scissors.  Hemostasis was achieved with electrocautery.  The flaps were then transposed into place, one clockwise and the other counterclockwise, and anchored with interrupted buried subcutaneous sutures.
Peng Advancement Flap Text: The defect edges were debeveled with a #15 scalpel blade.  Given the location of the defect, shape of the defect and the proximity to free margins a Peng advancement flap was deemed most appropriate.  Using a sterile surgical marker, an appropriate advancement flap was drawn incorporating the defect and placing the expected incisions within the relaxed skin tension lines where possible. The area thus outlined was incised deep to adipose tissue with a #15 scalpel blade.  The skin margins were undermined to an appropriate distance in all directions utilizing iris scissors.
Rectangular Flap Text: The defect edges were debeveled with a #15 scalpel blade. Given the location of the defect and the proximity to free margins a rectangular flap was deemed most appropriate. Using a sterile surgical marker, an appropriate rectangular flap was drawn incorporating the defect. The area thus outlined was incised deep to adipose tissue with a #15 scalpel blade. The skin margins were undermined to an appropriate distance in all directions utilizing iris scissors. Following this, the designed flap was carried over into the primary defect and sutured into place.
Rhombic Flap Text: The defect edges were debeveled with a #15 scalpel blade.  Given the location of the defect and the proximity to free margins a rhombic flap was deemed most appropriate.  Using a sterile surgical marker, an appropriate rhombic flap was drawn incorporating the defect.    The area thus outlined was incised deep to adipose tissue with a #15 scalpel blade.  The skin margins were undermined to an appropriate distance in all directions utilizing iris scissors.
Rhomboid Transposition Flap Text: The defect edges were debeveled with a #15 scalpel blade.  Given the location of the defect and the proximity to free margins a rhomboid transposition flap was deemed most appropriate.  Using a sterile surgical marker, an appropriate rhomboid flap was drawn incorporating the defect.    The area thus outlined was incised deep to adipose tissue with a #15 scalpel blade.  The skin margins were undermined to an appropriate distance in all directions utilizing iris scissors.
Rotation Flap Text: The defect edges were debeveled with a #15 scalpel blade.  Given the location of the defect, shape of the defect and the proximity to free margins a rotation flap was deemed most appropriate.  Using a sterile surgical marker, an appropriate rotation flap was drawn incorporating the defect and placing the expected incisions within the relaxed skin tension lines where possible.    The area thus outlined was incised deep to adipose tissue with a #15 scalpel blade.  The skin margins were undermined to an appropriate distance in all directions utilizing iris scissors.
Spiral Flap Text: The defect edges were debeveled with a #15 scalpel blade.  Given the location of the defect, shape of the defect and the proximity to free margins a spiral flap was deemed most appropriate.  Using a sterile surgical marker, an appropriate rotation flap was drawn incorporating the defect and placing the expected incisions within the relaxed skin tension lines where possible. The area thus outlined was incised deep to adipose tissue with a #15 scalpel blade.  The skin margins were undermined to an appropriate distance in all directions utilizing iris scissors.
Staged Advancement Flap Text: The defect edges were debeveled with a #15 scalpel blade.  Given the location of the defect, shape of the defect and the proximity to free margins a staged advancement flap was deemed most appropriate.  Using a sterile surgical marker, an appropriate advancement flap was drawn incorporating the defect and placing the expected incisions within the relaxed skin tension lines where possible. The area thus outlined was incised deep to adipose tissue with a #15 scalpel blade.  The skin margins were undermined to an appropriate distance in all directions utilizing iris scissors.
Star Wedge Flap Text: The defect edges were debeveled with a #15 scalpel blade.  Given the location of the defect, shape of the defect and the proximity to free margins a star wedge flap was deemed most appropriate.  Using a sterile surgical marker, an appropriate rotation flap was drawn incorporating the defect and placing the expected incisions within the relaxed skin tension lines where possible. The area thus outlined was incised deep to adipose tissue with a #15 scalpel blade.  The skin margins were undermined to an appropriate distance in all directions utilizing iris scissors.
Transposition Flap Text: The defect edges were debeveled with a #15 scalpel blade.  Given the location of the defect and the proximity to free margins a transposition flap was deemed most appropriate.  Using a sterile surgical marker, an appropriate transposition flap was drawn incorporating the defect.    The area thus outlined was incised deep to adipose tissue with a #15 scalpel blade.  The skin margins were undermined to an appropriate distance in all directions utilizing iris scissors.
Trilobed Flap Text: The defect edges were debeveled with a #15 scalpel blade.  Given the location of the defect and the proximity to free margins a trilobed flap was deemed most appropriate.  Using a sterile surgical marker, an appropriate trilobed flap drawn around the defect.    The area thus outlined was incised deep to adipose tissue with a #15 scalpel blade.  The skin margins were undermined to an appropriate distance in all directions utilizing iris scissors.
V-Y Flap Text: The defect edges were debeveled with a #15 scalpel blade.  Given the location of the defect, shape of the defect and the proximity to free margins a V-Y flap was deemed most appropriate.  Using a sterile surgical marker, an appropriate advancement flap was drawn incorporating the defect and placing the expected incisions within the relaxed skin tension lines where possible.    The area thus outlined was incised deep to adipose tissue with a #15 scalpel blade.  The skin margins were undermined to an appropriate distance in all directions utilizing iris scissors.
V-Y Plasty Text: The defect edges were debeveled with a #15 scalpel blade.  Given the location of the defect, shape of the defect and the proximity to free margins an V-Y advancement flap was deemed most appropriate.  Using a sterile surgical marker, an appropriate advancement flap was drawn incorporating the defect and placing the expected incisions within the relaxed skin tension lines where possible.    The area thus outlined was incised deep to adipose tissue with a #15 scalpel blade.  The skin margins were undermined to an appropriate distance in all directions utilizing iris scissors.
W Plasty Text: The lesion was extirpated to the level of the fat with a #15 scalpel blade.  Given the location of the defect, shape of the defect and the proximity to free margins a W-plasty was deemed most appropriate for repair.  Using a sterile surgical marker, the appropriate transposition arms of the W-plasty were drawn incorporating the defect and placing the expected incisions within the relaxed skin tension lines where possible.    The area thus outlined was incised deep to adipose tissue with a #15 scalpel blade.  The skin margins were undermined to an appropriate distance in all directions utilizing iris scissors.  The opposing transposition arms were then transposed into place in opposite direction and anchored with interrupted buried subcutaneous sutures.
Z Plasty Text: The lesion was extirpated to the level of the fat with a #15 scalpel blade.  Given the location of the defect, shape of the defect and the proximity to free margins a Z-plasty was deemed most appropriate for repair.  Using a sterile surgical marker, the appropriate transposition arms of the Z-plasty were drawn incorporating the defect and placing the expected incisions within the relaxed skin tension lines where possible.    The area thus outlined was incised deep to adipose tissue with a #15 scalpel blade.  The skin margins were undermined to an appropriate distance in all directions utilizing iris scissors.  The opposing transposition arms were then transposed into place in opposite direction and anchored with interrupted buried subcutaneous sutures.
Zygomaticofacial Flap Text: Given the location of the defect, shape of the defect and the proximity to free margins a zygomaticofacial flap was deemed most appropriate for repair.  Using a sterile surgical marker, the appropriate flap was drawn incorporating the defect and placing the expected incisions within the relaxed skin tension lines where possible. The area thus outlined was incised deep to adipose tissue with a #15 scalpel blade with preservation of a vascular pedicle.  The skin margins were undermined to an appropriate distance in all directions utilizing iris scissors.  The flap was then placed into the defect and anchored with interrupted buried subcutaneous sutures.
Abbe Flap (Lower To Upper Lip) Text: The defect of the upper lip was assessed and measured.  Given the location and size of the defect, an Abbe flap was deemed most appropriate.  Using a sterile surgical marker, an appropriate Abbe flap was measured and drawn on the lower lip. Local anesthesia was then infiltrated. A scalpel was then used to incise the upper lip through and through the skin, vermilion, muscle and mucosa, leaving the flap pedicled on the opposite side.  The flap was then rotated and transferred to the lower lip defect.  The flap was then sutured into place with a three layer technique, closing the orbicularis oris muscle layer with subcutaneous buried sutures, followed by a mucosal layer and an epidermal layer.
Abbe Flap (Upper To Lower Lip) Text: The defect of the lower lip was assessed and measured.  Given the location and size of the defect, an Abbe flap was deemed most appropriate.  Using a sterile surgical marker, an appropriate Abbe flap was measured and drawn on the upper lip. Local anesthesia was then infiltrated.  A scalpel was then used to incise the upper lip through and through the skin, vermilion, muscle and mucosa, leaving the flap pedicled on the opposite side.  The flap was then rotated and transferred to the lower lip defect.  The flap was then sutured into place with a three layer technique, closing the orbicularis oris muscle layer with subcutaneous buried sutures, followed by a mucosal layer and an epidermal layer.
Cheek Interpolation Flap Text: A decision was made to reconstruct the defect utilizing an interpolation axial flap and a staged reconstruction.  A telfa template was made of the defect.  This telfa template was then used to outline the Cheek Interpolation flap.  The donor area for the pedicle flap was then injected with anesthesia.  The flap was excised through the skin and subcutaneous tissue down to the layer of the underlying musculature.  The interpolation flap was carefully excised within this deep plane to maintain its blood supply.  The edges of the donor site were undermined.   The donor site was closed in a primary fashion.  The pedicle was then rotated into position and sutured.  Once the tube was sutured into place, adequate blood supply was confirmed with blanching and refill.  The pedicle was then wrapped with xeroform gauze and dressed appropriately with a telfa and gauze bandage to ensure continued blood supply and protect the attached pedicle.
Cheek-To-Nose Interpolation Flap Text: A decision was made to reconstruct the defect utilizing an interpolation axial flap and a staged reconstruction.  A telfa template was made of the defect.  This telfa template was then used to outline the Cheek-To-Nose Interpolation flap.  The donor area for the pedicle flap was then injected with anesthesia.  The flap was excised through the skin and subcutaneous tissue down to the layer of the underlying musculature.  The interpolation flap was carefully excised within this deep plane to maintain its blood supply.  The edges of the donor site were undermined.   The donor site was closed in a primary fashion.  The pedicle was then rotated into position and sutured.  Once the tube was sutured into place, adequate blood supply was confirmed with blanching and refill.  The pedicle was then wrapped with xeroform gauze and dressed appropriately with a telfa and gauze bandage to ensure continued blood supply and protect the attached pedicle.
Estlander Flap (Lower To Upper Lip) Text: The defect of the lower lip was assessed and measured.  Given the location and size of the defect, an Estlander flap was deemed most appropriate.  Using a sterile surgical marker, an appropriate Estlander flap was measured and drawn on the upper lip. Local anesthesia was then infiltrated. A scalpel was then used to incise the lateral aspect of the flap, through skin, muscle and mucosa, leaving the flap pedicled medially.  The flap was then rotated and positioned to fill the lower lip defect.  The flap was then sutured into place with a three layer technique, closing the orbicularis oris muscle layer with subcutaneous buried sutures, followed by a mucosal layer and an epidermal layer.
Interpolation Flap Text: A decision was made to reconstruct the defect utilizing an interpolation axial flap and a staged reconstruction.  A telfa template was made of the defect.  This telfa template was then used to outline the interpolation flap.  The donor area for the pedicle flap was then injected with anesthesia.  The flap was excised through the skin and subcutaneous tissue down to the layer of the underlying musculature.  The interpolation flap was carefully excised within this deep plane to maintain its blood supply.  The edges of the donor site were undermined.   The donor site was closed in a primary fashion.  The pedicle was then rotated into position and sutured.  Once the tube was sutured into place, adequate blood supply was confirmed with blanching and refill.  The pedicle was then wrapped with xeroform gauze and dressed appropriately with a telfa and gauze bandage to ensure continued blood supply and protect the attached pedicle.
Melolabial Interpolation Flap Text: A decision was made to reconstruct the defect utilizing an interpolation axial flap and a staged reconstruction.  A telfa template was made of the defect.  This telfa template was then used to outline the melolabial interpolation flap.  The donor area for the pedicle flap was then injected with anesthesia.  The flap was excised through the skin and subcutaneous tissue down to the layer of the underlying musculature.  The pedicle flap was carefully excised within this deep plane to maintain its blood supply.  The edges of the donor site were undermined.   The donor site was closed in a primary fashion.  The pedicle was then rotated into position and sutured.  Once the tube was sutured into place, adequate blood supply was confirmed with blanching and refill.  The pedicle was then wrapped with xeroform gauze and dressed appropriately with a telfa and gauze bandage to ensure continued blood supply and protect the attached pedicle.
Mastoid Interpolation Flap Text: A decision was made to reconstruct the defect utilizing an interpolation axial flap and a staged reconstruction.  A telfa template was made of the defect.  This telfa template was then used to outline the mastoid interpolation flap.  The donor area for the pedicle flap was then injected with anesthesia.  The flap was excised through the skin and subcutaneous tissue down to the layer of the underlying musculature.  The pedicle flap was carefully excised within this deep plane to maintain its blood supply.  The edges of the donor site were undermined.   The donor site was closed in a primary fashion.  The pedicle was then rotated into position and sutured.  Once the tube was sutured into place, adequate blood supply was confirmed with blanching and refill.  The pedicle was then wrapped with xeroform gauze and dressed appropriately with a telfa and gauze bandage to ensure continued blood supply and protect the attached pedicle.
Paramedian Forehead Flap Text: A decision was made to reconstruct the defect utilizing an interpolation axial flap and a staged reconstruction.  A telfa template was made of the defect.  This telfa template was then used to outline the paramedian forehead pedicle flap.  The donor area for the pedicle flap was then injected with anesthesia.  The flap was excised through the skin and subcutaneous tissue down to the layer of the underlying musculature.  The pedicle flap was carefully excised within this deep plane to maintain its blood supply.  The edges of the donor site were undermined.   The donor site was closed in a primary fashion.  The pedicle was then rotated into position and sutured.  Once the tube was sutured into place, adequate blood supply was confirmed with blanching and refill.  The pedicle was then wrapped with xeroform gauze and dressed appropriately with a telfa and gauze bandage to ensure continued blood supply and protect the attached pedicle.
Posterior Auricular Interpolation Flap Text: A decision was made to reconstruct the defect utilizing an interpolation axial flap and a staged reconstruction.  A telfa template was made of the defect.  This telfa template was then used to outline the posterior auricular interpolation flap.  The donor area for the pedicle flap was then injected with anesthesia.  The flap was excised through the skin and subcutaneous tissue down to the layer of the underlying musculature.  The pedicle flap was carefully excised within this deep plane to maintain its blood supply.  The edges of the donor site were undermined.   The donor site was closed in a primary fashion.  The pedicle was then rotated into position and sutured.  Once the tube was sutured into place, adequate blood supply was confirmed with blanching and refill.  The pedicle was then wrapped with xeroform gauze and dressed appropriately with a telfa and gauze bandage to ensure continued blood supply and protect the attached pedicle.
Cheiloplasty (Complex) Text: A decision was made to reconstruct the defect with a  cheiloplasty.  The defect was undermined extensively.  Additional orbicularis oris muscle was excised with a 15 blade scalpel.  The defect was converted into a full thickness wedge to facilite a better cosmetic result.  Small vessels were then tied off with 5-0 monocyrl. The orbicularis oris, superficial fascia, adipose and dermis were then reapproximated.  After the deeper layers were approximated the epidermis was reapproximated with particular care given to realign the vermilion border.
Cheiloplasty (Less Than 50%) Text: A decision was made to reconstruct the defect with a  cheiloplasty.  The defect was undermined extensively.  Additional orbicularis oris muscle was excised with a 15 blade scalpel.  The defect was converted into a full thickness wedge, of less than 50% of the vertical height of the lip, to facilite a better cosmetic result.  Small vessels were then tied off with 5-0 monocyrl. The orbicularis oris, superficial fascia, adipose and dermis were then reapproximated.  After the deeper layers were approximated the epidermis was reapproximated with particular care given to realign the vermilion border.
Ear Wedge Repair Text: A wedge excision was completed by carrying down an excision through the full thickness of the ear and cartilage with an inward facing Burow's triangle. The wound was then closed in a layered fashion.
Full Thickness Lip Wedge Repair (Flap) Text: Given the location of the defect and the proximity to free margins a full thickness wedge repair was deemed most appropriate.  Using a sterile surgical marker, the appropriate repair was drawn incorporating the defect and placing the expected incisions perpendicular to the vermilion border.  The vermilion border was also meticulously outlined to ensure appropriate reapproximation during the repair.  The area thus outlined was incised through and through with a #15 scalpel blade.  The muscularis and dermis were reaproximated with deep sutures following hemostasis. Care was taken to realign the vermilion border before proceeding with the superficial closure.  Once the vermilion was realigned the superfical and mucosal closure was finished.
Burow's Graft Text: The defect edges were debeveled with a #15 scalpel blade.  Given the location of the defect, shape of the defect, the proximity to free margins and the presence of a standing cone deformity a Burow's skin graft was deemed most appropriate. The standing cone was removed and this tissue was then trimmed to the shape of the primary defect. The adipose tissue was also removed until only dermis and epidermis were left.  The skin margins of the secondary defect were undermined to an appropriate distance in all directions utilizing iris scissors.  The secondary defect was closed with interrupted buried subcutaneous sutures.  The skin edges were then re-apposed with running  sutures.  The skin graft was then placed in the primary defect and oriented appropriately.
Cartilage Graft Text: The defect edges were debeveled with a #15 scalpel blade.  Given the location of the defect, shape of the defect, the fact the defect involved a full thickness cartilage defect a cartilage graft was deemed most appropriate.  An appropriate donor site was identified, cleansed, and anesthetized. The cartilage graft was then harvested and transferred to the recipient site, oriented appropriately and then sutured into place.  The secondary defect was then repaired using a primary closure.
Composite Graft Text: The defect edges were debeveled with a #15 scalpel blade.  Given the location of the defect, shape of the defect, the proximity to free margins and the fact the defect was full thickness a composite graft was deemed most appropriate.  The defect was outline and then transferred to the donor site.  A full thickness graft was then excised from the donor site. The graft was then placed in the primary defect, oriented appropriately and then sutured into place.  The secondary defect was then repaired using a primary closure.
Epidermal Autograft Text: The defect edges were debeveled with a #15 scalpel blade.  Given the location of the defect, shape of the defect and the proximity to free margins an epidermal autograft was deemed most appropriate.  Using a sterile surgical marker, the primary defect shape was transferred to the donor site. The epidermal graft was then harvested.  The skin graft was then placed in the primary defect and oriented appropriately.
Dermal Autograft Text: The defect edges were debeveled with a #15 scalpel blade.  Given the location of the defect, shape of the defect and the proximity to free margins a dermal autograft was deemed most appropriate.  Using a sterile surgical marker, the primary defect shape was transferred to the donor site. The area thus outlined was incised deep to adipose tissue with a #15 scalpel blade.  The harvested graft was then trimmed of adipose and epidermal tissue until only dermis was left.  The skin graft was then placed in the primary defect and oriented appropriately.
Ftsg Text: The defect edges were debeveled with a #15 scalpel blade.  Given the location of the defect, shape of the defect and the proximity to free margins a full thickness skin graft was deemed most appropriate.  Using a sterile surgical marker, the primary defect shape was transferred to the donor site. The area thus outlined was incised deep to adipose tissue with a #15 scalpel blade.  The harvested graft was then trimmed of adipose tissue until only dermis and epidermis was left.  The skin margins of the secondary defect were undermined to an appropriate distance in all directions utilizing iris scissors.  The secondary defect was closed with interrupted buried subcutaneous sutures.  The skin edges were then re-apposed with running  sutures.  The skin graft was then placed in the primary defect and oriented appropriately.
Pinch Graft Text: The defect edges were debeveled with a #15 scalpel blade. Given the location of the defect, shape of the defect and the proximity to free margins a pinch graft was deemed most appropriate. Using a sterile surgical marker, the primary defect shape was transferred to the donor site. The area thus outlined was incised deep to adipose tissue with a #15 scalpel blade.  The harvested graft was then trimmed of adipose tissue until only dermis and epidermis was left. The skin graft was then placed in the primary defect and oriented appropriately.
Skin Substitute Text: The defect edges were debeveled with a #15 scalpel blade.  Given the location of the defect, shape of the defect and the proximity to free margins a skin substitute graft was deemed most appropriate.  The graft material was trimmed to fit the size of the defect. The graft was then placed in the primary defect and oriented appropriately.
Split-Thickness Skin Graft Text: The defect edges were debeveled with a #15 scalpel blade.  Given the location of the defect, shape of the defect and the proximity to free margins a split thickness skin graft was deemed most appropriate.  Using a sterile surgical marker, the primary defect shape was transferred to the donor site. The split thickness graft was then harvested.  The skin graft was then placed in the primary defect and oriented appropriately.
Tissue Cultured Epidermal Autograft Text: The defect edges were debeveled with a #15 scalpel blade.  Given the location of the defect, shape of the defect and the proximity to free margins a tissue cultured epidermal autograft was deemed most appropriate.  The graft was then trimmed to fit the size of the defect.  The graft was then placed in the primary defect and oriented appropriately.
Xenograft Text: The defect edges were debeveled with a #15 scalpel blade.  Given the location of the defect, shape of the defect and the proximity to free margins a xenograft was deemed most appropriate.  The graft was then trimmed to fit the size of the defect.  The graft was then placed in the primary defect and oriented appropriately.
Complex Repair And Flap Additional Text (Will Appearing After The Standard Complex Repair Text): The complex repair was not sufficient to completely close the primary defect. The remaining additional defect was repaired with the flap mentioned below.
Complex Repair And Graft Additional Text (Will Appearing After The Standard Complex Repair Text): The complex repair was not sufficient to completely close the primary defect. The remaining additional defect was repaired with the graft mentioned below.
Eyelid Full Thickness Repair - 78237: The eyelid defect was full thickness which required a wedge repair of the eyelid. Special care was taken to ensure that the eyelid margin was realligned when placing sutures.
Eyelid Partial Thickness Repair - 71174: The eyelid defect was partial thickness which required a wedge repair of the eyelid. Special care was taken to ensure that the eyelid margin was realligned when placing sutures.
Intermediate Repair And Flap Additional Text (Will Appearing After The Standard Complex Repair Text): The intermediate repair was not sufficient to completely close the primary defect. The remaining additional defect was repaired with the flap mentioned below.
Intermediate Repair And Graft Additional Text (Will Appearing After The Standard Complex Repair Text): The intermediate repair was not sufficient to completely close the primary defect. The remaining additional defect was repaired with the graft mentioned below.
Localized Dermabrasion With 15 Blade Text: The patient was draped in routine manner.  Localized dermabrasion using a 15 blade was performed in routine manner to papillary dermis. This spot dermabrasion is being performed to complete skin cancer reconstruction. It also will eliminate the other sun damaged precancerous cells that are known to be part of the regional effect of a lifetime's worth of sun exposure. This localized dermabrasion is therapeutic and should not be considered cosmetic in any regard.
Localized Dermabrasion With Sand Papertext: The patient was draped in routine manner.  Localized dermabrasion using sterile sand paper was performed in routine manner to papillary dermis. This spot dermabrasion is being performed to complete skin cancer reconstruction. It also will eliminate the other sun damaged precancerous cells that are known to be part of the regional effect of a lifetime's worth of sun exposure. This localized dermabrasion is therapeutic and should not be considered cosmetic in any regard.
Localized Dermabrasion With Wire Brush Text: The patient was draped in routine manner.  Localized dermabrasion using 3 x 17 mm wire brush was performed in routine manner to papillary dermis. This spot dermabrasion is being performed to complete skin cancer reconstruction. It also will eliminate the other sun damaged precancerous cells that are known to be part of the regional effect of a lifetime's worth of sun exposure. This localized dermabrasion is therapeutic and should not be considered cosmetic in any regard.
Purse String (Simple) Text: Given the location of the defect and the characteristics of the surrounding skin a purse string closure was deemed most appropriate.  Undermining was performed circumferentially around the surgical defect.  A purse string suture was then placed and tightened.
Purse String (Intermediate) Text: Given the location of the defect and the characteristics of the surrounding skin a purse string intermediate closure was deemed most appropriate.  Undermining was performed circumferentially around the surgical defect.  A purse string suture was then placed and tightened.
Partial Purse String (Simple) Text: Given the location of the defect and the characteristics of the surrounding skin a simple purse string closure was deemed most appropriate.  Undermining was performed circumferentially around the surgical defect.  A purse string suture was then placed and tightened. Wound tension only allowed a partial closure of the circular defect.
Partial Purse String (Intermediate) Text: Given the location of the defect and the characteristics of the surrounding skin an intermediate purse string closure was deemed most appropriate.  Undermining was performed circumferentially around the surgical defect.  A purse string suture was then placed and tightened. Wound tension only allowed a partial closure of the circular defect.
Tarsorrhaphy Text: A tarsorrhaphy was performed using Frost sutures.
Manual Repair Warning Statement: We plan on removing the manually selected variable below in favor of our much easier automatic structured text blocks found in the previous tab. We decided to do this to help make the flow better and give you the full power of structured data. Manual selection is never going to be ideal in our platform and I would encourage you to avoid using manual selection from this point on, especially since I will be sunsetting this feature. It is important that you do one of two things with the customized text below. First, you can save all of the text in a word file so you can have it for future reference. Second, transfer the text to the appropriate area in the Library tab. Lastly, if there is a flap or graft type which we do not have you need to let us know right away so I can add it in before the variable is hidden. No need to panic, we plan to give you roughly 6 months to make the change.
Same Histology In Subsequent Stages Text: The pattern and morphology of the tumor is as described in the first stage.
No Residual Tumor Seen Histology Text: There were no malignant cells seen in the sections examined.
Inflammation Suggestive Of Cancer Camouflage Histology Text: There was a dense lymphocytic infiltrate which prevented adequate histologic evaluation of adjacent structures.
Bcc Histology Text: There were numerous aggregates of basaloid cells.
Bcc Infiltrative Histology Text: There were numerous aggregates of basaloid cells demonstrating an infiltrative pattern.
Mart-1 - Positive Histology Text: MART-1 staining demonstrates areas of higher density and clustering of melanocytes with Pagetoid spread upwards within the epidermis. The surgical margins are positive for tumor cells.
Mart-1 - Negative Histology Text: MART-1 staining demonstrates a normal density and pattern of melanocytes along the dermal-epidermal junction. The surgical margins are negative for tumor cells.
Information: Selecting Yes will display possible errors in your note based on the variables you have selected. This validation is only offered as a suggestion for you. PLEASE NOTE THAT THE VALIDATION TEXT WILL BE REMOVED WHEN YOU FINALIZE YOUR NOTE. IF YOU WANT TO FAX A PRELIMINARY NOTE YOU WILL NEED TO TOGGLE THIS TO 'NO' IF YOU DO NOT WANT IT IN YOUR FAXED NOTE.
Bill 59 Modifier?: No - Continue to Bill 79 Modifier

## 2025-03-28 NOTE — PROCEDURE: REPAIR NOTE
Anesthesia Volume In Cc: 3.5
Did You Provide Opioid Counseling: No
Repair Type: Complex Repair
Which Eyelid Repair Cpt Are You Using?: 43583
Simple / Intermediate / Complex Repair - Final Wound Length In Cm: 5.5
Deep Sutures: 4-0 Prolene
Epidermal Sutures: 4-0 Monocryl
Suture Removal: 14 days
Suturegard Retention Suture: 2-0 Nylon
Retention Suture Bite Size: 3 mm
Length To Time In Minutes Device Was In Place: 10
Number Of Hemigard Strips Per Side: 1
Complex Requirements: Extensive Undermining Performed?: Yes
Width Of Defect Perpendicular To Closure In Cm (Required): 2
Undermining Type: Entire Wound
Debridement Text: The wound edges were debrided prior to proceeding with the closure to facilitate wound healing.
Helical Rim Text: The closure involved the helical rim.
Vermilion Border Text: The closure involved the vermilion border.
Nostril Rim Text: The closure involved the nostril rim.
Retention Suture Text: Retention sutures were placed to support the closure and prevent dehiscence.
Primary Defect Width (In Cm): 1.5
Primary Defect Depth (In Cm): 0
Skin Substitute: EpiFix Micronized
Type Of Previous Surgery (Optional- Ie Mohs Surgery): Mohs
Date Of Previous Surgery (Optional): 3/28/2025
Mohs Case Number (Optional): [id number]
Date Of Previous Biopsy (Optional): 2/19/2025
Previous Accession (Optional): [id number]
Detail Level: Detailed
Anesthesia Type: 1% lidocaine with epinephrine
Hemostasis: Electrocautery
Estimated Blood Loss (Cc): minimal
Brow Lift Text: A midfrontal incision was made medially to the defect to allow access to the tissues just superior to the left eyebrow. Following careful dissection inferiorly in a supraperiosteal plane to the level of the left eyebrow, several 3-0 monocryl sutures were used to resuspend the eyebrow orbicularis oculi muscular unit to the superior frontal bone periosteum. This resulted in an appropriate reapproximation of static eyebrow symmetry and correction of the left brow ptosis.
Epidermal Closure: simple interrupted
Wound Care: Petrolatum
Dressing: pressure dressing
Unna Boot Text: An Unna boot was placed to help immobilize the limb and facilitate more rapid healing.
Suturegard Intro: Intraoperative tissue expansion was performed, utilizing the SUTUREGARD device, in order to reduce wound tension.
Suturegard Body: The suture ends were repeatedly re-tightened and re-clamped to achieve the desired tissue expansion.
Hemigard Intro: Due to skin fragility and wound tension, it was decided to use HEMIGARD adhesive retention suture devices to permit a linear closure. The skin was cleaned and dried for a 6cm distance away from the wound. Excessive hair, if present, was removed to allow for adhesion.
Hemigard Postcare Instructions: The HEMIGARD strips are to remain completely dry for at least 5-7 days.
Graft Donor Site Bandage (Optional-Leave Blank If You Don't Want In Note): Steri-strips and a pressure bandage were applied to the donor site.
Closure 2 Information: This tab is for additional flaps and grafts, including complex repair and grafts and complex repair and flaps. You can also specify a different location for the additional defect, if the location is the same you do not need to select a new one. We will insert the automated text for the repair you select below just as we do for solitary flaps and grafts. Please note that at this time if you select a location with a different insurance zone you will need to override the ICD10 and CPT if appropriate.
Closure 3 Information: This tab is for additional flaps and grafts above and beyond our usual structured repairs.  Please note if you enter information here it will not currently bill and you will need to add the billing information manually.
Complex Repair Preamble Text (Leave Blank If You Do Not Want): Extensive wide undermining was performed.
Intermediate Repair Preamble Text (Leave Blank If You Do Not Want): Undermining was performed with blunt dissection.
Flap Thinning Complex Repair Preamble Text (Leave Blank If You Do Not Want): An incision was made along the previous flap suture line. Undermining was performed beneath the flap and redundant tissue was removed to restore the normal contour of the skin.
Non-Graft Cartilage Fenestration Text: The cartilage was fenestrated with a 2mm punch biopsy to help facilitate healing.
Graft Cartilage Fenestration Text: The cartilage was fenestrated with a 2mm punch biopsy to help facilitate graft survival and healing.
Preparation Of Recipient Site - Flap: The eschar was removed surgically with sharp dissection to facilitate appropriate wound healing of the following adjacent tissue rearrangement.
Preparation Of Recipient Site - Graft: The eschar was removed surgically with sharp dissection to facilitate appropriate survival of the following graft.
Preparation Of Recipient Site - Flap Takedown: The eschar and granulation tissue was removed surgically with sharp dissection to facilitate appropriate healing after division and inset of the proximal and distal interpolation flap.
Secondary Intention Text (Leave Blank If You Do Not Want): The defect will heal with secondary intention.
No Repair - Repaired With Adjacent Surgical Defect Text (Leave Blank If You Do Not Want): After obtaining clear surgical margins the defect was repaired concurrently with another surgical defect which was in close approximation.
Referred To Oculoplastics For Closure Text (Leave Blank If You Do Not Want): After obtaining clear surgical margins the patient was sent to oculoplastics for surgical repair.  The patient understands they will receive post-surgical care and follow-up from the referring physician's office.
Referred To Otolaryngology For Closure Text (Leave Blank If You Do Not Want): After obtaining clear surgical margins the patient was sent to otolaryngology for surgical repair.  The patient understands they will receive post-surgical care and follow-up from the referring physician's office.
Referred To Plastics For Closure Text (Leave Blank If You Do Not Want): After obtaining clear surgical margins the patient was sent to plastics for surgical repair.  The patient understands they will receive post-surgical care and follow-up from the referring physician's office.
Referred To Asc For Closure Text (Leave Blank If You Do Not Want): After obtaining clear surgical margins the patient was sent to an ASC for surgical repair.  The patient understands they will receive post-surgical care and follow-up from the ASC physician.
Referred To Mid-Level For Closure Text (Leave Blank If You Do Not Want): After obtaining clear surgical margins the patient was sent to a mid-level provider for surgical repair.  The patient understands they will receive post-surgical care and follow-up from the mid-level provider.
Repair Performed By Another Provider Text (Leave Blank If You Do Not Want): After obtaining clear surgical margins the defect was repaired by another provider.
Adjacent Tissue Transfer Text: The defect edges were debeveled with a #15 scalpel blade. Given the location of the defect and the proximity to free margins an adjacent tissue transfer was deemed most appropriate. Using a sterile surgical marker, an appropriate flap was drawn incorporating the defect and placing the expected incisions within the relaxed skin tension lines where possible. The area thus outlined was incised deep to adipose tissue with a #15 scalpel blade. The skin margins were undermined to an appropriate distance in all directions utilizing iris scissors and carried over to close the primary defect.
Advancement Flap (Single) Text: The defect edges were debeveled with a #15 scalpel blade. Given the location of the defect and the proximity to free margins a single advancement flap was deemed most appropriate. Using a sterile surgical marker, an appropriate advancement flap was drawn incorporating the defect and placing the expected incisions within the relaxed skin tension lines where possible. The area thus outlined was incised deep to adipose tissue with a #15 scalpel blade. The skin margins were undermined to an appropriate distance in all directions utilizing iris scissors. Following this, the designed flap was advanced and carried over into the primary defect and sutured into place.
Advancement Flap (Double) Text: The defect edges were debeveled with a #15 scalpel blade. Given the location of the defect and the proximity to free margins a double advancement flap was deemed most appropriate. Using a sterile surgical marker, the appropriate advancement flaps were drawn incorporating the defect and placing the expected incisions within the relaxed skin tension lines where possible. The area thus outlined was incised deep to adipose tissue with a #15 scalpel blade. The skin margins were undermined to an appropriate distance in all directions utilizing iris scissors. Following this, the designed flaps were advanced and carried over into the primary defect and sutured into place.
Advancement-Rotation Flap Text: The defect edges were debeveled with a #15 scalpel blade. Given the location of the defect, shape of the defect and the proximity to free margins an advancement-rotation flap was deemed most appropriate. Using a sterile surgical marker, an appropriate flap was drawn incorporating the defect and placing the expected incisions within the relaxed skin tension lines where possible. The area thus outlined was incised deep to adipose tissue with a #15 scalpel blade. The skin margins were undermined to an appropriate distance in all directions utilizing iris scissors. Following this, the designed flap was carried over into the primary defect and sutured into place.
Alar Island Pedicle Flap Text: The defect edges were debeveled with a #15 scalpel blade. Given the location of the defect, shape of the defect and the proximity to the alar rim an island pedicle advancement flap was deemed most appropriate. Using a sterile surgical marker, an appropriate advancement flap was drawn incorporating the defect, outlining the appropriate donor tissue and placing the expected incisions within the nasal ala running parallel to the alar rim. The area thus outlined was incised with a #15 scalpel blade. The skin margins were undermined minimally to an appropriate distance in all directions around the primary defect and laterally outward around the island pedicle utilizing iris scissors.  There was minimal undermining beneath the pedicle flap. Following this, the designed flap was carried over into the primary defect and sutured into place.
A-T Advancement Flap Text: The defect edges were debeveled with a #15 scalpel blade. Given the location of the defect, shape of the defect and the proximity to free margins an A-T advancement flap was deemed most appropriate. Using a sterile surgical marker, an appropriate advancement flap was drawn incorporating the defect and placing the expected incisions within the relaxed skin tension lines where possible. The area thus outlined was incised deep to adipose tissue with a #15 scalpel blade. The skin margins were undermined to an appropriate distance in all directions utilizing iris scissors. Following this, the designed flap was advanced and carried over into the primary defect and sutured into place.
Banner Transposition Flap Text: The defect edges were debeveled with a #15 scalpel blade. Given the location of the defect and the proximity to free margins a Banner transposition flap was deemed most appropriate. Using a sterile surgical marker, an appropriate flap was drawn around the defect. The area thus outlined was incised deep to adipose tissue with a #15 scalpel blade. The skin margins were undermined to an appropriate distance in all directions utilizing iris scissors. Following this, the designed flap was carried into the primary defect and sutured into place.
Bilateral Helical Rim Advancement Flap Text: The defect edges were debeveled with a #15 blade scalpel.  Given the location of the defect and the proximity to free margins (helical rim) a bilateral helical rim advancement flap was deemed most appropriate. Using a sterile surgical marker, the appropriate advancement flaps were drawn incorporating the defect and placing the expected incisions between the helical rim and antihelix where possible.  The area thus outlined was incised through and through with a #15 scalpel blade.  With a skin hook and iris scissors, the flaps were gently and sharply undermined and freed up. Following this, the designed flaps were placed into the primary defect and sutured into place.
Bilateral Rotation Flap Text: The defect edges were debeveled with a #15 scalpel blade. Given the location of the defect, shape of the defect and the proximity to free margins a bilateral rotation flap was deemed most appropriate. Using a sterile surgical marker, an appropriate rotation flap was drawn incorporating the defect and placing the expected incisions within the relaxed skin tension lines where possible. The area thus outlined was incised deep to adipose tissue with a #15 scalpel blade. The skin margins were undermined to an appropriate distance in all directions utilizing iris scissors. Following this, the designed flap was carried over into the primary defect and sutured into place.
Bilobed Flap Text: The defect edges were debeveled with a #15 scalpel blade. Given the location of the defect and the proximity to free margins a bilobe flap was deemed most appropriate. Using a sterile surgical marker, an appropriate bilobe flap drawn around the defect. The area thus outlined was incised deep to adipose tissue with a #15 scalpel blade. The skin margins were undermined to an appropriate distance in all directions utilizing iris scissors. Following this, the designed flap was carried over into the primary defect and sutured into place.
Bilobed Transposition Flap Text: The defect edges were debeveled with a #15 scalpel blade. Given the location of the defect and the proximity to free margins a bilobed transposition flap was deemed most appropriate. Using a sterile surgical marker, an appropriate bilobe flap drawn around the defect. The area thus outlined was incised deep to adipose tissue with a #15 scalpel blade. The skin margins were undermined to an appropriate distance in all directions utilizing iris scissors. Following this, the designed flap was carried over into the primary defect and sutured into place.
Bi-Rhombic Flap Text: The defect edges were debeveled with a #15 scalpel blade. Given the location of the defect and the proximity to free margins a bi-rhombic flap was deemed most appropriate. Using a sterile surgical marker, an appropriate rhombic flap was drawn incorporating the defect. The area thus outlined was incised deep to adipose tissue with a #15 scalpel blade. The skin margins were undermined to an appropriate distance in all directions utilizing iris scissors. Following this, the designed flap was carried over into the primary defect and sutured into place.
Burow's Advancement Flap Text: The defect edges were debeveled with a #15 scalpel blade. Given the location of the defect and the proximity to free margins a Burow's advancement flap was deemed most appropriate. Using a sterile surgical marker, the appropriate advancement flap was drawn incorporating the defect and placing the expected incisions within the relaxed skin tension lines where possible. The area thus outlined was incised deep to adipose tissue with a #15 scalpel blade. The skin margins were undermined to an appropriate distance in all directions utilizing iris scissors. Following this, the designed flap was advanced and carried over into the primary defect and sutured into place.
Chonodrocutaneous Helical Advancement Flap Text: The defect edges were debeveled with a #15 scalpel blade. Given the location of the defect and the proximity to free margins a chondrocutaneous helical advancement flap was deemed most appropriate. Using a sterile surgical marker, the appropriate advancement flap was drawn incorporating the defect and placing the expected incisions within the relaxed skin tension lines where possible. The area thus outlined was incised deep to adipose tissue with a #15 scalpel blade. The skin margins were undermined to an appropriate distance in all directions utilizing iris scissors. Following this, the designed flap was advanced and carried over into the primary defect and sutured into place.
Crescentic Advancement Flap Text: The defect edges were debeveled with a #15 scalpel blade. Given the location of the defect and the proximity to free margins a crescentic advancement flap was deemed most appropriate. Using a sterile surgical marker, the appropriate advancement flap was drawn incorporating the defect and placing the expected incisions within the relaxed skin tension lines where possible. The area thus outlined was incised deep to adipose tissue with a #15 scalpel blade. The skin margins were undermined to an appropriate distance in all directions utilizing iris scissors. Following this, the designed flap was advanced and carried over into the primary defect and sutured into place.
Dorsal Nasal Flap Text: The defect edges were debeveled with a #15 scalpel blade. Given the location of the defect and the proximity to free margins a dorsal nasal flap was deemed most appropriate. Using a sterile surgical marker, an appropriate dorsal nasal flap was drawn around the defect. The area thus outlined was incised deep to adipose tissue with a #15 scalpel blade. The skin margins were undermined to an appropriate distance in all directions utilizing iris scissors. Following this, the designed flap was carried into the primary defect and sutured into place.
Double Island Pedicle Flap Text: The defect edges were debeveled with a #15 scalpel blade. Given the location of the defect, shape of the defect and the proximity to free margins a double island pedicle advancement flap was deemed most appropriate. Using a sterile surgical marker, an appropriate advancement flap was drawn incorporating the defect, outlining the appropriate donor tissue and placing the expected incisions within the relaxed skin tension lines where possible. The area thus outlined was incised deep to adipose tissue with a #15 scalpel blade. The skin margins were undermined to an appropriate distance in all directions around the primary defect and laterally outward around the island pedicle utilizing iris scissors.  There was minimal undermining beneath the pedicle flap. Following this, the flap was carried over into the primary defect and sutured into place.
Double O-Z Flap Text: The defect edges were debeveled with a #15 scalpel blade. Given the location of the defect, shape of the defect and the proximity to free margins a Double O-Z flap was deemed most appropriate. Using a sterile surgical marker, an appropriate transposition flap was drawn incorporating the defect and placing the expected incisions within the relaxed skin tension lines where possible. The area thus outlined was incised deep to adipose tissue with a #15 scalpel blade. The skin margins were undermined to an appropriate distance in all directions utilizing iris scissors. Following this, the designed flap was carried over into the primary defect and sutured into place.
Double O-Z Plasty Text: The defect edges were debeveled with a #15 scalpel blade. Given the location of the defect, shape of the defect and the proximity to free margins a Double O-Z plasty (double transposition flap) was deemed most appropriate. Using a sterile surgical marker, the appropriate transposition flaps were drawn incorporating the defect and placing the expected incisions within the relaxed skin tension lines where possible. The area thus outlined was incised deep to adipose tissue with a #15 scalpel blade. The skin margins were undermined to an appropriate distance in all directions utilizing iris scissors. Hemostasis was achieved with electrocautery. The flaps were then transposed and carried over into place, one clockwise and the other counterclockwise, and anchored with interrupted buried subcutaneous sutures.
Double Z Plasty Text: The lesion was extirpated to the level of the fat with a #15 scalpel blade. Given the location of the defect, shape of the defect and the proximity to free margins a double Z-plasty was deemed most appropriate for repair. Using a sterile surgical marker, the appropriate transposition arms of the double Z-plasty were drawn incorporating the defect and placing the expected incisions within the relaxed skin tension lines where possible. The area thus outlined was incised deep to adipose tissue with a #15 scalpel blade. The skin margins were undermined to an appropriate distance in all directions utilizing iris scissors. The opposing transposition arms were then transposed and carried over into place in opposite direction and anchored with interrupted buried subcutaneous sutures.
Ear Star Wedge Flap Text: The defect edges were debeveled with a #15 blade scalpel.  Given the location of the defect and the proximity to free margins (helical rim) an ear star wedge flap was deemed most appropriate. Using a sterile surgical marker, the appropriate flap was drawn incorporating the defect and placing the expected incisions between the helical rim and antihelix where possible.  The area thus outlined was incised through and through with a #15 scalpel blade. Following this, the designed flap was carried over into the primary defect and sutured into place.
Flip-Flop Flap Text: The defect edges were debeveled with a #15 blade scalpel.  Given the location of the defect and the proximity to free margins a flip-flop flap was deemed most appropriate. Using a sterile surgical marker, the appropriate flap was drawn incorporating the defect and placing the expected incisions between the helical rim and antihelix where possible.  The area thus outlined was incised through and through with a #15 scalpel blade. Following this, the designed flap was carried over into the primary defect and sutured into place.
Hatchet Flap Text: The defect edges were debeveled with a #15 scalpel blade. Given the location of the defect, shape of the defect and the proximity to free margins a hatchet flap was deemed most appropriate. Using a sterile surgical marker, an appropriate hatchet flap was drawn incorporating the defect and placing the expected incisions within the relaxed skin tension lines where possible. The area thus outlined was incised deep to adipose tissue with a #15 scalpel blade. The skin margins were undermined to an appropriate distance in all directions utilizing iris scissors. Following this, the designed flap was carried over into the primary defect and sutured into place.
Helical Rim Advancement Flap Text: The defect edges were debeveled with a #15 blade scalpel.  Given the location of the defect and the proximity to free margins (helical rim) a double helical rim advancement flap was deemed most appropriate. Using a sterile surgical marker, the appropriate advancement flaps were drawn incorporating the defect and placing the expected incisions between the helical rim and antihelix where possible.  The area thus outlined was incised through and through with a #15 scalpel blade.  With a skin hook and iris scissors, the flaps were gently and sharply undermined and freed up. Folllowing this, the designed flaps were carried over into the primary defect and sutured into place.
H Plasty Text: Given the location of the defect, shape of the defect and the proximity to free margins a H-plasty was deemed most appropriate for repair. Using a sterile surgical marker, the appropriate advancement arms of the H-plasty were drawn incorporating the defect and placing the expected incisions within the relaxed skin tension lines where possible. The area thus outlined was incised deep to adipose tissue with a #15 scalpel blade. The skin margins were undermined to an appropriate distance in all directions utilizing iris scissors.  The opposing advancement arms were then advanced and carried over into place in opposite direction and anchored with interrupted buried subcutaneous sutures.
Island Pedicle Flap Text: The defect edges were debeveled with a #15 scalpel blade. Given the location of the defect, shape of the defect and the proximity to free margins an island pedicle advancement flap was deemed most appropriate. Using a sterile surgical marker, an appropriate advancement flap was drawn incorporating the defect, outlining the appropriate donor tissue and placing the expected incisions within the relaxed skin tension lines where possible. The area thus outlined was incised deep to adipose tissue with a #15 scalpel blade. The skin margins were undermined to an appropriate distance in all directions around the primary defect and laterally outward around the island pedicle utilizing iris scissors.  There was minimal undermining beneath the pedicle flap. Following this, the flap was carried over into the primary defect and sutured into place.
Island Pedicle Flap With Canthal Suspension Text: The defect edges were debeveled with a #15 scalpel blade. Given the location of the defect, shape of the defect and the proximity to free margins an island pedicle advancement flap was deemed most appropriate. Using a sterile surgical marker, an appropriate advancement flap was drawn incorporating the defect, outlining the appropriate donor tissue and placing the expected incisions within the relaxed skin tension lines where possible. The area thus outlined was incised deep to adipose tissue with a #15 scalpel blade. The skin margins were undermined to an appropriate distance in all directions around the primary defect and laterally outward around the island pedicle utilizing iris scissors.  There was minimal undermining beneath the pedicle flap. A suspension suture was placed in the canthal tendon to prevent tension and prevent ectropion. Following this, the designed flap was placed into the primary defect and sutured into place.
Island Pedicle Flap-Requiring Vessel Identification Text: The defect edges were debeveled with a #15 scalpel blade. Given the location of the defect, shape of the defect and the proximity to free margins an island pedicle advancement flap was deemed most appropriate. Using a sterile surgical marker, an appropriate advancement flap was drawn, based on the axial vessel mentioned above, incorporating the defect, outlining the appropriate donor tissue and placing the expected incisions within the relaxed skin tension lines where possible. The area thus outlined was incised deep to adipose tissue with a #15 scalpel blade. The skin margins were undermined to an appropriate distance in all directions around the primary defect and laterally outward around the island pedicle utilizing iris scissors.  There was minimal undermining beneath the pedicle flap. Following this, the designed flap was carried over into the primary defect and sutured into place.
Keystone Flap Text: The defect edges were debeveled with a #15 scalpel blade. Given the location of the defect, shape of the defect a keystone flap was deemed most appropriate. Using a sterile surgical marker, an appropriate keystone flap was drawn incorporating the defect, outlining the appropriate donor tissue and placing the expected incisions within the relaxed skin tension lines where possible. The area thus outlined was incised deep to adipose tissue with a #15 scalpel blade. The skin margins were undermined to an appropriate distance in all directions around the primary defect and laterally outward around the flap utilizing iris scissors. Following this, the designed flap was carried into the primary defect and sutured into place.
Melolabial Transposition Flap Text: The defect edges were debeveled with a #15 scalpel blade. Given the location of the defect and the proximity to free margins a melolabial flap was deemed most appropriate. Using a sterile surgical marker, an appropriate melolabial transposition flap was drawn incorporating the defect. The area thus outlined was incised deep to adipose tissue with a #15 scalpel blade. The skin margins were undermined to an appropriate distance in all directions utilizing iris scissors. Following this, the designed flap was carried over into the primary defect and sutured into place.
Mercedes Flap Text: The defect edges were debeveled with a #15 scalpel blade. Given the location of the defect, shape of the defect and the proximity to free margins a Mercedes flap was deemed most appropriate. Using a sterile surgical marker, an appropriate advancement flap was drawn incorporating the defect and placing the expected incisions within the relaxed skin tension lines where possible. The area thus outlined was incised deep to adipose tissue with a #15 scalpel blade. The skin margins were undermined to an appropriate distance in all directions utilizing iris scissors. Following this, the designed flap was advanced and carried over into the primary defect and sutured into place.
Modified Advancement Flap Text: The defect edges were debeveled with a #15 scalpel blade. Given the location of the defect, shape of the defect and the proximity to free margins a modified advancement flap was deemed most appropriate. Using a sterile surgical marker, an appropriate advancement flap was drawn incorporating the defect and placing the expected incisions within the relaxed skin tension lines where possible. The area thus outlined was incised deep to adipose tissue with a #15 scalpel blade. The skin margins were undermined to an appropriate distance in all directions utilizing iris scissors. Following this, the designed flap was advanced and carried over into the primary defect and sutured into place.
Mucosal Advancement Flap Text: Given the location of the defect, shape of the defect and the proximity to free margins a mucosal advancement flap was deemed most appropriate. Incisions were made with a 15 blade scalpel in the appropriate fashion along the cutaneous vermilion border and the mucosal lip. The remaining actinically damaged mucosal tissue was excised.  The mucosal advancement flap was then elevated to the gingival sulcus with care taken to preserve the neurovascular structures and advanced into the primary defect. Care was taken to ensure that precise realignment of the vermilion border was achieved.
Muscle Hinge Flap Text: The defect edges were debeveled with a #15 scalpel blade.  Given the size, depth and location of the defect and the proximity to free margins a muscle hinge flap was deemed most appropriate. Using a sterile surgical marker, an appropriate hinge flap was drawn incorporating the defect. The area thus outlined was incised with a #15 scalpel blade. The skin margins were undermined to an appropriate distance in all directions utilizing iris scissors. Following this, the designed flap was carried into the primary defect and sutured into place.
Mustarde Flap Text: The defect edges were debeveled with a #15 scalpel blade.  Given the size, depth and location of the defect and the proximity to free margins a Mustarde flap was deemed most appropriate. Using a sterile surgical marker, an appropriate flap was drawn incorporating the defect. The area thus outlined was incised with a #15 scalpel blade. The skin margins were undermined to an appropriate distance in all directions utilizing iris scissors. Following this, the designed flap was carried into the primary defect and sutured into place.
Nasal Turnover Hinge Flap Text: The defect edges were debeveled with a #15 scalpel blade.  Given the size, depth, location of the defect and the defect being full thickness a nasal turnover hinge flap was deemed most appropriate. Using a sterile surgical marker, an appropriate hinge flap was drawn incorporating the defect. The area thus outlined was incised with a #15 scalpel blade. The flap was designed to recreate the nasal mucosal lining and the alar rim. The skin margins were undermined to an appropriate distance in all directions utilizing iris scissors. Following this, the designed flap was carried over into the primary defect and sutured into place
Nasalis-Muscle-Based Myocutaneous Island Pedicle Flap Text: Using a #15 blade, an incision was made around the donor flap to the level of the nasalis muscle. Wide lateral undermining was then performed in both the subcutaneous plane above the nasalis muscle, and in a submuscular plane just above periosteum. This allowed the formation of a free nasalis muscle axial pedicle (based on the angular artery) which was still attached to the actual cutaneous flap, increasing its mobility and vascular viability. Hemostasis was obtained with pinpoint electrocoagulation. The flap was mobilized into position and the pivotal anchor points positioned and stabilized with buried interrupted sutures. Subcutaneous and dermal tissues were closed in a multilayered fashion with sutures. Tissue redundancies were excised, and the epidermal edges were apposed without significant tension and sutured with sutures.
Nasalis Myocutaneous Flap Text: Using a #15 blade, an incision was made around the donor flap to the level of the nasalis muscle. Wide lateral undermining was then performed in both the subcutaneous plane above the nasalis muscle, and in a submuscular plane just above periosteum. This allowed the formation of a free nasalis muscle axial pedicle which was still attached to the actual cutaneous flap, increasing its mobility and vascular viability. Hemostasis was obtained with pinpoint electrocoagulation. The flap was mobilized into position and the pivotal anchor points positioned and stabilized with buried interrupted sutures. Subcutaneous and dermal tissues were closed in a multilayered fashion with sutures. Tissue redundancies were excised, and the epidermal edges were apposed without significant tension and sutured with sutures.
Nasolabial Transposition Flap Text: The defect edges were debeveled with a #15 scalpel blade.  Given the size, depth and location of the defect and the proximity to free margins a nasolabial transposition flap was deemed most appropriate. Using a sterile surgical marker, an appropriate flap was drawn incorporating the defect. The area thus outlined was incised with a #15 scalpel blade. The skin margins were undermined to an appropriate distance in all directions utilizing iris scissors. Following this, the designed flap was carried into the primary defect and sutured into place.
Orbicularis Oris Muscle Flap Text: The defect edges were debeveled with a #15 scalpel blade.  Given that the defect affected the competency of the oral sphincter an orbicularis oris muscle flap was deemed most appropriate to restore this competency and normal muscle function.  Using a sterile surgical marker, an appropriate flap was drawn incorporating the defect. The area thus outlined was incised with a #15 scalpel blade. Following this, the designed flap was carried over into the primary defect and sutured into place.
O-T Advancement Flap Text: The defect edges were debeveled with a #15 scalpel blade. Given the location of the defect, shape of the defect and the proximity to free margins an O-T advancement flap was deemed most appropriate. Using a sterile surgical marker, an appropriate advancement flap was drawn incorporating the defect and placing the expected incisions within the relaxed skin tension lines where possible. The area thus outlined was incised deep to adipose tissue with a #15 scalpel blade. The skin margins were undermined to an appropriate distance in all directions utilizing iris scissors. Following this, the designed flap was advanced and carried over into the primary defect and sutured into place.
O-T Plasty Text: The defect edges were debeveled with a #15 scalpel blade. Given the location of the defect, shape of the defect and the proximity to free margins an O-T plasty was deemed most appropriate. Using a sterile surgical marker, an appropriate O-T plasty was drawn incorporating the defect and placing the expected incisions within the relaxed skin tension lines where possible. The area thus outlined was incised deep to adipose tissue with a #15 scalpel blade. The skin margins were undermined to an appropriate distance in all directions utilizing iris scissors. Following this, the designed flap was carried over into the primary defect and sutured into place.
O-L Flap Text: The defect edges were debeveled with a #15 scalpel blade. Given the location of the defect, shape of the defect and the proximity to free margins an O-L flap was deemed most appropriate. Using a sterile surgical marker, an appropriate advancement flap was drawn incorporating the defect and placing the expected incisions within the relaxed skin tension lines where possible. The area thus outlined was incised deep to adipose tissue with a #15 scalpel blade. The skin margins were undermined to an appropriate distance in all directions utilizing iris scissors. Following this, the designed flap was advanced and carried over into the primary defect and sutured into place.
O-Z Flap Text: The defect edges were debeveled with a #15 scalpel blade. Given the location of the defect, shape of the defect and the proximity to free margins an O-Z flap was deemed most appropriate. Using a sterile surgical marker, an appropriate transposition flap was drawn incorporating the defect and placing the expected incisions within the relaxed skin tension lines where possible. The area thus outlined was incised deep to adipose tissue with a #15 scalpel blade. The skin margins were undermined to an appropriate distance in all directions utilizing iris scissors. Following this, the designed flap was carried over into the primary defect and sutured into place.
O-Z Plasty Text: The defect edges were debeveled with a #15 scalpel blade. Given the location of the defect, shape of the defect and the proximity to free margins an O-Z plasty (double transposition flap) was deemed most appropriate. Using a sterile surgical marker, the appropriate transposition flaps were drawn incorporating the defect and placing the expected incisions within the relaxed skin tension lines where possible. The area thus outlined was incised deep to adipose tissue with a #15 scalpel blade. The skin margins were undermined to an appropriate distance in all directions utilizing iris scissors. Hemostasis was achieved with electrocautery. The flaps were then transposed and carried over into place, one clockwise and the other counterclockwise, and anchored with interrupted buried subcutaneous sutures.
Peng Advancement Flap Text: The defect edges were debeveled with a #15 scalpel blade. Given the location of the defect, shape of the defect and the proximity to free margins a Peng advancement flap was deemed most appropriate. Using a sterile surgical marker, an appropriate advancement flap was drawn incorporating the defect and placing the expected incisions within the relaxed skin tension lines where possible. The area thus outlined was incised deep to adipose tissue with a #15 scalpel blade. The skin margins were undermined to an appropriate distance in all directions utilizing iris scissors. Following this, the designed flap was advanced and carried over into the primary defect and sutured into place.
Rectangular Flap Text: The defect edges were debeveled with a #15 scalpel blade. Given the location of the defect and the proximity to free margins a rectangular flap was deemed most appropriate. Using a sterile surgical marker, an appropriate rectangular flap was drawn incorporating the defect. The area thus outlined was incised deep to adipose tissue with a #15 scalpel blade. The skin margins were undermined to an appropriate distance in all directions utilizing iris scissors. Following this, the designed flap was carried over into the primary defect and sutured into place.
Rhombic Flap Text: The defect edges were debeveled with a #15 scalpel blade. Given the location of the defect and the proximity to free margins a rhombic flap was deemed most appropriate. Using a sterile surgical marker, an appropriate rhombic flap was drawn incorporating the defect. The area thus outlined was incised deep to adipose tissue with a #15 scalpel blade. The skin margins were undermined to an appropriate distance in all directions utilizing iris scissors. Following this, the designed flap was carried over into the primary defect and sutured into place.
Rhomboid Transposition Flap Text: The defect edges were debeveled with a #15 scalpel blade. Given the location of the defect and the proximity to free margins a rhomboid transposition flap was deemed most appropriate. Using a sterile surgical marker, an appropriate rhomboid flap was drawn incorporating the defect. The area thus outlined was incised deep to adipose tissue with a #15 scalpel blade. The skin margins were undermined to an appropriate distance in all directions utilizing iris scissors. Following this, the designed flap was carried over into the primary defect and sutured into place.
Rotation Flap Text: The defect edges were debeveled with a #15 scalpel blade. Given the location of the defect, shape of the defect and the proximity to free margins a rotation flap was deemed most appropriate. Using a sterile surgical marker, an appropriate rotation flap was drawn incorporating the defect and placing the expected incisions within the relaxed skin tension lines where possible. The area thus outlined was incised deep to adipose tissue with a #15 scalpel blade. The skin margins were undermined to an appropriate distance in all directions utilizing iris scissors. Following this, the designed flap was carried over into the primary defect and sutured into place.
Spiral Flap Text: The defect edges were debeveled with a #15 scalpel blade. Given the location of the defect, shape of the defect and the proximity to free margins a spiral flap was deemed most appropriate. Using a sterile surgical marker, an appropriate rotation flap was drawn incorporating the defect and placing the expected incisions within the relaxed skin tension lines where possible. The area thus outlined was incised deep to adipose tissue with a #15 scalpel blade. The skin margins were undermined to an appropriate distance in all directions utilizing iris scissors. Following this, the designed flap was carried over into the primary defect and sutured into place.
Staged Advancement Flap Text: The defect edges were debeveled with a #15 scalpel blade. Given the location of the defect, shape of the defect and the proximity to free margins a staged advancement flap was deemed most appropriate. Using a sterile surgical marker, an appropriate advancement flap was drawn incorporating the defect and placing the expected incisions within the relaxed skin tension lines where possible. The area thus outlined was incised deep to adipose tissue with a #15 scalpel blade. The skin margins were undermined to an appropriate distance in all directions utilizing iris scissors. Following this, the designed flap was carried over into the primary defect and sutured into place.
Star Wedge Flap Text: The defect edges were debeveled with a #15 scalpel blade. Given the location of the defect, shape of the defect and the proximity to free margins a star wedge flap was deemed most appropriate. Using a sterile surgical marker, an appropriate rotation flap was drawn incorporating the defect and placing the expected incisions within the relaxed skin tension lines where possible. The area thus outlined was incised deep to adipose tissue with a #15 scalpel blade. The skin margins were undermined to an appropriate distance in all directions utilizing iris scissors. Following this, the designed flap was carried over into the primary defect and sutured into place.
Transposition Flap Text: The defect edges were debeveled with a #15 scalpel blade. Given the location of the defect and the proximity to free margins a transposition flap was deemed most appropriate. Using a sterile surgical marker, an appropriate transposition flap was drawn incorporating the defect. The area thus outlined was incised deep to adipose tissue with a #15 scalpel blade. The skin margins were undermined to an appropriate distance in all directions utilizing iris scissors. Following this, the designed flap was carried over into the primary defect and sutured into place.
Trilobed Flap Text: The defect edges were debeveled with a #15 scalpel blade. Given the location of the defect and the proximity to free margins a trilobed flap was deemed most appropriate. Using a sterile surgical marker, an appropriate trilobed flap was drawn around the defect. The area thus outlined was incised deep to adipose tissue with a #15 scalpel blade. The skin margins were undermined to an appropriate distance in all directions utilizing iris scissors. Following this, the designed flap was carried into the primary defect and sutured into place.
V-Y Flap Text: The defect edges were debeveled with a #15 scalpel blade. Given the location of the defect, shape of the defect and the proximity to free margins a V-Y flap was deemed most appropriate. Using a sterile surgical marker, an appropriate advancement flap was drawn incorporating the defect and placing the expected incisions within the relaxed skin tension lines where possible. The area thus outlined was incised deep to adipose tissue with a #15 scalpel blade. The skin margins were undermined to an appropriate distance in all directions utilizing iris scissors. Following this, the designed flap was advanced and carried over into the primary defect and sutured into place.
V-Y Plasty Text: The defect edges were debeveled with a #15 scalpel blade. Given the location of the defect, shape of the defect and the proximity to free margins an V-Y advancement flap was deemed most appropriate. Using a sterile surgical marker, an appropriate advancement flap was drawn incorporating the defect and placing the expected incisions within the relaxed skin tension lines where possible. The area thus outlined was incised deep to adipose tissue with a #15 scalpel blade. The skin margins were undermined to an appropriate distance in all directions utilizing iris scissors. Following this, the designed flap was advanced and carried over into the primary defect and sutured into place.
W Plasty Text: The lesion was extirpated to the level of the fat with a #15 scalpel blade. Given the location of the defect, shape of the defect and the proximity to free margins a W-plasty was deemed most appropriate for repair. Using a sterile surgical marker, the appropriate transposition arms of the W-plasty were drawn incorporating the defect and placing the expected incisions within the relaxed skin tension lines where possible. The area thus outlined was incised deep to adipose tissue with a #15 scalpel blade. The skin margins were undermined to an appropriate distance in all directions utilizing iris scissors. The opposing transposition arms were then transposed and carried over into place in opposite direction and anchored with interrupted buried subcutaneous sutures.
Z Plasty Text: The lesion was extirpated to the level of the fat with a #15 scalpel blade. Given the location of the defect, shape of the defect and the proximity to free margins a Z-plasty was deemed most appropriate for repair. Using a sterile surgical marker, the appropriate transposition arms of the Z-plasty were drawn incorporating the defect and placing the expected incisions within the relaxed skin tension lines where possible. The area thus outlined was incised deep to adipose tissue with a #15 scalpel blade. The skin margins were undermined to an appropriate distance in all directions utilizing iris scissors. The opposing transposition arms were then transposed and carried over into place in opposite direction and anchored with interrupted buried subcutaneous sutures.
Zygomaticofacial Flap Text: Given the location of the defect, shape of the defect and the proximity to free margins a zygomaticofacial flap was deemed most appropriate for repair. Using a sterile surgical marker, the appropriate flap was drawn incorporating the defect and placing the expected incisions within the relaxed skin tension lines where possible. The area thus outlined was incised deep to adipose tissue with a #15 scalpel blade with preservation of a vascular pedicle.  The skin margins were undermined to an appropriate distance in all directions utilizing iris scissors. The flap was then carried over into the defect and anchored with interrupted buried subcutaneous sutures.
Abbe Flap (Lower To Upper Lip) Text: The defect of the upper lip was assessed and measured.  Given the location and size of the defect, an Abbe flap was deemed most appropriate. Using a sterile surgical marker, an appropriate Abbe flap was measured and drawn on the lower lip. Local anesthesia was then infiltrated. A scalpel was then used to incise the upper lip through and through the skin, vermilion, muscle and mucosa, leaving the flap pedicled on the opposite side.  The flap was then rotated and transferred to the lower lip defect.  The flap was then sutured into place with a three layer technique, closing the orbicularis oris muscle layer with subcutaneous buried sutures, followed by a mucosal layer and an epidermal layer.
Abbe Flap (Upper To Lower Lip) Text: The defect of the lower lip was assessed and measured.  Given the location and size of the defect, an Abbe flap was deemed most appropriate. Using a sterile surgical marker, an appropriate Abbe flap was measured and drawn on the upper lip. Local anesthesia was then infiltrated.  A scalpel was then used to incise the upper lip through and through the skin, vermilion, muscle and mucosa, leaving the flap pedicled on the opposite side.  The flap was then rotated and transferred to the lower lip defect.  The flap was then sutured into place with a three layer technique, closing the orbicularis oris muscle layer with subcutaneous buried sutures, followed by a mucosal layer and an epidermal layer.
Cheek Interpolation Flap Text: A decision was made to reconstruct the defect utilizing an interpolation axial flap and a staged reconstruction.  A telfa template was made of the defect.  This telfa template was then used to outline the Cheek Interpolation flap.  The donor area for the pedicle flap was then injected with anesthesia.  The flap was excised through the skin and subcutaneous tissue down to the layer of the underlying musculature.  The interpolation flap was carefully excised within this deep plane to maintain its blood supply.  The edges of the donor site were undermined.   The donor site was closed in a primary fashion.  The pedicle was then rotated into position and sutured.  Once the tube was sutured into place, adequate blood supply was confirmed with blanching and refill.  The pedicle was then wrapped with xeroform gauze and dressed appropriately with a telfa and gauze bandage to ensure continued blood supply and protect the attached pedicle.
Cheek-To-Nose Interpolation Flap Text: A decision was made to reconstruct the defect utilizing an interpolation axial flap and a staged reconstruction.  A telfa template was made of the defect.  This telfa template was then used to outline the Cheek-To-Nose Interpolation flap.  The donor area for the pedicle flap was then injected with anesthesia.  The flap was excised through the skin and subcutaneous tissue down to the layer of the underlying musculature.  The interpolation flap was carefully excised within this deep plane to maintain its blood supply.  The edges of the donor site were undermined.   The donor site was closed in a primary fashion.  The pedicle was then rotated into position and sutured.  Once the tube was sutured into place, adequate blood supply was confirmed with blanching and refill.  The pedicle was then wrapped with xeroform gauze and dressed appropriately with a telfa and gauze bandage to ensure continued blood supply and protect the attached pedicle.
Estlander Flap (Lower To Upper Lip) Text: The defect of the lower lip was assessed and measured.  Given the location and size of the defect, an Estlander flap was deemed most appropriate. Using a sterile surgical marker, an appropriate Estlander flap was measured and drawn on the upper lip. Local anesthesia was then infiltrated. A scalpel was then used to incise the lateral aspect of the flap, through skin, muscle and mucosa, leaving the flap pedicled medially.  The flap was then rotated and positioned to fill the lower lip defect.  The flap was then sutured into place with a three layer technique, closing the orbicularis oris muscle layer with subcutaneous buried sutures, followed by a mucosal layer and an epidermal layer.
Interpolation Flap Text: A decision was made to reconstruct the defect utilizing an interpolation axial flap and a staged reconstruction.  A telfa template was made of the defect.  This telfa template was then used to outline the interpolation flap.  The donor area for the pedicle flap was then injected with anesthesia.  The flap was excised through the skin and subcutaneous tissue down to the layer of the underlying musculature.  The interpolation flap was carefully excised within this deep plane to maintain its blood supply.  The edges of the donor site were undermined.   The donor site was closed in a primary fashion.  The pedicle was then rotated into position and sutured.  Once the tube was sutured into place, adequate blood supply was confirmed with blanching and refill.  The pedicle was then wrapped with xeroform gauze and dressed appropriately with a telfa and gauze bandage to ensure continued blood supply and protect the attached pedicle.
Melolabial Interpolation Flap Text: A decision was made to reconstruct the defect utilizing an interpolation axial flap and a staged reconstruction.  A telfa template was made of the defect.  This telfa template was then used to outline the melolabial interpolation flap.  The donor area for the pedicle flap was then injected with anesthesia.  The flap was excised through the skin and subcutaneous tissue down to the layer of the underlying musculature.  The pedicle flap was carefully excised within this deep plane to maintain its blood supply.  The edges of the donor site were undermined.   The donor site was closed in a primary fashion.  The pedicle was then rotated into position and sutured.  Once the tube was sutured into place, adequate blood supply was confirmed with blanching and refill.  The pedicle was then wrapped with xeroform gauze and dressed appropriately with a telfa and gauze bandage to ensure continued blood supply and protect the attached pedicle.
Mastoid Interpolation Flap Text: A decision was made to reconstruct the defect utilizing an interpolation axial flap and a staged reconstruction.  A telfa template was made of the defect.  This telfa template was then used to outline the mastoid interpolation flap.  The donor area for the pedicle flap was then injected with anesthesia.  The flap was excised through the skin and subcutaneous tissue down to the layer of the underlying musculature.  The pedicle flap was carefully excised within this deep plane to maintain its blood supply.  The edges of the donor site were undermined.   The donor site was closed in a primary fashion.  The pedicle was then rotated into position and sutured.  Once the tube was sutured into place, adequate blood supply was confirmed with blanching and refill.  The pedicle was then wrapped with xeroform gauze and dressed appropriately with a telfa and gauze bandage to ensure continued blood supply and protect the attached pedicle.
Paramedian Forehead Flap Text: A decision was made to reconstruct the defect utilizing an interpolation axial flap and a staged reconstruction.  A telfa template was made of the defect.  This telfa template was then used to outline the paramedian forehead pedicle flap.  The donor area for the pedicle flap was then injected with anesthesia.  The flap was excised through the skin and subcutaneous tissue down to the layer of the underlying musculature.  The pedicle flap was carefully excised within this deep plane to maintain its blood supply.  The edges of the donor site were undermined.   The donor site was closed in a primary fashion.  The pedicle was then rotated into position and sutured.  Once the tube was sutured into place, adequate blood supply was confirmed with blanching and refill.  The pedicle was then wrapped with xeroform gauze and dressed appropriately with a telfa and gauze bandage to ensure continued blood supply and protect the attached pedicle.
Posterior Auricular Interpolation Flap Text: A decision was made to reconstruct the defect utilizing an interpolation axial flap and a staged reconstruction.  A telfa template was made of the defect.  This telfa template was then used to outline the posterior auricular interpolation flap.  The donor area for the pedicle flap was then injected with anesthesia.  The flap was excised through the skin and subcutaneous tissue down to the layer of the underlying musculature.  The pedicle flap was carefully excised within this deep plane to maintain its blood supply.  The edges of the donor site were undermined.   The donor site was closed in a primary fashion.  The pedicle was then rotated into position and sutured.  Once the tube was sutured into place, adequate blood supply was confirmed with blanching and refill.  The pedicle was then wrapped with xeroform gauze and dressed appropriately with a telfa and gauze bandage to ensure continued blood supply and protect the attached pedicle.
Cheiloplasty (Less Than 50%) Text: A decision was made to reconstruct the defect with a  cheiloplasty.  The defect was undermined extensively.  Additional orbicularis oris muscle was excised with a 15 blade scalpel.  The defect was converted into a full thickness wedge, of less than 50% of the vertical height of the lip, to facilite a better cosmetic result.  Small vessels were then tied off with 5-0 monocyrl. The orbicularis oris, superficial fascia, adipose and dermis were then reapproximated.  After the deeper layers were approximated the epidermis was reapproximated with particular care given to realign the vermilion border.
Cheiloplasty (Complex) Text: A decision was made to reconstruct the defect with a  cheiloplasty.  The defect was undermined extensively.  Additional orbicularis oris muscle was excised with a 15 blade scalpel.  The defect was converted into a full thickness wedge to facilite a better cosmetic result.  Small vessels were then tied off with 5-0 monocyrl. The orbicularis oris, superficial fascia, adipose and dermis were then reapproximated.  After the deeper layers were approximated the epidermis was reapproximated with particular care given to realign the vermilion border.
Ear Wedge Repair Text: A wedge excision was completed by carrying down an excision through the full thickness of the ear and cartilage with an inward facing Burow's triangle. The wound was then closed in a layered fashion.
Full Thickness Lip Wedge Repair (Flap) Text: Given the location of the defect and the proximity to free margins a full thickness wedge repair was deemed most appropriate. Using a sterile surgical marker, the appropriate repair was drawn incorporating the defect and placing the expected incisions perpendicular to the vermilion border.  The vermilion border was also meticulously outlined to ensure appropriate reapproximation during the repair.  The area thus outlined was incised through and through with a #15 scalpel blade.  The muscularis and dermis were reaproximated with deep sutures following hemostasis. Care was taken to realign the vermilion border before proceeding with the superficial closure.  Once the vermilion was realigned the superfical and mucosal closure was finished.
Ftsg Text: The defect edges were debeveled with a #15 scalpel blade. Given the location of the defect, shape of the defect and the proximity to free margins a full thickness skin graft was deemed most appropriate. Using a sterile surgical marker, the primary defect shape was transferred to the donor site. The area thus outlined was incised deep to adipose tissue with a #15 scalpel blade.  The harvested graft was then trimmed of adipose tissue until only dermis and epidermis was left.  The skin graft was then placed in the primary defect and oriented appropriately.
Split-Thickness Skin Graft Text: The defect edges were debeveled with a #15 scalpel blade. Given the location of the defect, shape of the defect and the proximity to free margins a split thickness skin graft was deemed most appropriate. Using a sterile surgical marker, the primary defect shape was transferred to the donor site. The split thickness graft was then harvested.  The skin graft was then placed in the primary defect and oriented appropriately.
Pinch Graft Text: The defect edges were debeveled with a #15 scalpel blade. Given the location of the defect, shape of the defect and the proximity to free margins a pinch graft was deemed most appropriate. Using a sterile surgical marker, the primary defect shape was transferred to the donor site. The area thus outlined was incised deep to adipose tissue with a #15 scalpel blade.  The harvested graft was then trimmed of adipose tissue until only dermis and epidermis was left. The skin graft was then placed in the primary defect and oriented appropriately.
Burow's Graft Text: The defect edges were debeveled with a #15 scalpel blade. Given the location of the defect, shape of the defect, the proximity to free margins and the presence of a standing cone deformity a Burow's skin graft was deemed most appropriate. The standing cone was removed and this tissue was then trimmed to the shape of the primary defect. The adipose tissue was also removed until only dermis and epidermis were left.  The skin graft was then placed in the primary defect and oriented appropriately.
Cartilage Graft Text: The defect edges were debeveled with a #15 scalpel blade. Given the location of the defect, shape of the defect, the fact the defect involved a full thickness cartilage defect a cartilage graft was deemed most appropriate.  An appropriate donor site was identified, cleansed, and anesthetized. The cartilage graft was then harvested and transferred to the recipient site, oriented appropriately and then sutured into place.  The secondary defect was then repaired using a primary closure.
Composite Graft Text: The defect edges were debeveled with a #15 scalpel blade. Given the location of the defect, shape of the defect, the proximity to free margins and the fact the defect was full thickness a composite graft was deemed most appropriate.  The defect was outline and then transferred to the donor site.  A full thickness graft was then excised from the donor site. The graft was then placed in the primary defect, oriented appropriately and then sutured into place.  The secondary defect was then repaired using a primary closure.
Epidermal Autograft Text: The defect edges were debeveled with a #15 scalpel blade. Given the location of the defect, shape of the defect and the proximity to free margins an epidermal autograft was deemed most appropriate. Using a sterile surgical marker, the primary defect shape was transferred to the donor site. The epidermal graft was then harvested.  The skin graft was then placed in the primary defect and oriented appropriately.
Dermal Autograft Text: The defect edges were debeveled with a #15 scalpel blade. Given the location of the defect, shape of the defect and the proximity to free margins a dermal autograft was deemed most appropriate. Using a sterile surgical marker, the primary defect shape was transferred to the donor site. The area thus outlined was incised deep to adipose tissue with a #15 scalpel blade.  The harvested graft was then trimmed of adipose and epidermal tissue until only dermis was left.  The skin graft was then placed in the primary defect and oriented appropriately.
Skin Substitute Text: The defect edges were debeveled with a #15 scalpel blade. Given the location of the defect, shape of the defect and the proximity to free margins a skin substitute graft was deemed most appropriate.  The graft material was trimmed to fit the size of the defect. The graft was then placed in the primary defect and oriented appropriately.
Skin Substitute Paste Text: The defect edges were debeveled with a #15 scalpel blade. Given the location of the defect, shape of the defect and the proximity to free margins a skin substitute micronized graft was deemed most appropriate.  The entire vial contents were admixed with 0.5ccs of sterile saline, formed into a paste and then evenly spread over the entire wound bed.
Skin Substitute Injection Text: The defect edges were debeveled with a #15 scalpel blade. Given the location of the defect, shape of the defect and the proximity to free margins a skin substitute micronized graft was deemed most appropriate.  The entire vial contents were admixed with 3.0ccs of sterile saline and then injected subcutaneously throughout the entire wound bed.
Tissue Cultured Epidermal Autograft Text: The defect edges were debeveled with a #15 scalpel blade. Given the location of the defect, shape of the defect and the proximity to free margins a tissue cultured epidermal autograft was deemed most appropriate.  The graft was then trimmed to fit the size of the defect.  The graft was then placed in the primary defect and oriented appropriately.
Xenograft Text: The defect edges were debeveled with a #15 scalpel blade. Given the location of the defect, shape of the defect and the proximity to free margins a xenograft was deemed most appropriate.  The graft was then trimmed to fit the size of the defect.  The graft was then placed in the primary defect and oriented appropriately.
Purse String (Simple) Text: Given the location of the defect and the characteristics of the surrounding skin a purse string closure was deemed most appropriate.  Undermining was performed circumferentially around the surgical defect.  A purse string suture was then placed and tightened.
Purse String (Intermediate) Text: Given the location of the defect and the characteristics of the surrounding skin a purse string intermediate closure was deemed most appropriate.  Undermining was performed circumferentially around the surgical defect.  A purse string suture was then placed and tightened.
Partial Purse String (Simple) Text: Given the location of the defect and the characteristics of the surrounding skin a simple purse string closure was deemed most appropriate.  Undermining was performed circumferentially around the surgical defect.  A purse string suture was then placed and tightened. Wound tension only allowed a partial closure of the circular defect.
Partial Purse String (Intermediate) Text: Given the location of the defect and the characteristics of the surrounding skin an intermediate purse string closure was deemed most appropriate.  Undermining was performed circumferentially around the surgical defect.  A purse string suture was then placed and tightened. Wound tension only allowed a partial closure of the circular defect.
Localized Dermabrasion With Wire Brush Text: The patient was draped in routine manner.  Localized dermabrasion using 3 x 17 mm wire brush was performed in routine manner to papillary dermis. This spot dermabrasion is being performed to complete skin cancer reconstruction. It also will eliminate the other sun damaged precancerous cells that are known to be part of the regional effect of a lifetime's worth of sun exposure. This localized dermabrasion is therapeutic and should not be considered cosmetic in any regard.
Localized Dermabrasion With Sand Papertext: The patient was draped in routine manner.  Localized dermabrasion using sterile sand paper was performed in routine manner to papillary dermis. This spot dermabrasion is being performed to complete skin cancer reconstruction. It also will eliminate the other sun damaged precancerous cells that are known to be part of the regional effect of a lifetime's worth of sun exposure. This localized dermabrasion is therapeutic and should not be considered cosmetic in any regard.
Localized Dermabrasion With 15 Blade Text: The patient was draped in routine manner.  Localized dermabrasion using a 15 blade was performed in routine manner to papillary dermis. This spot dermabrasion is being performed to complete skin cancer reconstruction. It also will eliminate the other sun damaged precancerous cells that are known to be part of the regional effect of a lifetime's worth of sun exposure. This localized dermabrasion is therapeutic and should not be considered cosmetic in any regard.
Tarsorrhaphy Text: A tarsorrhaphy was performed using Frost sutures.
Intermediate Repair And Flap Additional Text (Will Appearing After The Standard Complex Repair Text): The intermediate repair was not sufficient to completely close the primary defect. The remaining additional defect was repaired with the flap mentioned below.
Intermediate Repair And Graft Additional Text (Will Appearing After The Standard Complex Repair Text): The intermediate repair was not sufficient to completely close the primary defect. The remaining additional defect was repaired with the graft mentioned below.
Complex Repair And Flap Additional Text (Will Appearing After The Standard Complex Repair Text): The complex repair was not sufficient to completely close the primary defect. The remaining additional defect was repaired with the flap mentioned below.
Complex Repair And Graft Additional Text (Will Appearing After The Standard Complex Repair Text): The complex repair was not sufficient to completely close the primary defect. The remaining additional defect was repaired with the graft mentioned below.
Eyelid Full Thickness Repair - 79975: The eyelid defect was full thickness which required a wedge repair of the eyelid. Special care was taken to ensure that the eyelid margin was realligned when placing sutures.
Eyelid Partial Thickness Repair - 36063: The eyelid defect was partial thickness which required a wedge repair of the eyelid. Special care was taken to ensure that the eyelid margin was realligned when placing sutures.
Cheek Interpolation Flap Division And Inset Text: Division and inset of the cheek interpolation flap was performed to achieve optimal aesthetic result, restore normal anatomic appearance and avoid distortion of normal anatomy, expedite and facilitate wound healing, achieve optimal functional result and because linear closure either not possible or would produce suboptimal result. The patient was prepped and draped in the usual manner. The pedicle was infiltrated with local anesthesia. The pedicle was sectioned with a #15 blade. The pedicle was de-bulked and trimmed to match the shape of the defect. Hemostasis was achieved. The flap donor site and free margin of the flap were secured with deep buried sutures and the wound edges were re-approximated.
Cheek To Nose Interpolation Flap Division And Inset Text: Division and inset of the cheek to nose interpolation flap was performed to achieve optimal aesthetic result, restore normal anatomic appearance and avoid distortion of normal anatomy, expedite and facilitate wound healing, achieve optimal functional result and because linear closure either not possible or would produce suboptimal result. The patient was prepped and draped in the usual manner. The pedicle was infiltrated with local anesthesia. The pedicle was sectioned with a #15 blade. The pedicle was de-bulked and trimmed to match the shape of the defect. Hemostasis was achieved. The flap donor site and free margin of the flap were secured with deep buried sutures and the wound edges were re-approximated.
Melolabial Interpolation Flap Division And Inset Text: Division and inset of the melolabial interpolation flap was performed to achieve optimal aesthetic result, restore normal anatomic appearance and avoid distortion of normal anatomy, expedite and facilitate wound healing, achieve optimal functional result and because linear closure either not possible or would produce suboptimal result. The patient was prepped and draped in the usual manner. The pedicle was infiltrated with local anesthesia. The pedicle was sectioned with a #15 blade. The pedicle was de-bulked and trimmed to match the shape of the defect. Hemostasis was achieved. The flap donor site and free margin of the flap were secured with deep buried sutures and the wound edges were re-approximated.
Mastoid Interpolation Flap Division And Inset Text: Division and inset of the mastoid interpolation flap was performed to achieve optimal aesthetic result, restore normal anatomic appearance and avoid distortion of normal anatomy, expedite and facilitate wound healing, achieve optimal functional result and because linear closure either not possible or would produce suboptimal result. The patient was prepped and draped in the usual manner. The pedicle was infiltrated with local anesthesia. The pedicle was sectioned with a #15 blade. The pedicle was de-bulked and trimmed to match the shape of the defect. Hemostasis was achieved. The flap donor site and free margin of the flap were secured with deep buried sutures and the wound edges were re-approximated.
Paramedian Forehead Flap Division And Inset Text: Division and inset of the paramedian forehead flap was performed to achieve optimal aesthetic result, restore normal anatomic appearance and avoid distortion of normal anatomy, expedite and facilitate wound healing, achieve optimal functional result and because linear closure either not possible or would produce suboptimal result. The patient was prepped and draped in the usual manner. The pedicle was infiltrated with local anesthesia. The pedicle was sectioned with a #15 blade. The pedicle was de-bulked and trimmed to match the shape of the defect. Hemostasis was achieved. The flap donor site and free margin of the flap were secured with deep buried sutures and the wound edges were re-approximated.
Posterior Auricular Interpolation Flap Division And Inset Text: Division and inset of the posterior auricular interpolation flap was performed to achieve optimal aesthetic result, restore normal anatomic appearance and avoid distortion of normal anatomy, expedite and facilitate wound healing, achieve optimal functional result and because linear closure either not possible or would produce suboptimal result. The patient was prepped and draped in the usual manner. The pedicle was infiltrated with local anesthesia. The pedicle was sectioned with a #15 blade. The pedicle was de-bulked and trimmed to match the shape of the defect. Hemostasis was achieved. The flap donor site and free margin of the flap were secured with deep buried sutures and the wound edges were re-approximated.
Manual Repair Warning Statement: We plan on removing the manually selected variable below in favor of our much easier automatic structured text blocks found in the previous tab. We decided to do this to help make the flow better and give you the full power of structured data. Manual selection is never going to be ideal in our platform and I would encourage you to avoid using manual selection from this point on, especially since I will be sunsetting this feature. It is important that you do one of two things with the customized text below. First, you can save all of the text in a word file so you can have it for future reference. Second, transfer the text to the appropriate area in the Library tab. Lastly, if there is a flap or graft type which we do not have you need to let us know right away so I can add it in before the variable is hidden. No need to panic, we plan to give you roughly 6 months to make the change.
Consent: The rationale for the repair was explained to the patient and consent was obtained. The risks, benefits and alternatives to therapy were discussed in detail. Specifically, the risks of infection, scarring, bleeding, prolonged wound healing, incomplete removal, allergy to anesthesia, nerve injury and recurrence were addressed. Prior to the procedure, the treatment site was clearly identified and confirmed by the patient. All components of Universal Protocol/PAUSE Rule completed.
Post-Care Instructions: I reviewed with the patient in detail post-care instructions. Patient is not to engage in any heavy lifting, exercise, or swimming for the next 14 days. Should the patient develop any fevers, chills, bleeding, severe pain patient will contact the office immediately.
Pain Refusal Text: I offered to prescribe pain medication but the patient refused to take this medication.
Where Do You Want The Question To Include Opioid Counseling Located?: Case Summary Tab
Information: Selecting Yes will display possible errors in your note based on the variables you have selected. This validation is only offered as a suggestion for you. PLEASE NOTE THAT THE VALIDATION TEXT WILL BE REMOVED WHEN YOU FINALIZE YOUR NOTE. IF YOU WANT TO FAX A PRELIMINARY NOTE YOU WILL NEED TO TOGGLE THIS TO 'NO' IF YOU DO NOT WANT IT IN YOUR FAXED NOTE.

## 2025-04-23 ENCOUNTER — APPOINTMENT (OUTPATIENT)
Dept: BONE DENSITY | Facility: HOSPITAL | Age: 75
End: 2025-04-23
Payer: MEDICARE

## 2025-04-23 DIAGNOSIS — Z78.0 POSTMENOPAUSAL: ICD-10-CM

## 2025-04-23 DIAGNOSIS — M81.0 AGE-RELATED OSTEOPOROSIS WITHOUT CURRENT PATHOLOGICAL FRACTURE: ICD-10-CM

## 2025-04-23 PROCEDURE — 77080 DXA BONE DENSITY AXIAL: CPT

## 2025-04-29 ENCOUNTER — HOSPITAL ENCOUNTER (EMERGENCY)
Facility: HOSPITAL | Age: 75
Discharge: HOME | End: 2025-04-29
Attending: STUDENT IN AN ORGANIZED HEALTH CARE EDUCATION/TRAINING PROGRAM
Payer: MEDICARE

## 2025-04-29 ENCOUNTER — APPOINTMENT (OUTPATIENT)
Dept: GENERAL RADIOLOGY | Facility: HOSPITAL | Age: 75
End: 2025-04-29
Payer: MEDICARE

## 2025-04-29 VITALS
SYSTOLIC BLOOD PRESSURE: 131 MMHG | RESPIRATION RATE: 17 BRPM | HEART RATE: 65 BPM | DIASTOLIC BLOOD PRESSURE: 65 MMHG | WEIGHT: 168 LBS | HEIGHT: 63 IN | BODY MASS INDEX: 29.77 KG/M2 | TEMPERATURE: 98.3 F | OXYGEN SATURATION: 94 %

## 2025-04-29 DIAGNOSIS — M47.814 OSTEOARTHRITIS OF THORACIC SPINE, UNSPECIFIED SPINAL OSTEOARTHRITIS COMPLICATION STATUS: ICD-10-CM

## 2025-04-29 DIAGNOSIS — R07.9 CHEST PAIN, UNSPECIFIED TYPE: Primary | ICD-10-CM

## 2025-04-29 LAB
ALBUMIN SERPL-MCNC: 4.1 G/DL (ref 3.5–5.2)
ALBUMIN/GLOB SERPL: 1.6 G/DL
ALP SERPL-CCNC: 69 U/L (ref 39–117)
ALT SERPL W P-5'-P-CCNC: 16 U/L (ref 1–33)
ANION GAP SERPL CALCULATED.3IONS-SCNC: 12.6 MMOL/L (ref 5–15)
AST SERPL-CCNC: 24 U/L (ref 1–32)
BASOPHILS # BLD AUTO: 0.07 10*3/MM3 (ref 0–0.2)
BASOPHILS NFR BLD AUTO: 0.9 % (ref 0–1.5)
BILIRUB SERPL-MCNC: 0.8 MG/DL (ref 0–1.2)
BUN SERPL-MCNC: 10 MG/DL (ref 8–23)
BUN/CREAT SERPL: 11 (ref 7–25)
CALCIUM SPEC-SCNC: 9 MG/DL (ref 8.6–10.5)
CHLORIDE SERPL-SCNC: 102 MMOL/L (ref 98–107)
CO2 SERPL-SCNC: 22.4 MMOL/L (ref 22–29)
CREAT SERPL-MCNC: 0.91 MG/DL (ref 0.57–1)
D DIMER PPP FEU-MCNC: 0.54 MCGFEU/ML (ref 0–0.73)
DEPRECATED RDW RBC AUTO: 44 FL (ref 37–54)
EGFRCR SERPLBLD CKD-EPI 2021: 66.8 ML/MIN/1.73
EOSINOPHIL # BLD AUTO: 0.17 10*3/MM3 (ref 0–0.4)
EOSINOPHIL NFR BLD AUTO: 2.2 % (ref 0.3–6.2)
ERYTHROCYTE [DISTWIDTH] IN BLOOD BY AUTOMATED COUNT: 13.4 % (ref 12.3–15.4)
GEN 5 1HR TROPONIN T REFLEX: 7 NG/L
GLOBULIN UR ELPH-MCNC: 2.6 GM/DL
GLUCOSE SERPL-MCNC: 114 MG/DL (ref 65–99)
HCT VFR BLD AUTO: 36.6 % (ref 34–46.6)
HGB BLD-MCNC: 12 G/DL (ref 12–15.9)
IMM GRANULOCYTES # BLD AUTO: 0.03 10*3/MM3 (ref 0–0.05)
IMM GRANULOCYTES NFR BLD AUTO: 0.4 % (ref 0–0.5)
LIPASE SERPL-CCNC: 38 U/L (ref 13–60)
LYMPHOCYTES # BLD AUTO: 2.2 10*3/MM3 (ref 0.7–3.1)
LYMPHOCYTES NFR BLD AUTO: 28.6 % (ref 19.6–45.3)
MCH RBC QN AUTO: 29.7 PG (ref 26.6–33)
MCHC RBC AUTO-ENTMCNC: 32.8 G/DL (ref 31.5–35.7)
MCV RBC AUTO: 90.6 FL (ref 79–97)
MONOCYTES # BLD AUTO: 0.59 10*3/MM3 (ref 0.1–0.9)
MONOCYTES NFR BLD AUTO: 7.7 % (ref 5–12)
NEUTROPHILS NFR BLD AUTO: 4.62 10*3/MM3 (ref 1.7–7)
NEUTROPHILS NFR BLD AUTO: 60.2 % (ref 42.7–76)
NRBC BLD AUTO-RTO: 0 /100 WBC (ref 0–0.2)
PLATELET # BLD AUTO: 259 10*3/MM3 (ref 140–450)
PMV BLD AUTO: 9.8 FL (ref 6–12)
POTASSIUM SERPL-SCNC: 3.5 MMOL/L (ref 3.5–5.2)
PROT SERPL-MCNC: 6.7 G/DL (ref 6–8.5)
RBC # BLD AUTO: 4.04 10*6/MM3 (ref 3.77–5.28)
SODIUM SERPL-SCNC: 137 MMOL/L (ref 136–145)
TROPONIN T NUMERIC DELTA: NORMAL
TROPONIN T SERPL HS-MCNC: <6 NG/L
WBC NRBC COR # BLD AUTO: 7.68 10*3/MM3 (ref 3.4–10.8)

## 2025-04-29 PROCEDURE — 80053 COMPREHEN METABOLIC PANEL: CPT | Performed by: STUDENT IN AN ORGANIZED HEALTH CARE EDUCATION/TRAINING PROGRAM

## 2025-04-29 PROCEDURE — 71045 X-RAY EXAM CHEST 1 VIEW: CPT

## 2025-04-29 PROCEDURE — 36415 COLL VENOUS BLD VENIPUNCTURE: CPT

## 2025-04-29 PROCEDURE — 83690 ASSAY OF LIPASE: CPT | Performed by: STUDENT IN AN ORGANIZED HEALTH CARE EDUCATION/TRAINING PROGRAM

## 2025-04-29 PROCEDURE — 85379 FIBRIN DEGRADATION QUANT: CPT | Performed by: STUDENT IN AN ORGANIZED HEALTH CARE EDUCATION/TRAINING PROGRAM

## 2025-04-29 PROCEDURE — 93010 ELECTROCARDIOGRAM REPORT: CPT | Performed by: STUDENT IN AN ORGANIZED HEALTH CARE EDUCATION/TRAINING PROGRAM

## 2025-04-29 PROCEDURE — 85025 COMPLETE CBC W/AUTO DIFF WBC: CPT | Performed by: STUDENT IN AN ORGANIZED HEALTH CARE EDUCATION/TRAINING PROGRAM

## 2025-04-29 PROCEDURE — 99284 EMERGENCY DEPT VISIT MOD MDM: CPT | Performed by: STUDENT IN AN ORGANIZED HEALTH CARE EDUCATION/TRAINING PROGRAM

## 2025-04-29 PROCEDURE — 84484 ASSAY OF TROPONIN QUANT: CPT | Performed by: STUDENT IN AN ORGANIZED HEALTH CARE EDUCATION/TRAINING PROGRAM

## 2025-04-29 PROCEDURE — 93005 ELECTROCARDIOGRAM TRACING: CPT | Performed by: STUDENT IN AN ORGANIZED HEALTH CARE EDUCATION/TRAINING PROGRAM

## 2025-04-29 RX ORDER — MELOXICAM 7.5 MG/1
15 TABLET ORAL DAILY
Qty: 14 TABLET | Refills: 0 | Status: SHIPPED | OUTPATIENT
Start: 2025-04-29 | End: 2025-05-06

## 2025-04-29 RX ORDER — GABAPENTIN 100 MG/1
100 CAPSULE ORAL 2 TIMES DAILY
COMMUNITY
Start: 2025-02-01

## 2025-04-29 RX ORDER — ACETAMINOPHEN 500 MG
1000 TABLET ORAL EVERY 6 HOURS PRN
Qty: 30 TABLET | Refills: 0 | Status: SHIPPED | OUTPATIENT
Start: 2025-04-29 | End: 2025-05-06

## 2025-04-29 RX ORDER — METHOCARBAMOL 500 MG/1
500 TABLET, FILM COATED ORAL AS NEEDED
COMMUNITY
Start: 2025-02-01

## 2025-04-29 NOTE — ED PROVIDER NOTES
"Subjective   History of Present Illness  74-year-old female history of coronary artery bypass graft distant, asthma presents to the ER chief complaint of 2 days of intermittent sharp left-sided chest pain worse with inspiration, associated symptoms of 1 month of dry cough, context of thoracic back surgery 2 months ago.  Denies a focus of pain within the back itself, or the abdomen.  Denies fever chills nausea vomiting diarrhea constipation or syncope ROS otherwise negative vitals are within normal limits on exam, ctab in all lung fields, nontender abdomen nontender chest,  2+ pulses in all 4 extremities   Review of Systems    Past Medical History:   Diagnosis Date    Abnormal ECG 3/26/23    Abnormal liver function test     Allergic     Ankle sprain     Anxiety     Arthritis     Asthma     Bursitis of hip     CAD (coronary artery disease)     , s/p CABG, \"-\"PET scan 2011, reversible ischemia on Lexiscan PET perfusion study  Dr.Janet Ocampo    Cancer Skin    Cataract     Cholelithiasis      CT    Chronic UTI     Colon polyp     Depression     Frozen shoulder     GERD (gastroesophageal reflux disease)     Heart disease     Herpes simplex type 1 infection     orolabial    Herpes zoster     2010    Hx of bone scan      11: sp -1.8, hip -0.5, fem neck -0.8 14: sp -1.7, hip -0.6, fem neck -1.0    Hyperlipidemia     Hypertension     Incidental lung nodule, less than or equal to 3mm     CTPET 2016 [place], no f/u needed    Insomnia     Internal hemorrhoids     Myocardial infarction     Neck strain     Obstructive sleep apnea     mild     Osteopenia     Dexa 11 sp -1.8 hip -0.5 fem neck -0.8    Papanicolaou smear     2011    Pregnancy         Rotator cuff tendinitis     2004    Tubular adenoma of colon     , , cecal polyp, 2012, , adenoma with mild dysplasia 4 mm ascending colon       No Known Allergies    Past Surgical History: "   Procedure Laterality Date    AUGMENTATION MAMMAPLASTY      BLADDER SUSPENSION  1990    BREAST IMPLANT SURGERY      BREAST SURGERY  1984    Implants    CARDIAC CATHETERIZATION  1996 & 2020    CARDIAC SURGERY  1996    COLONOSCOPY      03/12  - ascending colon adenoma with mild dysplasia.     COLONOSCOPY  03/2015    Dr Coyle - patient is to follow up in March 2020    CORONARY ARTERY BYPASS GRAFT  1997    COSMETIC SURGERY  1984    EYE SURGERY  2010    Cataract    LAPAROSCOPIC TUBAL LIGATION      ROTATOR CUFF REPAIR Left     TUBAL ABDOMINAL LIGATION  1990       Family History   Problem Relation Age of Onset    Brain cancer Father     Cancer Father         Brain cancer    Osteoporosis Mother     Breast cancer Mother 89        ductal    Arthritis Mother     Cancer Mother         Breast cancer    Hearing loss Mother     Hyperlipidemia Mother     Miscarriages / Stillbirths Mother     Arrhythmia Mother     Diabetes Paternal Grandmother     Heart disease Maternal Aunt     Breast cancer Maternal Aunt     Colon polyps Maternal Aunt     Hyperlipidemia Maternal Aunt     Heart attack Maternal Aunt     Hip fracture Maternal Aunt     Heart disease Maternal Aunt     Hyperlipidemia Maternal Aunt     Heart attack Maternal Aunt     Hyperlipidemia Maternal Aunt     Heart disease Maternal Uncle     Heart disease Maternal Uncle     Heart attack Maternal Uncle     Uterine cancer Neg Hx     Colon cancer Neg Hx     Ovarian cancer Neg Hx        Social History     Socioeconomic History    Marital status:    Tobacco Use    Smoking status: Never     Passive exposure: Never    Smokeless tobacco: Never   Vaping Use    Vaping status: Never Used   Substance and Sexual Activity    Alcohol use: Never    Drug use: Never    Sexual activity: Yes     Partners: Male     Birth control/protection: Tubal ligation, Surgical           Objective   Physical Exam    Procedures         EKG interpretation  Interpreted by: Huber Crouch MD    Date: 04/29/2025  Time:  1721  Rate: 68 BPM  Rhythm: NSR  Impression: -STEMI  ED Course                  HEART Score: 4   Shared Decision Making  I discussed the findings with the patient/patient representative who is in agreement with the treatment plan and the final disposition.  Risks and benefits of discharge and/or observation/admission were discussed: Yes                                      Medical Decision Making  Problems Addressed:  Chest pain, unspecified type: complicated acute illness or injury  Osteoarthritis of thoracic spine, unspecified spinal osteoarthritis complication status: complicated acute illness or injury    Amount and/or Complexity of Data Reviewed  Labs: ordered.  Radiology: ordered.  ECG/medicine tests: ordered.    Risk  OTC drugs.  Prescription drug management.      74-year-old female history of coronary artery bypass graft distant, asthma presents to the ER chief complaint of 2 days of intermittent sharp left-sided chest pain worse with inspiration, associated symptoms of 1 month of dry cough, context of thoracic back surgery 2 months ago.  Denies a focus of pain within the back itself, or the abdomen.  Denies fever chills nausea vomiting diarrhea constipation or syncope ROS otherwise negative vitals are within normal limits on exam, ctab in all lung fields, nontender abdomen nontender chest,  2+ pulses in all 4 extremities     Concern for pna; consideration to PE given description of pain with recent surgery, but low risk can use d-dimer. Eval with abd/card labs, EKG, d-dimer, cxr vs CTA chest pe protocol    D-dimer negative, perc score PE ruled out.    Trop is normal.    EKG is NSR.    Cxr shows some mild atelectasis no evidence of pna    Dc with pain ctrl, cardio fu, ortho spine fu, return precautions.  Hst pt  Dsp home  Final diagnoses:   Chest pain, unspecified type   Osteoarthritis of thoracic spine, unspecified spinal osteoarthritis complication status       ED Disposition  ED  Disposition       ED Disposition   Discharge    Condition   Stable    Comment   --               Tracee Curran MD  [address] [facility] [address]  [place] [id number] [phone]    In 2 days      Taylor Rodríguez MD  [address]  [facility] [address]  [place] [id number] [phone]    Schedule an appointment as soon as possible for a visit in 2 days           Medication List        New Prescriptions      acetaminophen 500 MG tablet  Commonly known as: TYLENOL  Take 2 tablets by mouth Every 6 (Six) Hours As Needed for Mild Pain for up to 7 days.     meloxicam 7.5 MG tablet  Commonly known as: MOBIC  Take 2 tablets by mouth Daily for 7 days.               Where to Get Your Medications        These medications were sent to [id number] - [place] - [address] - [phone]  - [phone] FX  [address] [place] [address]      Phone: [phone]   acetaminophen 500 MG tablet  meloxicam 7.5 MG tablet            Julian Lu MD  04/29/25 2047

## 2025-04-30 LAB
QT INTERVAL: 415 MS
QTC INTERVAL: 443 MS

## 2025-05-07 ENCOUNTER — OFFICE VISIT (OUTPATIENT)
Dept: INTERNAL MEDICINE | Facility: CLINIC | Age: 75
End: 2025-05-07
Payer: MEDICARE

## 2025-05-07 VITALS
BODY MASS INDEX: 28.7 KG/M2 | HEART RATE: 71 BPM | SYSTOLIC BLOOD PRESSURE: 130 MMHG | OXYGEN SATURATION: 98 % | TEMPERATURE: 98.2 F | DIASTOLIC BLOOD PRESSURE: 58 MMHG | WEIGHT: 162 LBS | HEIGHT: 63 IN

## 2025-05-07 DIAGNOSIS — B00.1 HERPES LABIALIS: ICD-10-CM

## 2025-05-07 DIAGNOSIS — M48.54XD NON-TRAUMATIC COMPRESSION FRACTURE OF T12 THORACIC VERTEBRA WITH ROUTINE HEALING, SUBSEQUENT ENCOUNTER: ICD-10-CM

## 2025-05-07 DIAGNOSIS — F32.9 REACTIVE DEPRESSION: ICD-10-CM

## 2025-05-07 DIAGNOSIS — Z00.00 ROUTINE HEALTH MAINTENANCE: ICD-10-CM

## 2025-05-07 DIAGNOSIS — E55.9 HYPOVITAMINOSIS D: ICD-10-CM

## 2025-05-07 DIAGNOSIS — G47.00 INSOMNIA, UNSPECIFIED TYPE: ICD-10-CM

## 2025-05-07 DIAGNOSIS — Z86.0101 HISTORY OF ADENOMATOUS POLYP OF COLON: ICD-10-CM

## 2025-05-07 DIAGNOSIS — M81.0 AGE-RELATED OSTEOPOROSIS WITHOUT CURRENT PATHOLOGICAL FRACTURE: ICD-10-CM

## 2025-05-07 DIAGNOSIS — J30.89 NON-SEASONAL ALLERGIC RHINITIS, UNSPECIFIED TRIGGER: ICD-10-CM

## 2025-05-07 DIAGNOSIS — G47.33 OSA ON CPAP: ICD-10-CM

## 2025-05-07 DIAGNOSIS — R73.03 PREDIABETES: ICD-10-CM

## 2025-05-07 DIAGNOSIS — N39.0 URINARY TRACT INFECTION, RECURRENT: ICD-10-CM

## 2025-05-07 DIAGNOSIS — J45.40 MODERATE PERSISTENT ASTHMA WITHOUT COMPLICATION: ICD-10-CM

## 2025-05-07 DIAGNOSIS — Z85.828 HISTORY OF SQUAMOUS CELL CARCINOMA OF SKIN: ICD-10-CM

## 2025-05-07 DIAGNOSIS — I25.810 CORONARY ARTERY DISEASE INVOLVING AUTOLOGOUS ARTERY CORONARY BYPASS GRAFT WITHOUT ANGINA PECTORIS: Primary | ICD-10-CM

## 2025-05-07 DIAGNOSIS — Z85.828 HISTORY OF BASAL CELL CARCINOMA (BCC) OF SKIN: ICD-10-CM

## 2025-05-07 DIAGNOSIS — K21.9 LARYNGOPHARYNGEAL REFLUX: ICD-10-CM

## 2025-05-07 RX ORDER — MELOXICAM 7.5 MG/1
15 TABLET ORAL DAILY PRN
Qty: 60 TABLET | Refills: 0 | Status: SHIPPED | OUTPATIENT
Start: 2025-05-07

## 2025-05-07 NOTE — PROGRESS NOTES
Colleen Lovell is a 74 y.o. female, who presents with a chief complaint of   Chief Complaint   Patient presents with    Prediabetes     6 mo f/u    Heartburn     6 mo f/u       Coronary Artery Disease  Symptoms include chest pain. Risk factors include hyperlipidemia.   Hypertension  Associated symptoms include chest pain.   Hyperlipidemia  Associated symptoms include chest pain. Pertinent negatives include no leg pain.   Insomnia  Associated symptoms include arthralgias and chest pain. Pertinent negatives include no abdominal pain, fever, numbness or weakness.   Heartburn  She complains of chest pain. She reports no abdominal pain. Pertinent negatives include no weight loss.   Back Pain  Chronicity:  Chronic  Onset:  More than 1 month ago  Frequency:  Daily  Progression since onset:  Worsening  Pain location:  Thoracic spine  Pain quality:  Burning and stabbing  Radiates to:  Does not radiate  Pain-numeric:  7/10  Pain is:  Worse during the day  Aggravated by:  Sitting and twisting  Stiffness is present:  All day  Associated symptoms: chest pain    Associated symptoms: no abdominal pain, no bladder incontinence, no bowel incontinence, no dysuria, no fever, no leg pain, no numbness, no paresthesias, no pelvic pain, no perianal numbness, no tingling, no weakness and no weight loss    Risk factors:  History of osteoporosis and recent trauma  Additional Information:  Kyphoplasty back surgery 2/1/25     1.  CAD.  She had CABG X 3 at age 46.  Coronary catheterization 10/2020 showed occlusion of LAD diagonal, with the plan to treat medically. Tolerating rosuvastatin, isosorbide, ASA, beta blocker, ace-inhibitor.  She sees Dr. Bee.    2. Depression.  Pt reports she is still doing well with escitalopram.    3. Asthma.  Pt tolerating Symbicort and denies symptoms of a flare.    4. T12 and T1 compression fractures.  Had T12 kyphoplasty 2/1/2025 in [place] by Dr. Kirstin Barron MD.  She still has some pain; she went  to ED due to chest pain and it was felt to be radiating pain from compression fractures.    The following portions of the patient's history were reviewed and updated as appropriate: allergies, current medications, past family history, past medical history, past social history, past surgical history and problem list.    Allergies: Patient has no known allergies.    Review of Systems   Constitutional: Negative.  Negative for fever and weight loss.   Eyes: Negative.    Respiratory: Negative.     Cardiovascular: Negative.  Positive for chest pain.   Gastrointestinal: Negative.  Negative for abdominal pain and bowel incontinence.   Endocrine: Negative.    Genitourinary:  Positive for frequency. Negative for bladder incontinence, dysuria and pelvic pain.   Musculoskeletal:  Positive for arthralgias.   Skin: Negative.    Allergic/Immunologic: Positive for environmental allergies.   Neurological: Negative.  Negative for tingling, weakness, numbness and paresthesias.   Hematological: Negative.    Psychiatric/Behavioral:  Positive for sleep disturbance. The patient has insomnia.              Wt Readings from Last 3 Encounters:   05/07/25 73.5 kg (162 lb)   04/29/25 76.2 kg (168 lb)   10/31/24 72 kg (158 lb 12.8 oz)     Temp Readings from Last 3 Encounters:   05/07/25 98.2 °F (36.8 °C) (Infrared)   04/29/25 98.3 °F (36.8 °C) (Oral)   10/31/24 97.7 °F (36.5 °C) (Infrared)     BP Readings from Last 3 Encounters:   05/07/25 130/58   04/29/25 131/65   10/31/24 128/72     Pulse Readings from Last 3 Encounters:   05/07/25 71   04/29/25 65   10/31/24 63     Body mass index is 28.7 kg/m².  @LASTSAO2(3)@    Physical Exam  Vitals and nursing note reviewed.   Constitutional:       Appearance: Normal appearance.   HENT:      Head: Normocephalic and atraumatic.      Nose: Nose normal.      Mouth/Throat:      Mouth: Mucous membranes are moist.   Eyes:      Extraocular Movements: Extraocular movements intact.      Conjunctiva/sclera:  Conjunctivae normal.   Cardiovascular:      Rate and Rhythm: Normal rate and regular rhythm.      Heart sounds: No murmur heard.  Pulmonary:      Effort: Pulmonary effort is normal.      Breath sounds: Normal breath sounds.   Abdominal:      Palpations: Abdomen is soft.      Tenderness: There is no abdominal tenderness.   Musculoskeletal:      Cervical back: Neck supple. No rigidity.      Right lower leg: No edema.      Left lower leg: No edema.   Skin:     General: Skin is warm and dry.   Neurological:      General: No focal deficit present.      Mental Status: She is alert and oriented to person, place, and time.   Psychiatric:         Mood and Affect: Mood normal.         Behavior: Behavior normal.             Diagnoses and all orders for this visit:    1. Coronary artery disease involving autologous artery coronary bypass graft without angina pectoris (Primary)  -     Lipid Panel With / Chol / HDL Ratio; Future    2. Non-seasonal allergic rhinitis, unspecified trigger    3. Laryngopharyngeal reflux  -     CBC (No Diff); Future    4. Prediabetes  -     Comprehensive Metabolic Panel; Future  -     Hemoglobin A1c; Future  -     Vitamin B12; Future    5. Age-related osteoporosis without current pathological fracture  -     Ambulatory Referral to Sports Medicine    6. Urinary tract infection, recurrent    7. Insomnia, unspecified type  -     TSH; Future    8. History of adenomatous polyp of colon  -     Ambulatory Referral to Gastroenterology    9. Moderate persistent asthma without complication    10. Herpes labialis    11. Reactive depression    12. Routine health maintenance    13. STACIA on CPAP    14. Hypovitaminosis D  -     Vitamin D,25-Hydroxy; Future    15. Non-traumatic compression fracture of T12 thoracic vertebra with routine healing, subsequent encounter  -     meloxicam (MOBIC) 7.5 MG tablet; Take 2 tablets by mouth Daily As Needed for Moderate Pain.  Dispense: 60 tablet; Refill: 0    16. History of basal  cell carcinoma (BCC) of skin    17. History of squamous cell carcinoma of skin      1. CAD.  On ASA, isosorbide, lisinopril, metoprolol, rosuvastatin.  Per Dr. Bee.     2. MAILE. Controlled with cetirizine and fluticasone nasal spray.      3. GERD.  Controlled with pantoprazole and tums.     4. Prediabetes.  A1c 5.9, down from 6.0.  Continue lifestyle measures.     5. Osteoporosis.  Didn't tolerate alendronate.  Didn't tolerate Prolia due to muscle cramps.  Last Dexa scan 4/2025. Calcium, vitamin D, weightbearing exercise.  She had a recent compression fracture.  Refer to osteoporosis specialist.     6. Recurrent UTI.  She is using vaginal estrogen gyn.  She took daily cephalexin for 3 months.       7. Insomnia.  Continue trazodone.  Doing well with this and lifestyle measures.     8. History of colon polyp.  Had colonoscopy June 2020 by Dr. Coyle.  Next due in 5 years, 6/2025.     9. Asthma, moderate persistent.  Controlled with Symbicort.     10. Herpes labialis.  Prn valacyclovir.     11. Reactive depression.  Doing well with escitalopram.     12. Routine health maint.  Pneumococcal vaccines UTD.  Prevnar 20 next time.  Shingrix done at pharmacy.  Mammogram UTD.  RSV and Covid-19 vaccines at pharmacy.     13. STACIA.  Using and benefiting from CPAP.  Per Dr. Gomes.    14. Compression fracture of T12 and T1, s/p kyphoplasty 2/1/2025 in [place].  Meloxicam refilled.  She went to ED due to radiating pain to the chest and had negative cardiac workup.  She is still having pain.  Refer to spine ortho.    15. Hx basal cell and squamous cell carcinomas of skin.  She is followed by dermatology.         Outpatient Medications Prior to Visit   Medication Sig Dispense Refill    aspirin 81 MG EC tablet Take  by mouth.      Calcium Carb-Cholecalciferol (Calcium 1000 + D) 1000-800 MG-UNIT tablet Daily.      Cetirizine HCl 10 MG capsule Take  by mouth.      Cholecalciferol (VITAMIN D3) 5000 UNITS capsule capsule Take 1,000  Units by mouth daily.      Cyanocobalamin (B-12) 1000 MCG capsule       escitalopram (LEXAPRO) 20 MG tablet TAKE 1 TABLET EVERY DAY 90 tablet 3    estradiol (ESTRACE) 0.1 MG/GM vaginal cream Insert 2 g into the vagina 2 (Two) Times a Week. 42.5 g 6    fluticasone (FLONASE) 50 MCG/ACT nasal spray       gabapentin (NEURONTIN) 100 MG capsule Take 1 capsule by mouth 2 (Two) Times a Day. (Patient taking differently: Take 3 capsules by mouth 2 (Two) Times a Day.)      isosorbide mononitrate (IMDUR) 60 MG 24 hr tablet TAKE 1 TABLET EVERY DAY 90 tablet 3    lisinopril (PRINIVIL,ZESTRIL) 2.5 MG tablet TAKE 1 TABLET EVERY DAY 90 tablet 3    Magnesium 400 MG capsule       methocarbamol (ROBAXIN) 500 MG tablet Take 1 tablet by mouth As Needed for Muscle Spasms.      metoprolol tartrate (LOPRESSOR) 25 MG tablet TAKE 1 TABLET TWICE DAILY 180 tablet 3    Multiple Vitamin (MULTI VITAMIN DAILY PO) Take  by mouth.      Omega-3 Fatty Acids (FISH OIL) 1200 MG capsule capsule Take  by mouth.      pantoprazole (PROTONIX) 40 MG EC tablet TAKE 1 TABLET EVERY DAY 90 tablet 3    PROAIR  (90 Base) MCG/ACT inhaler       rosuvastatin (CRESTOR) 20 MG tablet TAKE 1 TABLET EVERY NIGHT 90 tablet 3    Symbicort 160-4.5 MCG/ACT inhaler INHALE 2 PUFFS TWICE DAILY 3 each 3    traZODone (DESYREL) 100 MG tablet TAKE 1 TABLET EVERY NIGHT 90 tablet 3    valACYclovir (VALTREX) 1000 MG tablet TAKE 2 TABLETS BY MOUTH TWICE DAILY AT ONSET OF COLD SORE      meloxicam (MOBIC) 7.5 MG tablet Take 2 tablets by mouth Daily for 7 days. 14 tablet 0     No facility-administered medications prior to visit.     New Medications Ordered This Visit   Medications    meloxicam (MOBIC) 7.5 MG tablet     Sig: Take 2 tablets by mouth Daily As Needed for Moderate Pain.     Dispense:  60 tablet     Refill:  0     Medications Discontinued During This Encounter   Medication Reason    meloxicam (MOBIC) 7.5 MG tablet Reorder       Return in about 6 months (around 11/7/2025) for  Medicare Wellness.      Answers for HPI/ROS submitted by the patient on 4/3/2023  What is the primary reason for your visit?: High Blood Pressure

## 2025-05-23 ENCOUNTER — OFFICE VISIT (OUTPATIENT)
Dept: NEUROSURGERY | Age: 75
End: 2025-05-23
Payer: MEDICARE

## 2025-05-23 VITALS
OXYGEN SATURATION: 98 % | WEIGHT: 164 LBS | HEART RATE: 63 BPM | BODY MASS INDEX: 29.06 KG/M2 | HEIGHT: 63 IN | RESPIRATION RATE: 18 BRPM

## 2025-05-23 DIAGNOSIS — Z98.890 STATUS POST KYPHOPLASTY: Primary | ICD-10-CM

## 2025-05-23 NOTE — PROGRESS NOTES
"Subjective   History of Present Illness: Colleen Lovell is a 74 y.o. female is being seen for consultation today at the request of Tracee Curran MD for back pain.  Patient suffered a T1 and T12 compression fracture at the end of January.  She underwent a kyphoplasty of T12.  She has seen significant improvement in her symptoms since then but continues to have some lingering pain.  She was started on meloxicam which also has significantly improved her pain.  Patient denies any significant upper or lower extremity symptoms.  Patient has not done any recent conservative measures and did no physical therapy after her kyphoplasty.    Chief Complaint   Patient presents with    Back Pain          Previous treatment:   NSAID'S, Muscle Relaxants, and Rest    Previous neurosurgery:  2/1/2025- Kyphoplasty T12 in [place]    Previous injections:     The following portions of the patient's history were reviewed and updated as appropriate: allergies, current medications, past family history, past medical history, past social history, past surgical history, and problem list.    Review of Systems   Constitutional:  Positive for activity change.   HENT: Negative.     Eyes: Negative.    Respiratory:  Negative for chest tightness and shortness of breath.    Cardiovascular:  Negative for chest pain.   Gastrointestinal: Negative.    Endocrine: Negative.    Genitourinary: Negative.    Musculoskeletal:  Positive for back pain, gait problem and myalgias.   Skin: Negative.    Allergic/Immunologic: Negative.    Neurological:  Negative for weakness and numbness.   Hematological: Negative.    Psychiatric/Behavioral: Negative.         Objective      Pulse 63   Resp 18   Ht 160 cm (63\")   Wt 74.4 kg (164 lb)   SpO2 98%   BMI 29.05 kg/m²    Body mass index is 29.05 kg/m².  Vitals:    05/23/25 1140   PainSc: 5    PainLoc: Back         Neurological Exam  Mental Status  Awake, alert and oriented to person, place and time.    Motor   " Strength is 5/5 throughout all four extremities.    Sensory  Sensation is intact to light touch, pinprick, vibration and proprioception in all four extremities.    Reflexes    Right pathological reflexes: Aisha's absent.  Left pathological reflexes: Aisha's absent.          Assessment & Plan   Independent Review of Radiographic Studies:      I personally reviewed and interpreted the images from the following studies.    Imaging reports including preoperative MRI postoperative CT scan and x-ray consistent with T1 and T12 compression fractures on the MRI that were acute to subacute.  The MRI notes that there is no significant central or foraminal stenosis.  The postoperative CT scans performed about 2 months after the surgery demonstrate T12 kyphoplasty without complication and no evidence of high-grade central or foraminal stenosis.  X-ray also consistent with kyphoplasty.  Both postoperative images note that there has been no progression of either compression fracture.    Medical Decision Making:      Colleen Lovell is a 74 y.o. female status post T12 kyphoplasty performed in [place] about 4 months ago who is seen significant improvement in her preoperative pain due to compression fracture at T12.  She still have pain but this is improved significantly with NSAIDs.  Reports from the postoperative CT scan is reassuring.  I will send the patient to physical therapy to help with continued pain.  Overall she says that she continues to improve and I do not think that further imaging is necessary at this time.  Should her symptoms worsen over the course of time or if she were to develop new symptoms she can follow-up with me with imaging most likely CT scan of the thoracic spine.  Otherwise I will see her back as needed.      Diagnoses and all orders for this visit:    1. Status post kyphoplasty (Primary)      No follow-ups on file.    This patient was examined wearing appropriate personal protective equipment.                       Dr. Daniel Malhotra IV    05/23/25  12:00 EDT

## 2025-05-28 DIAGNOSIS — N95.2 VAGINAL ATROPHY: ICD-10-CM

## 2025-05-28 DIAGNOSIS — I25.810 CORONARY ARTERY DISEASE INVOLVING AUTOLOGOUS ARTERY CORONARY BYPASS GRAFT WITHOUT ANGINA PECTORIS: ICD-10-CM

## 2025-05-28 RX ORDER — ESTRADIOL 0.1 MG/G
2 CREAM VAGINAL 2 TIMES WEEKLY
Qty: 42.5 G | Refills: 6 | Status: SHIPPED | OUTPATIENT
Start: 2025-05-29

## 2025-05-28 RX ORDER — ISOSORBIDE MONONITRATE 60 MG/1
60 TABLET, EXTENDED RELEASE ORAL DAILY
Qty: 90 TABLET | Refills: 3 | Status: SHIPPED | OUTPATIENT
Start: 2025-05-28

## 2025-05-30 DIAGNOSIS — Z98.890 STATUS POST KYPHOPLASTY: Primary | ICD-10-CM

## 2025-06-09 ENCOUNTER — HOSPITAL ENCOUNTER (OUTPATIENT)
Dept: PHYSICAL THERAPY | Facility: HOSPITAL | Age: 75
Setting detail: THERAPIES SERIES
Discharge: HOME | End: 2025-06-09
Payer: MEDICARE

## 2025-06-09 DIAGNOSIS — Z98.890 STATUS POST KYPHOPLASTY: Primary | ICD-10-CM

## 2025-06-09 PROCEDURE — 97110 THERAPEUTIC EXERCISES: CPT | Performed by: PHYSICAL THERAPIST

## 2025-06-09 PROCEDURE — 97161 PT EVAL LOW COMPLEX 20 MIN: CPT | Performed by: PHYSICAL THERAPIST

## 2025-06-10 NOTE — THERAPY EVALUATION
"  Outpatient Physical Therapy Ortho Initial Evaluation  [facility]     Patient Name: Colleen Lovell  : 1950  MRN: [id number]  Today's Date: 6/10/2025      Visit Date: 2025    Patient Active Problem List   Diagnosis    Atopic rhinitis    Herpes simplex type 1 infection    Impaired fasting glucose    Insomnia    Menopausal symptom    Age-related osteoporosis without current pathological fracture    Laryngopharyngeal reflux    Urinary tract infection, recurrent    Routine health maintenance    CAD (coronary artery disease)    Benign essential hypertension    Diastolic dysfunction    History of adenomatous polyp of colon    Nocturia    Prediabetes    Moderate persistent asthma without complication    Herpes labialis    Reactive depression    STACIA on CPAP    History of basal cell carcinoma (BCC) of skin    History of squamous cell carcinoma of skin        Past Medical History:   Diagnosis Date    Abnormal ECG 3/26/23    Abnormal liver function test     Allergic     Ankle sprain     Anxiety     Arthritis     Asthma     Bursitis of hip     CAD (coronary artery disease)     , s/p CABG, \"-\"PET scan 2011, reversible ischemia on Lexiscan PET perfusion study  Dr.Janet Ocampo    Cancer Skin    Cataract     Cholelithiasis      CT    Chronic UTI     Colon polyp     Depression     Frozen shoulder     GERD (gastroesophageal reflux disease)     Heart disease     Herpes simplex type 1 infection     orolabial    Herpes zoster     2010    Hx of bone scan      11: sp -1.8, hip -0.5, fem neck -0.8 14: sp -1.7, hip -0.6, fem neck -1.0    Hyperlipidemia     Hypertension     Incidental lung nodule, less than or equal to 3mm     CTPET 2016 [place], no f/u needed    Insomnia     Internal hemorrhoids     Low back pain 2025    Myocardial infarction     Neck strain     Obstructive sleep apnea     mild     Osteopenia     Dexa 11 sp -1.8 hip -0.5 fem neck -0.8    Papanicolaou " smear     2011    Pregnancy         Rotator cuff tendinitis     2004    Tubular adenoma of colon     , , cecal polyp, 2012, , adenoma with mild dysplasia 4 mm ascending colon        Past Surgical History:   Procedure Laterality Date    AUGMENTATION MAMMAPLASTY      BLADDER SUSPENSION      BREAST IMPLANT SURGERY      BREAST SURGERY  1984    Implants    CARDIAC CATHETERIZATION   &     CARDIAC SURGERY      COLONOSCOPY        - ascending colon adenoma with mild dysplasia.     COLONOSCOPY  2015    Dr Coyle - patient is to follow up in 2020    CORONARY ARTERY BYPASS GRAFT      COSMETIC SURGERY      EYE SURGERY      Cataract    FRACTURE SURGERY  2025    Kyphoplasty on T-12    LAPAROSCOPIC TUBAL LIGATION      ROTATOR CUFF REPAIR Left     SPINE SURGERY  2025    Kyphoplasty at T-12    TUBAL ABDOMINAL LIGATION         Visit Dx:     ICD-10-CM ICD-9-CM   1. Status post kyphoplasty  Z98.890 V45.89          Patient History       Row Name 25 0850 25 1037          History    Chief Complaint Muscle tenderness;Muscle weakness;Pain  -GC Muscle tenderness;Muscle weakness;Pain  -GC (r) patient (t)     Type of Pain Back pain  -GC Back pain  -GC (r) patient (t)     Date Current Problem(s) Began 25  -GC 25  -GC (r) patient (t)     Brief Description of Current Complaint Pt injured her back in February when she fell while playing pickleball. X-rays, MRI, and CT showed thoracic compression fractures. She underwent a T12 kyphoplasty in March. She was in a clam shell type brace for several weeks. She has not had any formal therapy at this time. She is complaining of thoracic back pain, left sided, that is interfering with her daily activites. She was seen by Dr. Malhotra who has referred her to therapy.  -GC Lingering pain with movement  -GC (r) patient (t)     Patient/Caregiver Goals Relieve pain;Return to prior  level of function;Improve mobility;Improve strength  -GC Relieve pain;Return to prior level of function;Improve mobility;Improve strength  -GC (r) patient (t)     Hand Dominance right-handed  -GC right-handed  -GC (r) patient (t)     Occupation/sports/leisure activities Walking  -GC Walking  -GC (r) patient (t)     Patient seeing anyone else for problem(s)? No  -GC No  -GC (r) patient (t)     What clinical tests have you had for this problem? X-ray;CT scan;MRI;Bone Density;Blood Work  -GC X-ray;CT scan;MRI;Bone Density;Blood Work  -GC (r) patient (t)     Results of Clinical Tests thoracic compression fracture  -GC --     Are you or can you be pregnant No  -GC No  -GC (r) patient (t)        Pain     Pain Location Back  -GC --     Pain at Present 3;4  -GC --     Pain at Worst 6  -GC --     Pain Frequency Constant/continuous  -GC --     Pain Description Aching;Discomfort;Sore;Tightness  -GC --     What Performance Factors Make the Current Problem(s) WORSE? Pt c/o pain with walking, rolling in bed, twisting  -GC --     What Performance Factors Make the Current Problem(s) BETTER? Pt feels best at rest  -GC --     Difficulties with ADL's? Pt has pain with bed mobility, twisting  -GC --     Difficulties with recreational activities? Pt is unable to walk for exercise  -GC --        Fall Risk Assessment    Any falls in the past year: Yes  -GC Yes  -GC (r) patient (t)     Factors that contributed to the fall: Lost balance  -GC Lost balance  -GC (r) patient (t)        Services    Prior Rehab/Home Health Experiences Yes  -GC Yes  -GC (r) patient (t)     Are you currently receiving Home Health services No  -GC No  -GC (r) patient (t)     Do you plan to receive Home Health services in the near future No  -GC No  -GC (r) patient (t)        Daily Activities    Primary Language English  -GC English  -GC (r) patient (t)     Are you able to read Yes  -GC Yes  -GC (r) patient (t)     Are you able to write Yes  -GC Yes  -GC (r) patient  (t)     How does patient learn best? Reading;Pictures/Video  -GC Reading;Pictures/Video  -GC (r) patient (t)     Teaching needs identified Home Exercise Program;Management of Condition  -GC --     Patient is concerned about/has problems with Performing home management (household chores, shopping, care of dependents);Performing job responsibilities/community activities (work, school,;Walking;Transfers (getting out of a chair, bed)  -GC --     Does patient have problems with the following? None  -GC --     Barriers to learning None  -GC --     Functional Status mobility issues preventing performance of daily activities  -GC --     Pt Participated in POC and Goals Yes  -GC --        Safety    Are you being hurt, hit, or frightened by anyone at home or in your life? No  -GC No  -GC (r) patient (t)     Are you being neglected by a caregiver No  -GC No  -GC (r) patient (t)     Have you had any of the following issues with Depression  -GC Depression  -GC (r) patient (t)               User Key  (r) = Recorded By, (t) = Taken By, (c) = Cosigned By      Initials Name Provider Type    GC Keaton Krishnamurthy, PT Physical Therapist    patient Colleen Lovell --                     PT Ortho       Row Name 06/09/25 0850       Posture/Observations    Posture/Observations Comments Pt is noted to have a decreased lumbar lordosis. There is no lateral shift  -GC       Lumbosacral Palpation    Thoracolumbar Segment Tender  T4-L2  -GC    Quadratus Lumborum Bilateral:;Tender;Guarded/taut  -GC    Erector Spinae (Paraspinals) Bilateral:;Tender;Guarded/taut  -GC       General ROM    GENERAL ROM COMMENTS Thoracic ROM is approximately 50% all planes with pain  -GC       Head/Neck/Trunk    Trunk Extension AROM 50% range with pain  -GC    Trunk Flexion AROM 50% range with pain  -GC    Trunk Lt Lateral Flexion AROM 25% range with pain  -GC    Trunk Rt Lateral Flexion AROM 50% range with pain  -GC    Trunk Lt Rotation AROM 50% range with pain  -GC     Trunk Rt Rotation AROM 50% range with pain  -GC       MMT Neck/Trunk    Trunk Flexion MMT, Gross Movement (4/5) good  -GC    Trunk Extension MMT, Gross Movement (4/5) good  -GC       Sensation    Light Touch No apparent deficits  -GC       Upper Extremity Flexibility    Upper Trapezius Bilateral:;Moderately limited  -GC    Levator Scapula Bilateral:;Moderately limited  -GC    Latissimus Dorsi Bilateral:;Moderately limited  -GC       Lower Extremity Flexibility    Hamstrings Bilateral:;Moderately limited  -GC    Hip Flexors Bilateral:;Mildly limited  -GC    Hip External Rotators Bilateral:;Moderately limited  -GC    Hip Internal Rotators Bilateral:;Mildly limited  -GC    Quadratus Lumborum Bilateral:;Moderately limited  -GC       Transfers    Comment, (Transfers) Pt has back pain when going sit to/from supine and when rolling supine to/from prone  -       Gait/Stairs (Locomotion)    Comment, (Gait/Stairs) Pt ambulates normally on level surfaces  -              User Key  (r) = Recorded By, (t) = Taken By, (c) = Cosigned By      Initials Name Provider Type    GC Keaton Krishnamurthy, PT Physical Therapist                                Therapy Education  Given: HEP, Symptoms/condition management, Pain management, Posture/body mechanics  Program: New  How Provided: Verbal, Demonstration, Written  Provided to: Patient  Level of Understanding: Teach back education performed, Verbalized, Demonstrated      PT OP Goals       Row Name 06/09/25 0850          PT Short Term Goals    STG Date to Achieve 06/23/25  -     STG 1 Decrease back pain to 3-4/10 with activity.  -     STG 2 Increase trunk ROM to at least 75% range all planes with testing.  -     STG 3 Increase hamstring, piriformis, quadratus, and paraspinal flexibility to only a minimal restriction with testing.  -     STG 4 Pt will be independent with her HEP issued by this therapist.  -        Long Term Goals    LTG Date to Achieve 07/07/25  -     LTG 1  Decrease back pain to 0-1/10 with activity.  -     LTG 2 Increase trunk ROM to WFL all planes with testing.  -     LTG 3 Increase hamstring, piriformis, quadratus, and paraspinal flexibility to WFL with testing.  -     LTG 4 Increase core strength to 5/5 all planes with testing.  -     LTG 5 Pt will be able to return to walking for exercise without pain.  -        Time Calculation    PT Goal Re-Cert Due Date 07/07/25  -               User Key  (r) = Recorded By, (t) = Taken By, (c) = Cosigned By      Initials Name Provider Type     Keaton Krishnamurthy, PT Physical Therapist                     PT Assessment/Plan       Row Name 06/09/25 0850          PT Assessment    Functional Limitations Limitation in home management;Limitations in community activities;Limitations in functional capacity and performance;Performance in leisure activities  -     Impairments Range of motion;Pain;Muscle strength;Impaired flexibility  -     Assessment Comments Pt presents approximately several months s/p thoracic compression fractures with t12 kyphoplasty. She rates her pain up to 9/10 with activity.  -     Please refer to paper survey for additional self-reported information Yes  -GC     Rehab Potential Good  -     Patient/caregiver participated in establishment of treatment plan and goals Yes  -     Patient would benefit from skilled therapy intervention Yes  -        PT Plan    PT Frequency 1x/week;2x/week  -     Predicted Duration of Therapy Intervention (PT) 4 weeks  -     Planned CPT's? PT EVAL LOW COMPLEXITY: 51855;PT RE-EVAL: 99757;PT HOT OR COLD PACK TREAT MCARE;PT ELECTRICAL STIM UNATTEND:   -     PT Plan Comments Pt is to continue her HEP 2x daily.  -               User Key  (r) = Recorded By, (t) = Taken By, (c) = Cosigned By      Initials Name Provider Type     Keaton Krishnamurthy, PT Physical Therapist                       OP Exercises       Row Name 06/09/25 0850             Exercise 1     Exercise Name 1 Hamstring stretch-bilateral  -GC      Reps 1 10  -GC      Time 1 10 secs  -GC         Exercise 2    Exercise Name 2 Piriformis stretch-bilateral  -GC      Reps 2 10  -GC      Time 2 10 secs  -GC         Exercise 3    Exercise Name 3 Quadratus stretch-bilateral  -GC      Reps 3 10  -GC      Time 3 10 secs  -GC         Exercise 4    Exercise Name 4 Trunk rotation stretch in sidelying-bilateral  -GC      Reps 4 10  -GC      Time 4 10 secs  -GC         Exercise 5    Exercise Name 5 shoulder FLEX stretch with cane in supine  -GC      Reps 5 10  -GC      Time 5 10 secs  -GC                User Key  (r) = Recorded By, (t) = Taken By, (c) = Cosigned By      Initials Name Provider Type     Keaton Krishnamurthy, PT Physical Therapist                                            Time Calculation:     Start Time: 0850  Stop Time: 0956  Time Calculation (min): 66 min     Therapy Charges for Today       Code Description Service Date Service Provider Modifiers Qty    21870196986 HC PT EVAL LOW COMPLEXITY 2 6/9/2025 Keaton Krishnamurthy, PT GP 1    [id number]  PT THER PROC EA 15 MIN 6/9/2025 Keaton Krishnamurthy, PT GP 1                      Keaton Krishnamurthy PT  6/10/2025

## 2025-06-16 ENCOUNTER — HOSPITAL ENCOUNTER (OUTPATIENT)
Dept: PHYSICAL THERAPY | Facility: HOSPITAL | Age: 75
Setting detail: THERAPIES SERIES
Discharge: HOME | End: 2025-06-16
Payer: MEDICARE

## 2025-06-16 DIAGNOSIS — Z98.890 STATUS POST KYPHOPLASTY: Primary | ICD-10-CM

## 2025-06-16 PROCEDURE — 97110 THERAPEUTIC EXERCISES: CPT | Performed by: PHYSICAL THERAPIST

## 2025-06-16 NOTE — THERAPY TREATMENT NOTE
"  Outpatient Physical Therapy Ortho Treatment Note  [facility]     Patient Name: Colleen Lovell  : 1950  MRN: [id number]  Today's Date: 2025      Visit Date: 2025    Visit Dx:    ICD-10-CM ICD-9-CM   1. Status post kyphoplasty  Z98.890 V45.89       Patient Active Problem List   Diagnosis    Atopic rhinitis    Herpes simplex type 1 infection    Impaired fasting glucose    Insomnia    Menopausal symptom    Age-related osteoporosis without current pathological fracture    Laryngopharyngeal reflux    Urinary tract infection, recurrent    Routine health maintenance    CAD (coronary artery disease)    Benign essential hypertension    Diastolic dysfunction    History of adenomatous polyp of colon    Nocturia    Prediabetes    Moderate persistent asthma without complication    Herpes labialis    Reactive depression    STACIA on CPAP    History of basal cell carcinoma (BCC) of skin    History of squamous cell carcinoma of skin        Past Medical History:   Diagnosis Date    Abnormal ECG 3/26/23    Abnormal liver function test     Allergic     Ankle sprain     Anxiety     Arthritis     Asthma     Bursitis of hip     CAD (coronary artery disease)     , s/p CABG, \"-\"PET scan 2011, reversible ischemia on Lexiscan PET perfusion study  Dr.Janet Ocampo    Cancer Skin    Cataract     Cholelithiasis      CT    Chronic UTI     Colon polyp     Depression     Frozen shoulder     GERD (gastroesophageal reflux disease)     Heart disease     Herpes simplex type 1 infection     orolabial    Herpes zoster     2010    Hx of bone scan      11: sp -1.8, hip -0.5, fem neck -0.8 14: sp -1.7, hip -0.6, fem neck -1.0    Hyperlipidemia     Hypertension     Incidental lung nodule, less than or equal to 3mm     CTPET 2016 [facility] [place] f/u needed    Insomnia     Internal hemorrhoids     Low back pain 2025    Myocardial infarction     Neck strain     Obstructive sleep apnea     mild     " Osteopenia     Dexa 11 sp -1.8 hip -0.5 fem neck -0.8    Papanicolaou smear     2011    Pregnancy         Rotator cuff tendinitis     2004    Tubular adenoma of colon     , , cecal polyp, 2012, , adenoma with mild dysplasia 4 mm ascending colon        Past Surgical History:   Procedure Laterality Date    AUGMENTATION MAMMAPLASTY      BLADDER SUSPENSION      BREAST IMPLANT SURGERY      BREAST SURGERY      Implants    CARDIAC CATHETERIZATION   &     CARDIAC SURGERY      COLONOSCOPY        - ascending colon adenoma with mild dysplasia.     COLONOSCOPY  2015    Dr Coyle - patient is to follow up in 2020    CORONARY ARTERY BYPASS GRAFT      COSMETIC SURGERY      EYE SURGERY      Cataract    FRACTURE SURGERY  2025    Kyphoplasty on T-12    LAPAROSCOPIC TUBAL LIGATION      ROTATOR CUFF REPAIR Left     SPINE SURGERY  2025    Kyphoplasty at T-12    TUBAL ABDOMINAL LIGATION                          PT Assessment/Plan       Row Name 25 0930          PT Assessment    Assessment Comments Pt tolerated her exercise progression well.  -GC        PT Plan    PT Plan Comments Pt is to continue her HEP 2x daily.  -               User Key  (r) = Recorded By, (t) = Taken By, (c) = Cosigned By      Initials Name Provider Type    GC Keaton Krishnamurthy, PT Physical Therapist                       OP Exercises       Row Name 25 0930             Subjective    Subjective Comments Pt states she is not too sore. She says she has already done her stretches this morning.  -GC         Exercise 6    Exercise Name 6 Prayer stretch-bilateral  -GC      Reps 6 5  -GC      Time 6 10 secs  -GC         Exercise 7    Exercise Name 7 Prone hip extension alternately over 1 pillow  -GC      Reps 7 30  -GC         Exercise 8    Exercise Name 8 prone chest raise over pillow  -GC      Reps 8 20  -GC         Exercise 9    Exercise Name  9 bilateral shoulder EXT vs therbaand  -GC      Reps 9 20  -GC      Time 9 green  -GC                User Key  (r) = Recorded By, (t) = Taken By, (c) = Cosigned By      Initials Name Provider Type    GC Keaton Krishnamurthy, PT Physical Therapist                                                    Time Calculation:   Start Time: 0930  Stop Time: 1000  Time Calculation (min): 30 min  Therapy Charges for Today       Code Description Service Date Service Provider Modifiers Qty    [id number]  PT THER PROC EA 15 MIN 6/16/2025 Keaton Krishnamurthy, PT GP 1                      Keaton Krishnamurthy, PT  6/16/2025

## 2025-06-23 ENCOUNTER — HOSPITAL ENCOUNTER (OUTPATIENT)
Dept: PHYSICAL THERAPY | Facility: HOSPITAL | Age: 75
Setting detail: THERAPIES SERIES
Discharge: HOME | End: 2025-06-23
Payer: MEDICARE

## 2025-06-23 DIAGNOSIS — Z98.890 STATUS POST KYPHOPLASTY: Primary | ICD-10-CM

## 2025-06-23 PROCEDURE — 97110 THERAPEUTIC EXERCISES: CPT | Performed by: PHYSICAL THERAPIST

## 2025-06-23 NOTE — THERAPY TREATMENT NOTE
"  Outpatient Physical Therapy Ortho Treatment Note  [facility]     Patient Name: Colleen Lovell  : 1950  MRN: [id number]  Today's Date: 2025      Visit Date: 2025    Visit Dx:    ICD-10-CM ICD-9-CM   1. Status post kyphoplasty  Z98.890 V45.89       Patient Active Problem List   Diagnosis    Atopic rhinitis    Herpes simplex type 1 infection    Impaired fasting glucose    Insomnia    Menopausal symptom    Age-related osteoporosis without current pathological fracture    Laryngopharyngeal reflux    Urinary tract infection, recurrent    Routine health maintenance    CAD (coronary artery disease)    Benign essential hypertension    Diastolic dysfunction    History of adenomatous polyp of colon    Nocturia    Prediabetes    Moderate persistent asthma without complication    Herpes labialis    Reactive depression    STACIA on CPAP    History of basal cell carcinoma (BCC) of skin    History of squamous cell carcinoma of skin        Past Medical History:   Diagnosis Date    Abnormal ECG 3/26/23    Abnormal liver function test     Allergic     Ankle sprain     Anxiety     Arthritis     Asthma     Bursitis of hip     CAD (coronary artery disease)     , s/p CABG, \"-\"PET scan 2011, reversible ischemia on Lexiscan PET perfusion study  Dr.Janet Ocampo    Cancer Skin    Cataract     Cholelithiasis      CT    Chronic UTI     Colon polyp     Depression     Frozen shoulder     GERD (gastroesophageal reflux disease)     Heart disease     Herpes simplex type 1 infection     orolabial    Herpes zoster     2010    Hx of bone scan      11: sp -1.8, hip -0.5, fem neck -0.8 14: sp -1.7, hip -0.6, fem neck -1.0    Hyperlipidemia     Hypertension     Incidental lung nodule, less than or equal to 3mm     CTPET 2016 bunny aVliente f/u needed    Insomnia     Internal hemorrhoids     Low back pain 2025    Myocardial infarction     Neck strain     Obstructive sleep apnea     mild     " Osteopenia     Dexa 11 sp -1.8 hip -0.5 fem neck -0.8    Papanicolaou smear     2011    Pregnancy         Rotator cuff tendinitis         Tubular adenoma of colon     , , cecal polyp, 2012, , adenoma with mild dysplasia 4 mm ascending colon        Past Surgical History:   Procedure Laterality Date    AUGMENTATION MAMMAPLASTY      BLADDER SUSPENSION      BREAST IMPLANT SURGERY      BREAST SURGERY      Implants    CARDIAC CATHETERIZATION   &     CARDIAC SURGERY      COLONOSCOPY        - ascending colon adenoma with mild dysplasia.     COLONOSCOPY  2015    Dr Coyle - patient is to follow up in 2020    CORONARY ARTERY BYPASS GRAFT      COSMETIC SURGERY      EYE SURGERY      Cataract    FRACTURE SURGERY  2025    Kyphoplasty on T-12    LAPAROSCOPIC TUBAL LIGATION      ROTATOR CUFF REPAIR Left     SPINE SURGERY  2025    Kyphoplasty at T-12    TUBAL ABDOMINAL LIGATION          PT Ortho       Row Name 25 0880       Head/Neck/Trunk    Trunk Extension AROM 75% range  -GC    Trunk Flexion AROM 75% range  -GC    Trunk Lt Lateral Flexion AROM 75% range  -GC    Trunk Rt Lateral Flexion AROM 75% range  -GC    Trunk Lt Rotation AROM 75% range  -GC    Trunk Rt Rotation AROM 75% range  -GC       MMT Neck/Trunk    Trunk Flexion MMT, Gross Movement (4+/5) good plus  -GC    Trunk Extension MMT, Gross Movement (4+/5) good plus  -GC       Lower Extremity Flexibility    Hamstrings Bilateral:;Mildly limited  -GC    Hip External Rotators Bilateral:;Mildly limited  -GC    Quadratus Lumborum Bilateral:;Mildly limited  -GC              User Key  (r) = Recorded By, (t) = Taken By, (c) = Cosigned By      Initials Name Provider Type    GC Keaton Krishnamurthy, PT Physical Therapist                                 PT Assessment/Plan       Row Name 25 0846          PT Assessment    Assessment Comments Pt is doing well with  increased trunk ROM and improving function.  -GC        PT Plan    PT Plan Comments Pt is to continue her HEP daily. Will call if problems arise.  -GC               User Key  (r) = Recorded By, (t) = Taken By, (c) = Cosigned By      Initials Name Provider Type    Keaton Whitfield, PT Physical Therapist                       OP Exercises       Row Name 06/23/25 0855             Subjective    Subjective Comments Pt states she is doing well and she is feeling looser.  -GC         Exercise 10    Exercise Name 10 PPT  -      Reps 10 25  -GC      Time 10 5 secs  -GC         Exercise 11    Exercise Name 11 PPT with LE marching  -      Reps 11 30  -GC                User Key  (r) = Recorded By, (t) = Taken By, (c) = Cosigned By      Initials Name Provider Type    Keaton Whitfield, PT Physical Therapist                                  PT OP Goals       Row Name 06/23/25 0855          PT Short Term Goals    STG Date to Achieve 06/23/25  -GC     STG 1 Decrease back pain to 3-4/10 with activity.  -GC     STG 1 Progress Met  -     STG 2 Increase trunk ROM to at least 75% range all planes with testing.  -GC     STG 2 Progress Met  -GC     STG 3 Increase hamstring, piriformis, quadratus, and paraspinal flexibility to only a minimal restriction with testing.  -GC     STG 3 Progress Met  -     STG 4 Pt will be independent with her HEP issued by this therapist.  -     STG 4 Progress Met  -        Long Term Goals    LTG Date to Achieve 07/07/25  -GC     LTG 1 Decrease back pain to 0-1/10 with activity.  -GC     LTG 1 Progress Partially Met  -     LTG 2 Increase trunk ROM to WFL all planes with testing.  -GC     LTG 2 Progress Ongoing;Progressing  -     LTG 3 Increase hamstring, piriformis, quadratus, and paraspinal flexibility to WFL with testing.  -GC     LTG 3 Progress Ongoing;Progressing  -     LTG 4 Increase core strength to 5/5 all planes with testing.  -GC     LTG 4 Progress Ongoing;Progressing  -     LTG  5 Pt will be able to return to walking for exercise without pain.  -GC     LTG 5 Progress Partially Met;Ongoing;Progressing  -               User Key  (r) = Recorded By, (t) = Taken By, (c) = Cosigned By      Initials Name Provider Type    Keaton Whitfield, PT Physical Therapist                                   Time Calculation:   Start Time: 0900  Stop Time: 0915  Time Calculation (min): 15 min  Therapy Charges for Today       Code Description Service Date Service Provider Modifiers Qty    [id number]  PT THER PROC EA 15 MIN 6/23/2025 Keaton Krishnamurthy, PT GP 1                      Keaton Krishnamurthy, PALAK  6/23/2025

## 2025-07-22 ENCOUNTER — TELEPHONE (OUTPATIENT)
Dept: GASTROENTEROLOGY | Facility: CLINIC | Age: 75
End: 2025-07-22
Payer: MEDICARE

## 2025-07-22 NOTE — TELEPHONE ENCOUNTER
CALLED TO SCHEDULE PT PT WAS CONFUSED STATED SHE WANTED TO BE SCHEDULED IN [place] SPOKE OF APPT SHE HAS ON 07/30 THAT WANTED ME TO CANCEL COMMUNICATED SHE WAS NOT YET SCHEDULED WHEN ASKED IF SHE WOULD LIKE TO SCHEDULE PT VERBALIZES NO OK FOR THE HUB TO RELAY

## 2025-07-22 NOTE — TELEPHONE ENCOUNTER
PT CALLED REQUESTING  A CALL BACK  SHE WOULD LIKE TO SCHEDULE HER SCOPE TODAY  HER CALL BACK [phone]

## 2025-07-22 NOTE — TELEPHONE ENCOUNTER
Hub staff attempted to follow warm transfer process and was unsuccessful     Caller: Colleen Lovell    Relationship to patient: Self    Best call back number:  [phone]    Patient is needing: PT CALLING TO SCHEDULE A COLONOSCOPY, PLEASE REACH BACK OUT TO PT.

## 2025-07-27 DIAGNOSIS — Z86.0101 PERSONAL HISTORY OF ADENOMATOUS AND SERRATED COLON POLYPS: Primary | ICD-10-CM

## 2025-08-07 ENCOUNTER — ANESTHESIA EVENT (OUTPATIENT)
Dept: PERIOP | Facility: HOSPITAL | Age: 75
End: 2025-08-07
Payer: MEDICARE

## 2025-08-08 ENCOUNTER — HOSPITAL ENCOUNTER (OUTPATIENT)
Facility: HOSPITAL | Age: 75
Setting detail: HOSPITAL OUTPATIENT SURGERY
Discharge: HOME | End: 2025-08-08
Attending: INTERNAL MEDICINE | Admitting: INTERNAL MEDICINE
Payer: MEDICARE

## 2025-08-08 ENCOUNTER — ANESTHESIA (OUTPATIENT)
Dept: PERIOP | Facility: HOSPITAL | Age: 75
End: 2025-08-08
Payer: MEDICARE

## 2025-08-08 VITALS
HEART RATE: 67 BPM | OXYGEN SATURATION: 95 % | WEIGHT: 155.8 LBS | TEMPERATURE: 97.6 F | HEIGHT: 63 IN | DIASTOLIC BLOOD PRESSURE: 78 MMHG | SYSTOLIC BLOOD PRESSURE: 130 MMHG | BODY MASS INDEX: 27.61 KG/M2 | RESPIRATION RATE: 14 BRPM

## 2025-08-08 DIAGNOSIS — Z86.0101 PERSONAL HISTORY OF ADENOMATOUS AND SERRATED COLON POLYPS: ICD-10-CM

## 2025-08-08 PROCEDURE — 25810000003 LACTATED RINGERS PER 1000 ML

## 2025-08-08 PROCEDURE — 25010000002 LIDOCAINE 2% SOLUTION: Performed by: NURSE ANESTHETIST, CERTIFIED REGISTERED

## 2025-08-08 PROCEDURE — 88305 TISSUE EXAM BY PATHOLOGIST: CPT | Performed by: INTERNAL MEDICINE

## 2025-08-08 PROCEDURE — 25010000002 PROPOFOL 10 MG/ML EMULSION: Performed by: NURSE ANESTHETIST, CERTIFIED REGISTERED

## 2025-08-08 PROCEDURE — 45380 COLONOSCOPY AND BIOPSY: CPT | Performed by: INTERNAL MEDICINE

## 2025-08-08 RX ORDER — LIDOCAINE HYDROCHLORIDE 10 MG/ML
0.5 INJECTION, SOLUTION EPIDURAL; INFILTRATION; INTRACAUDAL; PERINEURAL ONCE AS NEEDED
Status: DISCONTINUED | OUTPATIENT
Start: 2025-08-08 | End: 2025-08-08 | Stop reason: HOSPADM

## 2025-08-08 RX ORDER — LIDOCAINE HYDROCHLORIDE 20 MG/ML
INJECTION, SOLUTION INFILTRATION; PERINEURAL AS NEEDED
Status: DISCONTINUED | OUTPATIENT
Start: 2025-08-08 | End: 2025-08-08 | Stop reason: SURG

## 2025-08-08 RX ORDER — PROPOFOL 10 MG/ML
VIAL (ML) INTRAVENOUS AS NEEDED
Status: DISCONTINUED | OUTPATIENT
Start: 2025-08-08 | End: 2025-08-08 | Stop reason: SURG

## 2025-08-08 RX ORDER — SODIUM CHLORIDE, SODIUM LACTATE, POTASSIUM CHLORIDE, CALCIUM CHLORIDE 600; 310; 30; 20 MG/100ML; MG/100ML; MG/100ML; MG/100ML
100 INJECTION, SOLUTION INTRAVENOUS ONCE
Status: DISCONTINUED | OUTPATIENT
Start: 2025-08-08 | End: 2025-08-08 | Stop reason: HOSPADM

## 2025-08-08 RX ORDER — SODIUM CHLORIDE 0.9 % (FLUSH) 0.9 %
10 SYRINGE (ML) INJECTION AS NEEDED
Status: DISCONTINUED | OUTPATIENT
Start: 2025-08-08 | End: 2025-08-08 | Stop reason: HOSPADM

## 2025-08-08 RX ORDER — SODIUM CHLORIDE, SODIUM LACTATE, POTASSIUM CHLORIDE, CALCIUM CHLORIDE 600; 310; 30; 20 MG/100ML; MG/100ML; MG/100ML; MG/100ML
9 INJECTION, SOLUTION INTRAVENOUS CONTINUOUS
Status: DISCONTINUED | OUTPATIENT
Start: 2025-08-08 | End: 2025-08-08 | Stop reason: HOSPADM

## 2025-08-08 RX ORDER — SODIUM CHLORIDE 9 MG/ML
40 INJECTION, SOLUTION INTRAVENOUS AS NEEDED
Status: DISCONTINUED | OUTPATIENT
Start: 2025-08-08 | End: 2025-08-08 | Stop reason: HOSPADM

## 2025-08-08 RX ORDER — ONDANSETRON 2 MG/ML
4 INJECTION INTRAMUSCULAR; INTRAVENOUS ONCE AS NEEDED
Status: DISCONTINUED | OUTPATIENT
Start: 2025-08-08 | End: 2025-08-08 | Stop reason: HOSPADM

## 2025-08-08 RX ORDER — SODIUM CHLORIDE 0.9 % (FLUSH) 0.9 %
10 SYRINGE (ML) INJECTION EVERY 12 HOURS SCHEDULED
Status: DISCONTINUED | OUTPATIENT
Start: 2025-08-08 | End: 2025-08-08 | Stop reason: HOSPADM

## 2025-08-08 RX ADMIN — PROPOFOL 20 MG: 10 INJECTION, EMULSION INTRAVENOUS at 11:09

## 2025-08-08 RX ADMIN — PROPOFOL 20 MG: 10 INJECTION, EMULSION INTRAVENOUS at 11:16

## 2025-08-08 RX ADMIN — PROPOFOL 40 MG: 10 INJECTION, EMULSION INTRAVENOUS at 11:11

## 2025-08-08 RX ADMIN — PROPOFOL 80 MG: 10 INJECTION, EMULSION INTRAVENOUS at 11:06

## 2025-08-08 RX ADMIN — PROPOFOL 20 MG: 10 INJECTION, EMULSION INTRAVENOUS at 11:14

## 2025-08-08 RX ADMIN — PROPOFOL 40 MG: 10 INJECTION, EMULSION INTRAVENOUS at 11:07

## 2025-08-08 RX ADMIN — SODIUM CHLORIDE, POTASSIUM CHLORIDE, SODIUM LACTATE AND CALCIUM CHLORIDE 9 ML/HR: 600; 310; 30; 20 INJECTION, SOLUTION INTRAVENOUS at 10:19

## 2025-08-08 RX ADMIN — PROPOFOL 20 MG: 10 INJECTION, EMULSION INTRAVENOUS at 11:18

## 2025-08-08 RX ADMIN — LIDOCAINE HYDROCHLORIDE 50 MG: 20 INJECTION, SOLUTION INFILTRATION; PERINEURAL at 11:06

## 2025-08-08 RX ADMIN — PROPOFOL 20 MG: 10 INJECTION, EMULSION INTRAVENOUS at 11:21

## 2025-08-11 LAB
CYTO UR: NORMAL
LAB AP CASE REPORT: NORMAL
PATH REPORT.FINAL DX SPEC: NORMAL
PATH REPORT.GROSS SPEC: NORMAL

## (undated) DEVICE — BW-412T DISP COMBO CLEANING BRUSH: Brand: SINGLE USE COMBINATION CLEANING BRUSH

## (undated) DEVICE — Device

## (undated) DEVICE — KT ORCA ORCAPOD DISP STRL

## (undated) DEVICE — GLV SURG SENSICARE PI MIC PF SZ8 LF STRL

## (undated) DEVICE — JACKT LAB F/R KNIT CUFF/COLR XLG BLU

## (undated) DEVICE — FRCP BX RADJAW4 NDL 2.8 240CM LG OG BX40

## (undated) DEVICE — LINER SURG CANSTR SXN S/RIGD 1500CC

## (undated) DEVICE — VIAL FORMALIN CAP 10P 40ML

## (undated) DEVICE — ADAPT CLN BIOGUARD AIR/H2O DISP